# Patient Record
Sex: FEMALE | Race: WHITE | Employment: OTHER | URBAN - METROPOLITAN AREA
[De-identification: names, ages, dates, MRNs, and addresses within clinical notes are randomized per-mention and may not be internally consistent; named-entity substitution may affect disease eponyms.]

---

## 2021-11-17 ENCOUNTER — APPOINTMENT (OUTPATIENT)
Dept: GENERAL RADIOLOGY | Age: 86
DRG: 378 | End: 2021-11-17
Attending: EMERGENCY MEDICINE
Payer: MEDICARE

## 2021-11-17 ENCOUNTER — HOSPITAL ENCOUNTER (INPATIENT)
Age: 86
LOS: 9 days | Discharge: HOME HEALTH | DRG: 378 | End: 2021-11-28
Attending: EMERGENCY MEDICINE | Admitting: INTERNAL MEDICINE
Payer: MEDICARE

## 2021-11-17 DIAGNOSIS — K92.2 LOWER GI BLEED: Primary | ICD-10-CM

## 2021-11-17 LAB
ABO + RH BLD: NORMAL
BLOOD GROUP ANTIBODIES SERPL: NORMAL
COVID-19 RAPID TEST, COVR: NOT DETECTED
HCT VFR BLD AUTO: 27.8 % (ref 35.8–46.3)
HCT VFR BLD AUTO: 28.9 % (ref 35.8–46.3)
HGB BLD-MCNC: 9.3 G/DL (ref 11.7–15.4)
HGB BLD-MCNC: 9.7 G/DL (ref 11.7–15.4)
SOURCE, COVRS: NORMAL
SPECIMEN EXP DATE BLD: NORMAL

## 2021-11-17 PROCEDURE — 86900 BLOOD TYPING SEROLOGIC ABO: CPT

## 2021-11-17 PROCEDURE — 74011000250 HC RX REV CODE- 250: Performed by: INTERNAL MEDICINE

## 2021-11-17 PROCEDURE — 74011250637 HC RX REV CODE- 250/637: Performed by: INTERNAL MEDICINE

## 2021-11-17 PROCEDURE — 96374 THER/PROPH/DIAG INJ IV PUSH: CPT

## 2021-11-17 PROCEDURE — 74011250636 HC RX REV CODE- 250/636: Performed by: INTERNAL MEDICINE

## 2021-11-17 PROCEDURE — G0378 HOSPITAL OBSERVATION PER HR: HCPCS

## 2021-11-17 PROCEDURE — 85018 HEMOGLOBIN: CPT

## 2021-11-17 PROCEDURE — 74018 RADEX ABDOMEN 1 VIEW: CPT

## 2021-11-17 PROCEDURE — 87635 SARS-COV-2 COVID-19 AMP PRB: CPT

## 2021-11-17 PROCEDURE — C9113 INJ PANTOPRAZOLE SODIUM, VIA: HCPCS | Performed by: INTERNAL MEDICINE

## 2021-11-17 PROCEDURE — 99284 EMERGENCY DEPT VISIT MOD MDM: CPT

## 2021-11-17 RX ORDER — ESCITALOPRAM OXALATE 20 MG/1
20 TABLET ORAL DAILY
COMMUNITY

## 2021-11-17 RX ORDER — SODIUM CHLORIDE 9 MG/ML
75 INJECTION, SOLUTION INTRAVENOUS CONTINUOUS
Status: DISCONTINUED | OUTPATIENT
Start: 2021-11-17 | End: 2021-11-19

## 2021-11-17 RX ORDER — AMOXICILLIN 250 MG
CAPSULE ORAL
COMMUNITY

## 2021-11-17 RX ORDER — ACETAMINOPHEN 325 MG/1
650 TABLET ORAL
Status: DISCONTINUED | OUTPATIENT
Start: 2021-11-17 | End: 2021-11-17 | Stop reason: SDUPTHER

## 2021-11-17 RX ORDER — ACETAMINOPHEN 650 MG/1
650 SUPPOSITORY RECTAL
Status: DISCONTINUED | OUTPATIENT
Start: 2021-11-17 | End: 2021-11-26 | Stop reason: HOSPADM

## 2021-11-17 RX ORDER — TRAMADOL HYDROCHLORIDE 50 MG/1
TABLET ORAL
COMMUNITY
Start: 2021-08-10

## 2021-11-17 RX ORDER — SODIUM CHLORIDE 0.9 % (FLUSH) 0.9 %
5-40 SYRINGE (ML) INJECTION EVERY 8 HOURS
Status: DISCONTINUED | OUTPATIENT
Start: 2021-11-17 | End: 2021-11-26 | Stop reason: HOSPADM

## 2021-11-17 RX ORDER — PROMETHAZINE HYDROCHLORIDE 25 MG/1
12.5 TABLET ORAL
Status: DISCONTINUED | OUTPATIENT
Start: 2021-11-17 | End: 2021-11-26 | Stop reason: HOSPADM

## 2021-11-17 RX ORDER — GUAIFENESIN 1200 MG
TABLET, EXTENDED RELEASE 12 HR ORAL AS NEEDED
COMMUNITY

## 2021-11-17 RX ORDER — BISACODYL 5 MG
5 TABLET, DELAYED RELEASE (ENTERIC COATED) ORAL DAILY PRN
Status: DISCONTINUED | OUTPATIENT
Start: 2021-11-17 | End: 2021-11-26 | Stop reason: HOSPADM

## 2021-11-17 RX ORDER — ESCITALOPRAM OXALATE 10 MG/1
20 TABLET ORAL DAILY
Status: DISCONTINUED | OUTPATIENT
Start: 2021-11-18 | End: 2021-11-26 | Stop reason: HOSPADM

## 2021-11-17 RX ORDER — ACETAMINOPHEN 325 MG/1
650 TABLET ORAL
Status: DISCONTINUED | OUTPATIENT
Start: 2021-11-17 | End: 2021-11-26 | Stop reason: HOSPADM

## 2021-11-17 RX ORDER — CHOLECALCIFEROL (VITAMIN D3) 125 MCG
CAPSULE ORAL
COMMUNITY

## 2021-11-17 RX ORDER — SODIUM CHLORIDE 0.9 % (FLUSH) 0.9 %
5-40 SYRINGE (ML) INJECTION AS NEEDED
Status: DISCONTINUED | OUTPATIENT
Start: 2021-11-17 | End: 2021-11-26 | Stop reason: HOSPADM

## 2021-11-17 RX ORDER — TRAMADOL HYDROCHLORIDE 50 MG/1
50 TABLET ORAL
Status: DISCONTINUED | OUTPATIENT
Start: 2021-11-17 | End: 2021-11-26 | Stop reason: HOSPADM

## 2021-11-17 RX ORDER — LEVOTHYROXINE SODIUM 75 UG/1
75 TABLET ORAL
Status: DISCONTINUED | OUTPATIENT
Start: 2021-11-18 | End: 2021-11-26 | Stop reason: HOSPADM

## 2021-11-17 RX ORDER — POLYETHYLENE GLYCOL 3350 17 G/17G
17 POWDER, FOR SOLUTION ORAL DAILY
Status: DISCONTINUED | OUTPATIENT
Start: 2021-11-18 | End: 2021-11-18

## 2021-11-17 RX ORDER — AMLODIPINE BESYLATE 5 MG/1
2.5 TABLET ORAL EVERY 12 HOURS
Status: DISCONTINUED | OUTPATIENT
Start: 2021-11-17 | End: 2021-11-26 | Stop reason: HOSPADM

## 2021-11-17 RX ORDER — HYDROCORTISONE ACETATE 25 MG/1
25 SUPPOSITORY RECTAL EVERY 6 HOURS
COMMUNITY

## 2021-11-17 RX ORDER — POLYETHYLENE GLYCOL 3350 17 G/17G
17 POWDER, FOR SOLUTION ORAL DAILY
COMMUNITY

## 2021-11-17 RX ORDER — SODIUM CHLORIDE TAB 1 GM 1 G
TAB MISCELLANEOUS
COMMUNITY

## 2021-11-17 RX ORDER — OMEPRAZOLE 40 MG/1
CAPSULE, DELAYED RELEASE ORAL
COMMUNITY

## 2021-11-17 RX ORDER — ONDANSETRON 2 MG/ML
4 INJECTION INTRAMUSCULAR; INTRAVENOUS
Status: DISCONTINUED | OUTPATIENT
Start: 2021-11-17 | End: 2021-11-26 | Stop reason: HOSPADM

## 2021-11-17 RX ORDER — PROMETHAZINE HYDROCHLORIDE 12.5 MG/1
12.5 TABLET ORAL
COMMUNITY

## 2021-11-17 RX ORDER — DICLOFENAC SODIUM 10 MG/G
2 GEL TOPICAL 4 TIMES DAILY
Status: DISCONTINUED | OUTPATIENT
Start: 2021-11-17 | End: 2021-11-26 | Stop reason: HOSPADM

## 2021-11-17 RX ORDER — ONDANSETRON 4 MG/1
4 TABLET, ORALLY DISINTEGRATING ORAL
Status: DISCONTINUED | OUTPATIENT
Start: 2021-11-17 | End: 2021-11-26 | Stop reason: HOSPADM

## 2021-11-17 RX ORDER — LEVOTHYROXINE SODIUM 50 UG/1
75 TABLET ORAL
COMMUNITY

## 2021-11-17 RX ORDER — LORAZEPAM 0.5 MG/1
0.25 TABLET ORAL 2 TIMES DAILY
Status: DISCONTINUED | OUTPATIENT
Start: 2021-11-17 | End: 2021-11-26 | Stop reason: HOSPADM

## 2021-11-17 RX ORDER — DICLOFENAC SODIUM 10 MG/G
GEL TOPICAL 4 TIMES DAILY
COMMUNITY

## 2021-11-17 RX ORDER — ACETAMINOPHEN 650 MG/1
650 SUPPOSITORY RECTAL
Status: DISCONTINUED | OUTPATIENT
Start: 2021-11-17 | End: 2021-11-17 | Stop reason: SDUPTHER

## 2021-11-17 RX ORDER — AMLODIPINE BESYLATE 2.5 MG/1
5 TABLET ORAL EVERY 12 HOURS
Status: ON HOLD | COMMUNITY
End: 2021-11-28 | Stop reason: SDUPTHER

## 2021-11-17 RX ORDER — LORAZEPAM 0.5 MG/1
TABLET ORAL
COMMUNITY
Start: 2021-08-10

## 2021-11-17 RX ADMIN — Medication 10 ML: at 22:01

## 2021-11-17 RX ADMIN — DICLOFENAC SODIUM 2 G: 10 GEL TOPICAL at 23:30

## 2021-11-17 RX ADMIN — AMLODIPINE BESYLATE 2.5 MG: 5 TABLET ORAL at 23:24

## 2021-11-17 RX ADMIN — SODIUM CHLORIDE 75 ML/HR: 900 INJECTION, SOLUTION INTRAVENOUS at 21:19

## 2021-11-17 RX ADMIN — SODIUM CHLORIDE 40 MG: 9 INJECTION INTRAMUSCULAR; INTRAVENOUS; SUBCUTANEOUS at 22:01

## 2021-11-17 RX ADMIN — TRAMADOL HYDROCHLORIDE 50 MG: 50 TABLET, COATED ORAL at 23:24

## 2021-11-17 RX ADMIN — LORAZEPAM 0.25 MG: 0.5 TABLET ORAL at 23:24

## 2021-11-17 NOTE — H&P
Hospitalist History and Physical   Admit Date:  2021  2:04 PM   Name:  Cally Guzman   Age:  80 y.o. Sex:  female  :  1926   MRN:  [id number]   Room:  [place] [address]    Presenting Complaint: Constipation    Reason(s) for Admission: Lower GI bleed [K92.2]     History of Present Illness:   Cally Guzman is a 80 y.o. female with medical history of hemorrhoids, coronary artery disease, hip fracture, osteoporosis, chronic hyponatremia, chronic cystitis, hypertension who presents to the emergency department after being evaluated at [facility] emergency [place] for the patient was complaining of constant need for having to have a bowel movement and when she does this she has no evacuation of feces and has bright red blood. Apparently at [facility] emergency department emergency room physician attempted to have the patient passed a bowel movement indeed did not have any fecal episode but did have a large bright red blood per rectum episode. Subsequently the patient was sent to our emergency department for further evaluation and treatment. At the time of my evaluation of the patient patient has become significantly agitated while she is oriented to person place and time as well as being able to tell me that she has been constipated for a week but has only been able to pass bright red blood she cannot comprehend why she has to be admitted to the hospital and she further is tearful and a poor historian and she is very agitated. Much of the information obtained in my history and physical was obtained from prior medical records and outside medical records. Course of action in the emergency department-KUB was obtained that demonstrated no acute process. H&H was obtained that demonstrated hemoglobin 9.6 hematocrit of 28.9.. BMP demonstrated a serum sodium of 134 potassium 3.8 chloride of 96 CO2 of 28 anion gap of 10 BUN of 17 creatinine of 0.7.   Outside medical records were reviewed and patient has a baseline hemoglobin approximately 11. Secondary to 3 g drop in patient's hemoglobin hematocrit the decision was made to admit the patient for further evaluation and treatment. Review of Systems:  10 systems reviewed and negative except as noted in HPI. Assessment & Plan:     Lower GI bleed-likely hemorrhoidal in nature given bright red blood per rectum and patient's constant straining to have a bowel movement. Will make the patient n.p.o. initiate IV fluids start Protonix and monitor patient's hemoglobin hematocrit. Will consult GI in a.m. Chronic hyponatremia-noted and patient appears to be at baseline      Hypertension-we will start patient on home meds. Currently to goal      Anxiety/depression-we will resume patient's home meds        Coronary artery disease-no acute process at this time we will resume patient's home meds. Will hold aspirin and/or Plavix.         Dispo/Discharge Planning:   Home with home health    Diet: DIET NPO  VTE ppx: SCD  Code status: Full Code    Hospital Problems as of 11/17/2021 Never Reviewed          Codes Class Noted - Resolved POA    Lower GI bleed ICD-10-CM: K92.2  ICD-9-CM: 578.9  11/17/2021 - Present Unknown                Past Medical History:    Diagnosis Date    Anxiety    CAD (coronary artery disease)   [facility] (8/2010) mild CAD, EF 60%, nonobstructive    Cancer ([facility] [address]   History of basal cell carcinoma    Depression    H/O bone density study   BMD (7/2009) T score lumbar spine -0.4    H/O echocardiogram 2/2015   EF 65%, dilated LA, trace TR    History of Holter monitoring   Holter monitor (3/2015) NSR, no pauses of AV block, normal intervals, isolated nonsustained episodes of SVT    Hypertension    Hyponatremia    Hypothyroidism    Interstitial cystitis    Lipid screening   FLP (9/2012) total 178, TG 92, HDL 67, LDL 86    Osteoporosis    Stasis dermatitis       Allergies   Allergen Reactions    Aleve [Naproxen Sodium] Other- (not listed) - Allergy dizziness    Ciprofloxacin Nausea and/or vomiting-Intolerance   GI upset    Sulfa (Sulfonamide Antibiotics) Nausea and/or vomiting-Intolerance   GI upset       Past Surgical History:   Procedure Laterality Date    HEMORRHOID SURGERY 1955    HIP SURGERY Left 01/14/2019   Left hip hemiarthroplasty    HYSTERECTOMY   1955    INTERTROCHANTERIC HIP FRACTURE SURGERY Right 2/5/2017   CM nail, [facility] MOHS SURGERY 2010       Family History   Problem Relation Age of Onset    Leukemia Mother    Anxiety disorder Sister    Diabetes Sister    Bipolar disorder Daughter       Social History     Tobacco Use    Smoking status: Not on file    Smokeless tobacco: Not on file   Substance Use Topics    Alcohol use: Not on file      No family history on file. Family history reviewed and negative except as otherwise noted. There is no immunization history on file for this patient. Prior to Admit Medications:  No current outpatient medications    Objective:     Patient Vitals for the past 24 hrs:   Temp Pulse Resp BP SpO2   11/17/21 1411 98.4 °F (36.9 °C) 66 20 114/66 98 %     Oxygen Therapy  O2 Sat (%): 98 % (11/17/21 1411)    There is no height or weight on file to calculate BMI. No intake or output data in the 24 hours ending 11/17/21 1809      Physical Exam:    Blood pressure 114/66, pulse 66, temperature 98.4 °F (36.9 °C), resp. rate 20, SpO2 98 %. General:    Well nourished. No overt distress  Head:  Normocephalic, atraumatic  Eyes:  Sclerae appear normal.  Pupils equally round. ENT:  Nares appear normal, no drainage. Moist oral mucosa  Neck:  No restricted ROM. Trachea midline   CV:   RRR. No m/r/g. No jugular venous distension. Lungs:   CTAB. No wheezing, rhonchi, or rales. Respirations even, unlabored  Abdomen: Bowel sounds present. Soft, nontender, nondistended. Extremities: No cyanosis or clubbing. No edema  Skin:     No rashes and normal coloration. Warm and dry.     Neuro:  CN II-XII grossly intact. Sensation intact. A&Ox3 clearly agitated and confused as to why she has to be admitted to the hospital.  Psych:  Normal mood and affect. I have reviewed ordered lab tests and independently visualized imaging below:    Last 24hr Labs:  Recent Results (from the past 24 hour(s))   TYPE & SCREEN    Collection Time: 11/17/21  2:59 PM   Result Value Ref Range    Crossmatch Expiration 11/20/2021,2359     ABO/Rh(D) A POSITIVE     Antibody screen NEG    HGB & HCT    Collection Time: 11/17/21  2:59 PM   Result Value Ref Range    HGB 9.7 (L) 11.7 - 15.4 g/dL    HCT 28.9 (L) 35.8 - 46.3 %       All Micro Results     None          Other Studies:  XR ABD (KUB)    Result Date: 11/17/2021  KUB CLINICAL INDICATION: Constipation for one week with blood in stools FINDINGS: Two supine views of the abdomen and pelvis submitted. Air and stool noted throughout the colon which is nondilated. There is stool over the rectum. No dilated loops of bowel present to indicate obstruction. Degenerative spondylosis is noted of the lumbar spine. No abnormal calculi present. The patient has bilateral hip hardware. No acute abnormality. Constipation could be considered. Signed:  Elsie Kim DO    Part of this note may have been written by using a voice dictation software. The note has been proof read but may still contain some grammatical/other typographical errors.

## 2021-11-17 NOTE — ED PROVIDER NOTES
Patient was referred to the emergency room for further evaluation for possible GI bleeding. The patient was seen in local freestanding emergency care center with complaints of constipation and rectal bleeding. The patient states she is been constipated for over a month and has a constant urge of needing to defecate. She states that when she goes to the bathroom only blood comes out. Report from the provider that saw the patient at the urgent care center states that she went to the bathroom and was found to have a large amount of bright red blood in the toilet but no stool after going. It was unclear whether the bleeding was rectal or vaginal as there was no significant blood noted on rectal exam however stool was Hemoccult positive. Patient is status post hysterectomy. She denies any abdominal pain or lightheadedness and review of systems otherwise negative. The history is provided by the patient and medical records. Constipation   This is a chronic problem. The current episode started more than 1 week ago. The stool is described as bloody coated. Associated symptoms include constipation. Pertinent negatives include no abdominal pain, no flatus, no dysuria, no abdominal distention, no chills, no fever, no nausea, no back pain, no vomiting and no diarrhea. Risk factors include a recent illness. She has tried nothing for the symptoms. The treatment provided no relief. Her past medical history does not include dementia, neuromuscular disease, irritable bowel syndrome, neurologic disease, small bowel obstruction, abdominal surgery, nursing home resident, endocrine disease or metabolic disease. No past medical history on file. No past surgical history on file. No family history on file.     Social History     Socioeconomic History    Marital status:      Spouse name: Not on file    Number of children: Not on file    Years of education: Not on file    Highest education level: Not on file   Occupational History    Not on file   Tobacco Use    Smoking status: Not on file    Smokeless tobacco: Not on file   Substance and Sexual Activity    Alcohol use: Not on file    Drug use: Not on file    Sexual activity: Not on file   Other Topics Concern    Not on file   Social History Narrative    Not on file     Social Determinants of Health     Financial Resource Strain:     Difficulty of Paying Living Expenses: Not on file   Food Insecurity:     Worried About Running Out of Food in the Last Year: Not on file    Norah of Food in the Last Year: Not on file   Transportation Needs:     Lack of Transportation (Medical): Not on file    Lack of Transportation (Non-Medical): Not on file   Physical Activity:     Days of Exercise per Week: Not on file    Minutes of Exercise per Session: Not on file   Stress:     Feeling of Stress : Not on file   Social Connections:     Frequency of Communication with Friends and Family: Not on file    Frequency of Social Gatherings with Friends and Family: Not on file    Attends [employer]ism Services: Not on file    Active Member of [address] [place] [address] or Organizations: Not on file    Attends Club or Organization Meetings: Not on file    Marital Status: Not on file   Intimate Partner Violence:     Fear of Current or Ex-Partner: Not on file    Emotionally Abused: Not on file    Physically Abused: Not on file    Sexually Abused: Not on file   Housing Stability:     Unable to Pay for Housing in the Last Year: Not on file    Number of [place] in the Last Year: Not on file    Unstable Housing in the Last Year: Not on file         ALLERGIES: Patient has no allergy information on record. Review of Systems   Constitutional: Negative for chills and fever. Gastrointestinal: Positive for constipation. Negative for abdominal distention, abdominal pain, diarrhea, flatus, nausea and vomiting. Genitourinary: Negative for dysuria.    Musculoskeletal: Negative for back pain.   All other systems reviewed and are negative. Vitals:    11/17/21 1411   BP: 114/66   Pulse: 66   Resp: 20   Temp: 98.4 °F (36.9 °C)   SpO2: 98%            Physical Exam  Vitals and nursing note reviewed. Constitutional:       General: She is not in acute distress. Appearance: She is not ill-appearing, toxic-appearing or diaphoretic. Comments: Frail-appearing 27-year-old woman   HENT:      Head: Normocephalic and atraumatic. Nose: Nose normal.      Mouth/Throat:      Mouth: Mucous membranes are moist.      Pharynx: Oropharynx is clear. Eyes:      Extraocular Movements: Extraocular movements intact. Conjunctiva/sclera: Conjunctivae normal.      Pupils: Pupils are equal, round, and reactive to light. Cardiovascular:      Rate and Rhythm: Normal rate and regular rhythm. Pulses: Normal pulses. Pulmonary:      Effort: Pulmonary effort is normal.      Breath sounds: Normal breath sounds. Abdominal:      General: Bowel sounds are normal. There is no distension. Palpations: Abdomen is soft. Tenderness: There is no abdominal tenderness. There is no right CVA tenderness, left CVA tenderness or guarding. Genitourinary:     Comments: Pressure bright red blood is noted to be in depends as well as a pad and covering the perineum. Pediatric speculum was used for vaginal exam no evidence of bleeding from within the vaginal vault. Rectal exam demonstrates no anal fissures there is brown soft stool in the rectal vault. Small amount of blood noted on the glove. No palpable hemorrhoids or masses were noted. Musculoskeletal:         General: Normal range of motion. Cervical back: Normal range of motion and neck supple. Skin:     General: Skin is warm and dry. Capillary Refill: Capillary refill takes less than 2 seconds. Neurological:      General: No focal deficit present. Mental Status: She is alert and oriented to person, place, and time.  Mental status is at baseline. Psychiatric:         Mood and Affect: Mood normal.         Behavior: Behavior normal.         Thought Content: Thought content normal.          MDM  Number of Diagnoses or Management Options  Diagnosis management comments: History is results from emergency MD: WBC = 4.1; Hbg = 9.8;  Hct = 27; Plts = 300; no significant abnormalities in the chemistries; BUN=18, Cr = 0.9       Amount and/or Complexity of Data Reviewed  Clinical lab tests: ordered and reviewed  Review and summarize past medical records: yes  Independent visualization of images, tracings, or specimens: yes    Risk of Complications, Morbidity, and/or Mortality  Presenting problems: moderate  Diagnostic procedures: moderate  Management options: moderate    Patient Progress  Patient progress: stable         Procedures

## 2021-11-17 NOTE — ED TRIAGE NOTES
Pt here from emergency MD by EMS, pt comes to ED with complaint of constipation X 1 week. EMS states the provider at emergency [facility] states that the pt had a positive hemoccult and a large amount of blood in the toilet after using the restroom.

## 2021-11-18 PROBLEM — E03.9 ACQUIRED HYPOTHYROIDISM: Status: ACTIVE | Noted: 2021-11-18

## 2021-11-18 PROBLEM — D62 ACUTE BLOOD LOSS ANEMIA: Status: ACTIVE | Noted: 2021-11-18

## 2021-11-18 PROBLEM — I10 HYPERTENSION: Status: ACTIVE | Noted: 2021-11-18

## 2021-11-18 PROBLEM — F32.9 MDD (MAJOR DEPRESSIVE DISORDER): Status: ACTIVE | Noted: 2021-11-18

## 2021-11-18 PROBLEM — F41.9 ANXIETY: Status: ACTIVE | Noted: 2021-11-18

## 2021-11-18 LAB
ANION GAP SERPL CALC-SCNC: 5 MMOL/L (ref 7–16)
BASOPHILS # BLD: 0 K/UL (ref 0–0.2)
BASOPHILS NFR BLD: 1 % (ref 0–2)
BUN SERPL-MCNC: 18 MG/DL (ref 8–23)
CALCIUM SERPL-MCNC: 8.8 MG/DL (ref 8.3–10.4)
CHLORIDE SERPL-SCNC: 101 MMOL/L (ref 98–107)
CO2 SERPL-SCNC: 26 MMOL/L (ref 21–32)
CREAT SERPL-MCNC: 0.66 MG/DL (ref 0.6–1)
DIFFERENTIAL METHOD BLD: ABNORMAL
EOSINOPHIL # BLD: 0.1 K/UL (ref 0–0.8)
EOSINOPHIL NFR BLD: 1 % (ref 0.5–7.8)
ERYTHROCYTE [DISTWIDTH] IN BLOOD BY AUTOMATED COUNT: 14.9 % (ref 11.9–14.6)
GLUCOSE SERPL-MCNC: 81 MG/DL (ref 65–100)
HCT VFR BLD AUTO: 26.2 % (ref 35.8–46.3)
HCT VFR BLD AUTO: 27 % (ref 35.8–46.3)
HGB BLD-MCNC: 8.8 G/DL (ref 11.7–15.4)
HGB BLD-MCNC: 8.9 G/DL (ref 11.7–15.4)
IMM GRANULOCYTES # BLD AUTO: 0 K/UL (ref 0–0.5)
IMM GRANULOCYTES NFR BLD AUTO: 1 % (ref 0–5)
LYMPHOCYTES # BLD: 1.1 K/UL (ref 0.5–4.6)
LYMPHOCYTES NFR BLD: 26 % (ref 13–44)
MCH RBC QN AUTO: 32.4 PG (ref 26.1–32.9)
MCHC RBC AUTO-ENTMCNC: 33 G/DL (ref 31.4–35)
MCV RBC AUTO: 98.2 FL (ref 79.6–97.8)
MONOCYTES # BLD: 0.5 K/UL (ref 0.1–1.3)
MONOCYTES NFR BLD: 12 % (ref 4–12)
NEUTS SEG # BLD: 2.6 K/UL (ref 1.7–8.2)
NEUTS SEG NFR BLD: 59 % (ref 43–78)
NRBC # BLD: 0 K/UL (ref 0–0.2)
PLATELET # BLD AUTO: 271 K/UL (ref 150–450)
PMV BLD AUTO: 8.9 FL (ref 9.4–12.3)
POTASSIUM SERPL-SCNC: 4 MMOL/L (ref 3.5–5.1)
RBC # BLD AUTO: 2.75 M/UL (ref 4.05–5.2)
SODIUM SERPL-SCNC: 132 MMOL/L (ref 136–145)
WBC # BLD AUTO: 4.3 K/UL (ref 4.3–11.1)

## 2021-11-18 PROCEDURE — 85018 HEMOGLOBIN: CPT

## 2021-11-18 PROCEDURE — C9113 INJ PANTOPRAZOLE SODIUM, VIA: HCPCS | Performed by: INTERNAL MEDICINE

## 2021-11-18 PROCEDURE — G0378 HOSPITAL OBSERVATION PER HR: HCPCS

## 2021-11-18 PROCEDURE — 80048 BASIC METABOLIC PNL TOTAL CA: CPT

## 2021-11-18 PROCEDURE — 74011250636 HC RX REV CODE- 250/636: Performed by: INTERNAL MEDICINE

## 2021-11-18 PROCEDURE — 74011250637 HC RX REV CODE- 250/637: Performed by: INTERNAL MEDICINE

## 2021-11-18 PROCEDURE — 85025 COMPLETE CBC W/AUTO DIFF WBC: CPT

## 2021-11-18 PROCEDURE — 36415 COLL VENOUS BLD VENIPUNCTURE: CPT

## 2021-11-18 PROCEDURE — 74011000250 HC RX REV CODE- 250: Performed by: INTERNAL MEDICINE

## 2021-11-18 PROCEDURE — 96376 TX/PRO/DX INJ SAME DRUG ADON: CPT

## 2021-11-18 RX ORDER — HYDROCORTISONE ACETATE 25 MG/1
25 SUPPOSITORY RECTAL EVERY 12 HOURS
Status: DISCONTINUED | OUTPATIENT
Start: 2021-11-18 | End: 2021-11-26 | Stop reason: HOSPADM

## 2021-11-18 RX ORDER — FACIAL-BODY WIPES
10 EACH TOPICAL DAILY PRN
Status: DISCONTINUED | OUTPATIENT
Start: 2021-11-18 | End: 2021-11-26 | Stop reason: HOSPADM

## 2021-11-18 RX ORDER — POLYETHYLENE GLYCOL 3350 17 G/17G
17 POWDER, FOR SOLUTION ORAL 2 TIMES DAILY
Status: DISCONTINUED | OUTPATIENT
Start: 2021-11-19 | End: 2021-11-26 | Stop reason: HOSPADM

## 2021-11-18 RX ADMIN — Medication 10 ML: at 13:53

## 2021-11-18 RX ADMIN — DICLOFENAC SODIUM 2 G: 10 GEL TOPICAL at 13:52

## 2021-11-18 RX ADMIN — TRAMADOL HYDROCHLORIDE 50 MG: 50 TABLET, COATED ORAL at 08:14

## 2021-11-18 RX ADMIN — Medication 10 ML: at 22:45

## 2021-11-18 RX ADMIN — HYDROCORTISONE ACETATE 25 MG: 25 SUPPOSITORY RECTAL at 22:30

## 2021-11-18 RX ADMIN — LORAZEPAM 0.25 MG: 0.5 TABLET ORAL at 17:30

## 2021-11-18 RX ADMIN — SODIUM CHLORIDE 40 MG: 9 INJECTION INTRAMUSCULAR; INTRAVENOUS; SUBCUTANEOUS at 22:22

## 2021-11-18 RX ADMIN — Medication 1 EACH: at 16:09

## 2021-11-18 RX ADMIN — TRAMADOL HYDROCHLORIDE 50 MG: 50 TABLET, COATED ORAL at 19:51

## 2021-11-18 RX ADMIN — DICLOFENAC SODIUM 2 G: 10 GEL TOPICAL at 09:41

## 2021-11-18 RX ADMIN — Medication 10 ML: at 05:00

## 2021-11-18 RX ADMIN — LEVOTHYROXINE SODIUM 75 MCG: 75 TABLET ORAL at 05:40

## 2021-11-18 RX ADMIN — ESCITALOPRAM OXALATE 20 MG: 10 TABLET ORAL at 09:33

## 2021-11-18 RX ADMIN — BISACODYL 10 MG: 10 SUPPOSITORY RECTAL at 09:40

## 2021-11-18 RX ADMIN — SODIUM CHLORIDE 75 ML/HR: 900 INJECTION, SOLUTION INTRAVENOUS at 22:29

## 2021-11-18 RX ADMIN — DICLOFENAC SODIUM 2 G: 10 GEL TOPICAL at 22:36

## 2021-11-18 RX ADMIN — SODIUM CHLORIDE 75 ML/HR: 900 INJECTION, SOLUTION INTRAVENOUS at 11:12

## 2021-11-18 RX ADMIN — AMLODIPINE BESYLATE 2.5 MG: 5 TABLET ORAL at 22:20

## 2021-11-18 RX ADMIN — SODIUM CHLORIDE 40 MG: 9 INJECTION INTRAMUSCULAR; INTRAVENOUS; SUBCUTANEOUS at 09:35

## 2021-11-18 RX ADMIN — LORAZEPAM 0.25 MG: 0.5 TABLET ORAL at 09:33

## 2021-11-18 RX ADMIN — DICLOFENAC SODIUM 2 G: 10 GEL TOPICAL at 17:36

## 2021-11-18 RX ADMIN — AMLODIPINE BESYLATE 2.5 MG: 5 TABLET ORAL at 09:34

## 2021-11-18 NOTE — PROGRESS NOTES
Care Management Interventions  PCP Verified by CM: No  Mode of Transport at Discharge: BLS  Transition of Care Consult (CM Consult): [address]: No  Reason Outside [place]: Patient already serviced by other home care/hospice agency  Physical Therapy Consult: Yes  Occupational Therapy Consult: Yes  Support Systems: Other Family Member(s)  Confirm Follow Up Transport: Family  The Patient and/or Patient Representative was Provided with a Choice of Provider and Agrees with the Discharge Plan?: Yes  Freedom of Choice List was Provided with Basic Dialogue that Supports the Patient's Individualized Plan of Care/Goals, Treatment Preferences and Shares the Quality Data Associated with the Providers?: Yes  Discharge Location  Discharge Placement: Home with home health     Patient in room alone. Lying in bed. States she lives with her grandson and six great-grandchildren. Has a home, she says, but has had to move in with grandson to have more help with her care. She wants to go home so bad to her own home. Advised that [facility] can resume her care at her grandson's home. She didn't respond to this. Have talked with Alan Phipps from [facility] on floor. States when she is ready for DC and medically stable,  they can do a resumption of care for her.

## 2021-11-18 NOTE — PROGRESS NOTES
Problem: Pressure Injury - Risk of  Goal: *Prevention of pressure injury  Description: Document Ralph Scale and appropriate interventions in the flowsheet.   Outcome: Progressing Towards Goal  Note: Pressure Injury Interventions:  Sensory Interventions: Assess changes in LOC, Minimize linen layers    Moisture Interventions: Absorbent underpads, Maintain skin hydration (lotion/cream), Minimize layers    Activity Interventions: Pressure redistribution bed/mattress(bed type)    Mobility Interventions: Pressure redistribution bed/mattress (bed type)    Nutrition Interventions: Discuss nutritional consult with provider    Friction and Shear Interventions: Foam dressings/transparent film/skin sealants                Problem: Patient Education: Go to Patient Education Activity  Goal: Patient/Family Education  Outcome: Progressing Towards Goal

## 2021-11-18 NOTE — CONSULTS
[facility] Consult Note       Primary GI Physician: new    Referring Provider:  Dr Yony Jesus Date:  11/18/2021    Admit Date:  11/17/2021    Chief Complaint:  Rectal bleeding, constipation      Subjective:     History of Present Illness:  Patient is a 80 y.o. female with PMH CAD, osteoporosis, HTN, chronic hyponatremia, chronic anxiety, hemorrhoids, seen in consultation at the request of Dr. Meg Fregoso for evaluation of constipation and rectal bleeding. She was initially seen at urgent care center yesterday for the same, sent to the ER for further evaluation after noted to have large episode of rectal bleeding. Hgb on arrival here was 9.7, down to 8.9 this morning. Review of records in care everywhere noted hgb 11.7 in August, with hgb 10.5 on 11/12, and down to 9.8 on 11/15. She has been on Pericolace and Miralax as an outpatient (per internist records) for treatment of constipation. PMH:  - CAD, Depresssion, HTN, Hypothyroidism, Interstitial Cystitis, Osteoporosis    PSH:  - Hemorrhoid surgery in 1955, L Hip Surgery in 2019, Hysterectomy in 1955, Hip Fracture in 2017, MOHS in 2010    Allergies:  - Aleve, Cipro and Sulfa    Home Medications:  Prior to Admission medications    Medication Sig Start Date End Date Taking? Authorizing Provider   amLODIPine (NORVASC) 2.5 mg tablet 5 mg every twelve (12) hours. Yes Other, MD Kelle   levothyroxine (SYNTHROID) 50 mcg tablet 75 mcg. Yes Other, MD Kelle   LORazepam (ATIVAN) 0.5 mg tablet lorazepam 0.5 mg tablet   TAKE 1/2 TABLET EVERY MOTNING AND 1/2 TABLET AT BEDTIME 8/10/21  Yes Other, MD Kelle   omeprazole (PRILOSEC) 40 mg capsule omeprazole 40 mg capsule,delayed release   TAKE 1 CAPSULE EACH DAY FOR STOMACH ACID REDUCTION.    Yes Other, MD Kelle   senna-docusate (PERICOLACE) 8.6-50 mg per tablet Senna Plus 8.6 mg-50 mg tablet   TAKE 1-2 TABLETS EVERY DAY AS NEEDED FOR CONSTIPATION   Yes Other, MD Kelle   sodium chloride 1 gram tablet sodium chloride 1 gram tablet   TAKE 1 TABLET TWICE A DAY FOR SODIUM LEVELS. Yes Claudio, MD Kelle   traMADoL (ULTRAM) 50 mg tablet tramadol 50 mg tablet   Take 1 tablet every 6 hours by oral route as needed for 30 days. 8/10/21  Yes Kelle Snyder MD   hydrocortisone (Anusol-HC) 25 mg supp Insert 25 mg into rectum every six (6) hours. Yes Claudio, MD Kelle   cholecalciferol, vitamin D3, (Vitamin D3) 50 mcg (2,000 unit) tab Take  by mouth. Yes Claudio, MD Kelle   escitalopram oxalate (Lexapro) 20 mg tablet Take 20 mg by mouth daily. Yes Claudio, MD Kelle   polyethylene glycol (Miralax) 17 gram/dose powder Take 17 g by mouth daily. Yes Claudio, MD Kelle   zwspugshg-Okuunkqk-Fxozo-W.Pet (Preparation H Maximum Strength) 0.25-1 % rectal cream Apply  to affected area as needed for Hemorrhoids. Yes Claudio, MD Kelle   diclofenac (Voltaren) 1 % gel Apply  to affected area four (4) times daily. Yes Claudio, MD Kelle   promethazine (PHENERGAN) 12.5 mg tablet Take 12.5 mg by mouth nightly as needed for Nausea. Other, MD Kelle   acetaminophen (TylenoL) 325 mg cap Take  by mouth as needed.     Other, MD Kelle       Hospital Medications:  Current Facility-Administered Medications   Medication Dose Route Frequency    bisacodyL (DULCOLAX) suppository 10 mg  10 mg Rectal DAILY PRN    sodium chloride (NS) flush 5-40 mL  5-40 mL IntraVENous Q8H    sodium chloride (NS) flush 5-40 mL  5-40 mL IntraVENous PRN    ondansetron (ZOFRAN ODT) tablet 4 mg  4 mg Oral Q8H PRN    Or    ondansetron (ZOFRAN) injection 4 mg  4 mg IntraVENous Q6H PRN    polyethylene glycol (MIRALAX) packet 17 g  17 g Oral DAILY    bisacodyL (DULCOLAX) tablet 5 mg  5 mg Oral DAILY PRN    pantoprazole (PROTONIX) 40 mg in 0.9% sodium chloride 10 mL injection  40 mg IntraVENous Q12H    0.9% sodium chloride infusion  75 mL/hr IntraVENous CONTINUOUS    acetaminophen (TYLENOL) tablet 650 mg  650 mg Oral Q6H PRN    Or    acetaminophen (TYLENOL) suppository 650 mg 650 mg Rectal Q6H PRN    amLODIPine (NORVASC) tablet 2.5 mg  2.5 mg Oral Q12H    escitalopram oxalate (LEXAPRO) tablet 20 mg  20 mg Oral DAILY    diclofenac (VOLTAREN) 1 % topical gel 2 g  2 g Topical QID    levothyroxine (SYNTHROID) tablet 75 mcg  75 mcg Oral ACB    LORazepam (ATIVAN) tablet 0.25 mg  0.25 mg Oral BID    promethazine (PHENERGAN) tablet 12.5 mg  12.5 mg Oral QHS PRN    traMADoL (ULTRAM) tablet 50 mg  50 mg Oral Q6H PRN       Social History:  Social History     Tobacco Use    Smoking status: Not on file    Smokeless tobacco: Not on file   Substance Use Topics    Alcohol use: Not on file       Pt denies any history of drug use, blood transfusions, or tattoos. Family History:  - FMHx of Leukemia in Mother, No FMHx of colon cancer. Review of Systems:  A detailed 10 system ROS is obtained, with pertinent positives as listed above. All others are negative. Objective:     Physical Exam:  Vitals:  Visit Vitals  /70   Pulse 68   Temp (!) 96.7 °F (35.9 °C)   Resp 10   SpO2 98%     Gen:  Pt is alert, cooperative, no acute distress. Sitting up in bed. Elderly. Skin:  Extremities and face reveal no rashes. HEENT: Sclerae anicteric. Extra-occular muscles are intact. No oral ulcers. No abnormal pigmentation of the lips. The neck is supple. Cardiovascular: Regular rate and rhythm. No murmurs, gallops, or rubs. Respiratory:  Comfortable breathing with no accessory muscle use. Clear breath sounds anteriorly with no wheezes, rales, or rhonchi. GI:  Abdomen nondistended, soft, and nontender. Normal active bowel sounds. No enlargement of the liver or spleen. No masses palpable. Rectal:  Deferred by patient  Musculoskeletal:  No pitting edema of the lower legs. Arthritis changes noted in hands/ fingers. Neurological:  Gross memory appears intact. Patient is alert and oriented. Psychiatric:  Mood appears appropriate with judgement intact.   Lymphatic:  No cervical or supraclavicular adenopathy. Laboratory:    Recent Labs     11/18/21  0501 11/17/21 2052 11/17/21  1459   WBC 4.3  --   --    HGB 8.9* 9.3* 9.7*   HCT 27.0* 27.8* 28.9*     --   --    MCV 98.2*  --   --    *  --   --    K 4.0  --   --      --   --    CO2 26  --   --    BUN 18  --   --    CREA 0.66  --   --    CA 8.8  --   --    GLU 81  --   --           Assessment:     Principal Problem:    Acute blood loss anemia (11/18/2021)    Active Problems:    Lower GI bleed (11/17/2021)      MDD (major depressive disorder) (11/18/2021)      Anxiety (11/18/2021)      Hypertension (11/18/2021)      Acquired hypothyroidism (11/18/2021)        Plan:     81 yo female admitted by the hospitalist service for rectal bleeding with drop in hgb. She has had a 1 gm drop in hgb overnight and admits to chronic constipation at home. Etiology of bleeding likely hemorrhoidal vs. Fissure vs. Less likely malignancy. [place] with clear liquids  2. Follow hgb  3. Need aggressive treatment for constipation with daily stool softeners and bid Miralax but can be titrated up to four doses daily to make sure patient is having a soft and formed BM daily. 4. Will trial Anusol suppositories  5. I did discuss option of colonoscopy vs. flexible sigmoidoscopy with the patient and her grandon over the phone. Defer endoscopy at this time as patient at high risk for complications given advanced age. Grandson did not think patient would be able to complete a bowel prep and did not want to pursue aggressive measures right away. Patient was also agreeable.     Janie Claire MD   [facility]

## 2021-11-18 NOTE — PROGRESS NOTES
END OF SHIFT NOTE:    Intake/Output  No intake/output data recorded. Voiding: YES  Catheter: NO      Stool:  0 occurrences. Emesis:  0 occurrences. VITAL SIGNS  Patient Vitals for the past 12 hrs:   Temp Pulse Resp BP SpO2   11/17/21 2249 98 °F (36.7 °C) 70 25 120/63 94 %   11/17/21 2044 98 °F (36.7 °C) 65 27 110/64 97 %   11/17/21 2000   16 127/68 95 %   11/17/21 1931    125/69        Pain Assessment  Pain 1  Pain Scale 1: Numeric (0 - 10) (11/17/21 2158)  Pain Intensity 1: 0 (11/17/21 2158)  Patient Stated Pain Goal: 0 (11/17/21 2158)    Ambulating  No    Additional Information:   - pt resting, no needs voiced at this time    Shift report given to oncoming nurse at the bedside.     Kendall Castellano RN

## 2021-11-18 NOTE — ED NOTES
TRANSFER - OUT REPORT:    Verbal report given to [facility]. RN on [facility]  being transferred to room 356 for routine progression of care       Report consisted of patients Situation, Background, Assessment and   Recommendations(SBAR). Information from the following report(s) SBAR was reviewed with the receiving nurse. Lines:       Opportunity for questions and clarification was provided.       Patient transported with:   Trist

## 2021-11-18 NOTE — PROGRESS NOTES
Hospitalist Progress Note   Admit Date:  2021  2:04 PM   Name:  Kandice Ortega   Age:  80 y.o. Sex:  female  :  1926   MRN:  [id number]   Room:  [facility]/    Presenting Complaint: Constipation    Reason(s) for Admission: Lower GI bleed [facility] Course & Interval History:   Mrs. Anny Elizondo is a very nice 81 y/o WF with a h/o CAD, HTN, chronic hyponatremia who was admitted to our service on  with acute blood loss anemia and hematochezia. Hb 1 week prior to admission was in the 10s; 3 months ago was high 11. On presentation it was 9.7. She describes nothing but blood during each BM. She was admitted and started on IVFs, PPI, serial H/H with GI consult. Subjective (21): Feeling better, but says she has terrible trouble with constipation. Assessment & Plan:   # Acute blood loss anemia 2/2 lower GI bleed   - BRBPR, Hb 1 week ago almost 11, now high 8s. Likely hemorrhoid vs diverticulosis (however this was not noted on CT a/p from 21). IVFs, ok to have clears now, GI to see. Hb stable on repeat today so check again tomorrow AM unless more bleeding. # HTN   - Norvasc    # Hypothyroidism   - Synthroid    # MDD/anxiety   - Lexapro, low dose Ativan    Dispo/Discharge Planning: Pending. Diet:  ADULT DIET Clear Liquid;  No Red Dye  DVT PPx: SCDs  Code status: Full Code    Hospital Problems as of 2021 Date Reviewed: 2021          Codes Class Noted - Resolved POA    * (Principal) Acute blood loss anemia ICD-10-CM: D62  ICD-9-CM: 285.1  2021 - Present Yes        MDD (major depressive disorder) ICD-10-CM: F32.9  ICD-9-CM: 296.20  2021 - Present Yes        Anxiety ICD-10-CM: F41.9  ICD-9-CM: 300.00  2021 - Present Yes        Hypertension ICD-10-CM: I10  ICD-9-CM: 401.9  2021 - Present Yes        Acquired hypothyroidism ICD-10-CM: E03.9  ICD-9-CM: 244.9  2021 - Present Yes        Lower GI bleed ICD-10-CM: K92.2  ICD-9-CM: 578.9  2021 - Present Yes              Objective:     Patient Vitals for the past 24 hrs:   Temp Pulse Resp BP SpO2   11/18/21 1132 96.8 °F (36 °C) 86 10 127/73 99 %   11/18/21 0738 (!) 96.7 °F (35.9 °C) 68 10 120/70 98 %   11/17/21 2249 98 °F (36.7 °C) 70 25 120/63 94 %   11/17/21 2044 98 °F (36.7 °C) 65 27 110/64 97 %   11/17/21 2000   16 127/68 95 %   11/17/21 1931    125/69    11/17/21 1755     94 %   11/17/21 1740    (!) 140/67    11/17/21 1411 98.4 °F (36.9 °C) 66 20 114/66 98 %     Oxygen Therapy  O2 Sat (%): 99 % (11/18/21 1132)  Pulse via Oximetry: 70 beats per minute (11/17/21 1755)    There is no height or weight on file to calculate BMI. Intake/Output Summary (Last 24 hours) at 11/18/2021 1320  Last data filed at 11/18/2021 1112  Gross per 24 hour   Intake 992 ml   Output    Net 992 ml         Physical Exam:   Blood pressure 127/73, pulse 86, temperature 96.8 °F (36 °C), resp. rate 10, SpO2 99 %. General:    Well nourished. No overt distress. Appears stated age. Head:  Normocephalic, atraumatic  Eyes:  Sclerae appear normal.  Pupils equally round. ENT:  Nares appear normal, no drainage. Moist oral mucosa  Neck:  No restricted ROM. Trachea midline   CV:   RRR. No m/r/g. No jugular venous distension. Lungs:   CTAB. No wheezing, rhonchi, or rales. Respirations even, unlabored  Abdomen: Bowel sounds present. Soft, nontender, nondistended. Extremities: No cyanosis or clubbing. No edema  Skin:     No rashes and normal coloration. Warm and dry. Neuro:  CN II-XII grossly intact. Sensation intact. A&Ox3  Psych:  Normal mood and affect.       I have reviewed ordered lab tests and independently visualized imaging below:    Recent Labs:  Recent Results (from the past 48 hour(s))   TYPE & SCREEN    Collection Time: 11/17/21  2:59 PM   Result Value Ref Range    Crossmatch Expiration 11/20/2021,2359     ABO/Rh(D) A POSITIVE     Antibody screen NEG    HGB & HCT    Collection Time: 11/17/21 2:59 PM   Result Value Ref Range    HGB 9.7 (L) 11.7 - 15.4 g/dL    HCT 28.9 (L) 35.8 - 46.3 %   COVID-19 RAPID TEST    Collection Time: 11/17/21  7:30 PM   Result Value Ref Range    Specimen source NASAL SWAB      COVID-19 rapid test Not detected NOTD     HGB & HCT    Collection Time: 11/17/21  8:52 PM   Result Value Ref Range    HGB 9.3 (L) 11.7 - 15.4 g/dL    HCT 27.8 (L) 35.8 - 72.9 %   METABOLIC PANEL, BASIC    Collection Time: 11/18/21  5:01 AM   Result Value Ref Range    Sodium 132 (L) 136 - 145 mmol/L    Potassium 4.0 3.5 - 5.1 mmol/L    Chloride 101 98 - 107 mmol/L    CO2 26 21 - 32 mmol/L    Anion gap 5 (L) 7 - 16 mmol/L    Glucose 81 65 - 100 mg/dL    BUN 18 8 - 23 MG/DL    Creatinine 0.66 0.6 - 1.0 MG/DL    GFR est AA >60 >60 ml/min/1.73m2    GFR est non-AA >60 >60 ml/min/1.73m2    Calcium 8.8 8.3 - 10.4 MG/DL   CBC WITH AUTOMATED DIFF    Collection Time: 11/18/21  5:01 AM   Result Value Ref Range    WBC 4.3 4.3 - 11.1 K/uL    RBC 2.75 (L) 4.05 - 5.2 M/uL    HGB 8.9 (L) 11.7 - 15.4 g/dL    HCT 27.0 (L) 35.8 - 46.3 %    MCV 98.2 (H) 79.6 - 97.8 FL    MCH 32.4 26.1 - 32.9 PG    MCHC 33.0 31.4 - 35.0 g/dL    RDW 14.9 (H) 11.9 - 14.6 %    PLATELET 386 751 - 329 K/uL    MPV 8.9 (L) 9.4 - 12.3 FL    ABSOLUTE NRBC 0.00 0.0 - 0.2 K/uL    DF AUTOMATED      NEUTROPHILS 59 43 - 78 %    LYMPHOCYTES 26 13 - 44 %    MONOCYTES 12 4.0 - 12.0 %    EOSINOPHILS 1 0.5 - 7.8 %    BASOPHILS 1 0.0 - 2.0 %    IMMATURE GRANULOCYTES 1 0.0 - 5.0 %    ABS. NEUTROPHILS 2.6 1.7 - 8.2 K/UL    ABS. LYMPHOCYTES 1.1 0.5 - 4.6 K/UL    ABS. MONOCYTES 0.5 0.1 - 1.3 K/UL    ABS. EOSINOPHILS 0.1 0.0 - 0.8 K/UL    ABS. BASOPHILS 0.0 0.0 - 0.2 K/UL    ABS. IMM.  GRANS. 0.0 0.0 - 0.5 K/UL   HGB & HCT    Collection Time: 11/18/21 12:47 PM   Result Value Ref Range    HGB 8.8 (L) 11.7 - 15.4 g/dL    HCT 26.2 (L) 35.8 - 46.3 %       All Micro Results     Procedure Component Value Units Date/Time    COVID-19 RAPID TEST [[id number]] Collected: 11/17/21 1930    Order Status: Completed Specimen: Nasopharyngeal Updated: 11/17/21 2011     Specimen source NASAL SWAB        COVID-19 rapid test Not detected        Comment:      The specimen is NEGATIVE for SARS-CoV-2, the novel coronavirus associated with COVID-19. A negative result does not rule out COVID-19. This test has been authorized by the FDA under an Emergency Use Authorization (EUA) for use by authorized laboratories. Fact sheet for Healthcare Providers: [employer]nz  Fact sheet for Patients: [employer]nz       Methodology: Isothermal Nucleic Acid Amplification               Other Studies:  XR ABD (KUB)    Result Date: 11/17/2021  KUB CLINICAL INDICATION: Constipation for one week with blood in stools FINDINGS: Two supine views of the abdomen and pelvis submitted. Air and stool noted throughout the colon which is nondilated. There is stool over the rectum. No dilated loops of bowel present to indicate obstruction. Degenerative spondylosis is noted of the lumbar spine. No abnormal calculi present. The patient has bilateral hip hardware. No acute abnormality. Constipation could be considered.       Current Meds:  Current Facility-Administered Medications   Medication Dose Route Frequency    bisacodyL (DULCOLAX) suppository 10 mg  10 mg Rectal DAILY PRN    sodium chloride (NS) flush 5-40 mL  5-40 mL IntraVENous Q8H    sodium chloride (NS) flush 5-40 mL  5-40 mL IntraVENous PRN    ondansetron (ZOFRAN ODT) tablet 4 mg  4 mg Oral Q8H PRN    Or    ondansetron (ZOFRAN) injection 4 mg  4 mg IntraVENous Q6H PRN    polyethylene glycol (MIRALAX) packet 17 g  17 g Oral DAILY    bisacodyL (DULCOLAX) tablet 5 mg  5 mg Oral DAILY PRN    pantoprazole (PROTONIX) 40 mg in 0.9% sodium chloride 10 mL injection  40 mg IntraVENous Q12H    0.9% sodium chloride infusion  75 mL/hr IntraVENous CONTINUOUS    acetaminophen (TYLENOL) tablet 650 mg  650 mg Oral Q6H PRN    Or    acetaminophen (TYLENOL) suppository 650 mg  650 mg Rectal Q6H PRN    amLODIPine (NORVASC) tablet 2.5 mg  2.5 mg Oral Q12H    escitalopram oxalate (LEXAPRO) tablet 20 mg  20 mg Oral DAILY    diclofenac (VOLTAREN) 1 % topical gel 2 g  2 g Topical QID    levothyroxine (SYNTHROID) tablet 75 mcg  75 mcg Oral ACB    LORazepam (ATIVAN) tablet 0.25 mg  0.25 mg Oral BID    promethazine (PHENERGAN) tablet 12.5 mg  12.5 mg Oral QHS PRN    traMADoL (ULTRAM) tablet 50 mg  50 mg Oral Q6H PRN       Signed:  Radha Burton MD    Part of this note may have been written by using a voice dictation software. The note has been proof read but may still contain some grammatical/other typographical errors.

## 2021-11-18 NOTE — PROGRESS NOTES
11/17/21 2158   Dual Skin Pressure Injury Assessment   Dual Skin Pressure Injury Assessment WDL   Second Care Provider (Based on [address] [place] [address]) Joana Leo RN   Skin Integumentary   Skin Integumentary (WDL) X    Pressure  Injury Documentation No Pressure Injury Noted-Pressure Ulcer Prevention Initiated   Skin Color Red; Hypopigmentation  (blanchable redness on buttocks; hypopigmented spot on RLE)   Skin Condition/Temp Warm; Dry   Skin Integrity Intact   Turgor Epidermis thin w/ loss of subcut tissue   Hair Growth Present   Nails X   Varicosities Present   Exceptions to WDL Thick 2.888

## 2021-11-18 NOTE — PROGRESS NOTES
TRANSFER - IN REPORT:    Verbal report received from [facility], RN on Cora Solorzano  being received from ED for routine progression of care      Report consisted of patients Situation, Background, Assessment and   Recommendations(SBAR). Information from the following report(s) SBAR, ED Summary and Recent Results was reviewed with the receiving nurse. Opportunity for questions and clarification was provided. Assessment will be completed upon patients arrival to unit and care assumed.

## 2021-11-19 LAB
BASOPHILS # BLD: 0 K/UL (ref 0–0.2)
BASOPHILS NFR BLD: 1 % (ref 0–2)
DIFFERENTIAL METHOD BLD: ABNORMAL
EOSINOPHIL # BLD: 0 K/UL (ref 0–0.8)
EOSINOPHIL NFR BLD: 0 % (ref 0.5–7.8)
ERYTHROCYTE [DISTWIDTH] IN BLOOD BY AUTOMATED COUNT: 14.6 % (ref 11.9–14.6)
HCT VFR BLD AUTO: 25.9 % (ref 35.8–46.3)
HGB BLD-MCNC: 8.6 G/DL (ref 11.7–15.4)
IMM GRANULOCYTES # BLD AUTO: 0 K/UL (ref 0–0.5)
IMM GRANULOCYTES NFR BLD AUTO: 1 % (ref 0–5)
LYMPHOCYTES # BLD: 0.8 K/UL (ref 0.5–4.6)
LYMPHOCYTES NFR BLD: 13 % (ref 13–44)
MCH RBC QN AUTO: 32 PG (ref 26.1–32.9)
MCHC RBC AUTO-ENTMCNC: 33.2 G/DL (ref 31.4–35)
MCV RBC AUTO: 96.3 FL (ref 79.6–97.8)
MONOCYTES # BLD: 0.4 K/UL (ref 0.1–1.3)
MONOCYTES NFR BLD: 5 % (ref 4–12)
NEUTS SEG # BLD: 5.4 K/UL (ref 1.7–8.2)
NEUTS SEG NFR BLD: 81 % (ref 43–78)
NRBC # BLD: 0 K/UL (ref 0–0.2)
PLATELET # BLD AUTO: 276 K/UL (ref 150–450)
PMV BLD AUTO: 8.9 FL (ref 9.4–12.3)
RBC # BLD AUTO: 2.69 M/UL (ref 4.05–5.2)
WBC # BLD AUTO: 6.6 K/UL (ref 4.3–11.1)

## 2021-11-19 PROCEDURE — 74011250637 HC RX REV CODE- 250/637: Performed by: INTERNAL MEDICINE

## 2021-11-19 PROCEDURE — 36415 COLL VENOUS BLD VENIPUNCTURE: CPT

## 2021-11-19 PROCEDURE — 65270000029 HC RM PRIVATE

## 2021-11-19 PROCEDURE — 85025 COMPLETE CBC W/AUTO DIFF WBC: CPT

## 2021-11-19 PROCEDURE — 74011250637 HC RX REV CODE- 250/637: Performed by: FAMILY MEDICINE

## 2021-11-19 PROCEDURE — G0378 HOSPITAL OBSERVATION PER HR: HCPCS

## 2021-11-19 PROCEDURE — 74011250636 HC RX REV CODE- 250/636: Performed by: INTERNAL MEDICINE

## 2021-11-19 PROCEDURE — 74011000250 HC RX REV CODE- 250: Performed by: INTERNAL MEDICINE

## 2021-11-19 PROCEDURE — C9113 INJ PANTOPRAZOLE SODIUM, VIA: HCPCS | Performed by: INTERNAL MEDICINE

## 2021-11-19 PROCEDURE — 96376 TX/PRO/DX INJ SAME DRUG ADON: CPT

## 2021-11-19 RX ORDER — CARBOXYMETHYLCELLULOSE SODIUM 10 MG/ML
1 GEL OPHTHALMIC AS NEEDED
Status: DISCONTINUED | OUTPATIENT
Start: 2021-11-19 | End: 2021-11-26 | Stop reason: HOSPADM

## 2021-11-19 RX ORDER — SODIUM CHLORIDE TAB 1 GM 1 G
1 TAB MISCELLANEOUS 2 TIMES DAILY WITH MEALS
Status: DISCONTINUED | OUTPATIENT
Start: 2021-11-19 | End: 2021-11-26 | Stop reason: HOSPADM

## 2021-11-19 RX ADMIN — SODIUM CHLORIDE 75 ML/HR: 900 INJECTION, SOLUTION INTRAVENOUS at 05:16

## 2021-11-19 RX ADMIN — DICLOFENAC SODIUM 2 G: 10 GEL TOPICAL at 17:00

## 2021-11-19 RX ADMIN — AMLODIPINE BESYLATE 2.5 MG: 5 TABLET ORAL at 21:00

## 2021-11-19 RX ADMIN — DICLOFENAC SODIUM 2 G: 10 GEL TOPICAL at 21:09

## 2021-11-19 RX ADMIN — TRAMADOL HYDROCHLORIDE 50 MG: 50 TABLET, COATED ORAL at 23:40

## 2021-11-19 RX ADMIN — HYDROCORTISONE ACETATE 25 MG: 25 SUPPOSITORY RECTAL at 10:20

## 2021-11-19 RX ADMIN — LORAZEPAM 0.25 MG: 0.5 TABLET ORAL at 16:19

## 2021-11-19 RX ADMIN — Medication 1 G: at 16:18

## 2021-11-19 RX ADMIN — CARBOXYMETHYLCELLULOSE SODIUM 1 DROP: 10 GEL OPHTHALMIC at 22:35

## 2021-11-19 RX ADMIN — TRAMADOL HYDROCHLORIDE 50 MG: 50 TABLET, COATED ORAL at 16:22

## 2021-11-19 RX ADMIN — POLYETHYLENE GLYCOL 3350 17 G: 17 POWDER, FOR SOLUTION ORAL at 16:19

## 2021-11-19 RX ADMIN — SODIUM CHLORIDE 40 MG: 9 INJECTION INTRAMUSCULAR; INTRAVENOUS; SUBCUTANEOUS at 21:07

## 2021-11-19 RX ADMIN — SODIUM CHLORIDE 40 MG: 9 INJECTION INTRAMUSCULAR; INTRAVENOUS; SUBCUTANEOUS at 09:18

## 2021-11-19 RX ADMIN — DICLOFENAC SODIUM 2 G: 10 GEL TOPICAL at 14:34

## 2021-11-19 RX ADMIN — HYDROCORTISONE ACETATE 25 MG: 25 SUPPOSITORY RECTAL at 22:35

## 2021-11-19 RX ADMIN — Medication 10 ML: at 21:10

## 2021-11-19 RX ADMIN — ESCITALOPRAM OXALATE 20 MG: 10 TABLET ORAL at 09:18

## 2021-11-19 RX ADMIN — LORAZEPAM 0.25 MG: 0.5 TABLET ORAL at 09:18

## 2021-11-19 RX ADMIN — LEVOTHYROXINE SODIUM 75 MCG: 75 TABLET ORAL at 05:15

## 2021-11-19 RX ADMIN — Medication 10 ML: at 06:00

## 2021-11-19 RX ADMIN — POLYETHYLENE GLYCOL 3350 17 G: 17 POWDER, FOR SOLUTION ORAL at 09:18

## 2021-11-19 RX ADMIN — DICLOFENAC SODIUM 2 G: 10 GEL TOPICAL at 09:26

## 2021-11-19 RX ADMIN — Medication 10 ML: at 14:00

## 2021-11-19 NOTE — PROGRESS NOTES
END OF SHIFT NOTE:    Intake/Output  11/19 0701 - 11/19 1900  In: -   Out: 450 [Urine:450]   Voiding: YES  Catheter: NO  Drain:   External Urinary Catheter 11/19/21 (Active)   Site Assessment Clean, dry, & intact 11/19/21 1417   Repositioned No 11/19/21 1417   Perineal Care No 11/19/21 1417   Wick Changed No 11/19/21 1417               Stool:  1 occurrences. Stool Assessment  Stool Color: Chi Mix; Other (Comment) (blood) (11/18/21 1555)  Stool Appearance: Bloody (11/19/21 0745)  Stool Amount: Large (11/18/21 1555)    Emesis:  0 occurrences. VITAL SIGNS  Patient Vitals for the past 12 hrs:   Temp Pulse Resp BP SpO2   11/19/21 1539 98.2 °F (36.8 °C) 77 18 125/72 98 %   11/19/21 1129 97.7 °F (36.5 °C) 98 17 (!) 112/54 98 %   11/19/21 0745     100 %   11/19/21 0725 97.7 °F (36.5 °C) 67 18 118/63 100 %       Pain Assessment  Pain 1  Pain Scale 1: Numeric (0 - 10) (11/19/21 1710)  Pain Intensity 1: 0 (11/19/21 1710)  Patient Stated Pain Goal: 0 (11/19/21 1710)  Pain Reassessment 1: Yes (11/19/21 1710)  Pain Onset 1: PTA (11/18/21 1950)  Pain Location 1: Leg (11/19/21 1623)  Pain Orientation 1: Lower (11/19/21 1623)  Pain Description 1: Aching; Constant (11/19/21 1623)  Pain Intervention(s) 1: Medication (see MAR) (11/19/21 1623)    Ambulating  No    Additional Information: sodium pill denis, MD notified about large amount of blood in BSC, this RN observed bleed coming from vaginal canal, bed alarm on, requires assistance eating and pills crushed and given in applesauce    Shift report given to oncoming nurse at the bedside.     Rebecca Garcia

## 2021-11-19 NOTE — PROGRESS NOTES
Hospitalist Progress Note   Admit Date:  2021  2:04 PM   Name:  Tonie Ríos   Age:  80 y.o. Sex:  female  :  1926   MRN:  [id number]   Room:  [facility]/    Presenting Complaint: Constipation    Reason(s) for Admission: Lower GI bleed [K92.2]  Acute blood loss anemia [D62]     Hospital Course & Interval History:   Mrs. Rodney Avila is a very nice 81 y/o WF with a h/o CAD, HTN, chronic hyponatremia who was admitted to our service on  with acute blood loss anemia and hematochezia. Hb 1 week prior to admission was in the 10s; 3 months ago was high 11. On presentation it was 9.7. She describes nothing but blood during each BM. She was admitted and started on IVFs, PPI, serial H/H GI consult. Suppository for severe constipation. Subjective (21): Says she feels weak. Hb is stable. Assessment & Plan:   # Acute blood loss anemia 2/2 lower GI bleed   - Hb 11 about 1 week PTA, now stable around 8s. Anusol suppositories, aggressive treatment of constipation. Appreciate GI help, no plans for endoscopy per their discussion with patient and family. Stop fluids today.     # HTN              - Norvasc, resume home sodium tabs per request     # Hypothyroidism              - Synthroid     # MDD/anxiety              - Lexapro, low dose Ativan     Dispo/Discharge Planning:   Diet:  ADULT DIET Regular; Low Fat/Low Chol/High Fiber/LILLI; No Red Dye  DVT PPx: SCDs only.   Code status: Full Code    Hospital Problems as of 2021 Date Reviewed: 2021          Codes Class Noted - Resolved POA    * (Principal) Acute blood loss anemia ICD-10-CM: D62  ICD-9-CM: 285.1  2021 - Present Yes        MDD (major depressive disorder) ICD-10-CM: F32.9  ICD-9-CM: 296.20  2021 - Present Yes        Anxiety ICD-10-CM: F41.9  ICD-9-CM: 300.00  2021 - Present Yes        Hypertension ICD-10-CM: I10  ICD-9-CM: 401.9  2021 - Present Yes        Acquired hypothyroidism ICD-10-CM: E03.9  ICD-9-CM: 244.9 11/18/2021 - Present Yes        Lower GI bleed ICD-10-CM: K92.2  ICD-9-CM: 578.9  11/17/2021 - Present Yes              Objective:     Patient Vitals for the past 24 hrs:   Temp Pulse Resp BP SpO2   11/19/21 1129 97.7 °F (36.5 °C) 98 17 (!) 112/54 98 %   11/19/21 0745     100 %   11/19/21 0725 97.7 °F (36.5 °C) 67 18 118/63 100 %   11/19/21 0318 97.9 °F (36.6 °C) 72 18 137/69 95 %   11/18/21 2354 98.1 °F (36.7 °C) 61 18 (!) 112/52 97 %   11/18/21 1956 97.9 °F (36.6 °C) 65 20 119/63 98 %   11/18/21 1555 98.1 °F (36.7 °C) 65 12 (!) 117/56 99 %     Oxygen Therapy  O2 Sat (%): 98 % (11/19/21 1129)  Pulse via Oximetry: 67 beats per minute (11/19/21 0745)    There is no height or weight on file to calculate BMI. Intake/Output Summary (Last 24 hours) at 11/19/2021 1302  Last data filed at 11/19/2021 0516  Gross per 24 hour   Intake 1868 ml   Output 150 ml   Net 1718 ml         Physical Exam:   Blood pressure (!) 112/54, pulse 98, temperature 97.7 °F (36.5 °C), resp. rate 17, SpO2 98 %. General:    Well nourished. No overt distress. Appears stated age. Weak appearing. Head:  Normocephalic, atraumatic  Eyes:  Sclerae appear normal.  Pupils equally round. ENT:  Nares appear normal, no drainage. Moist oral mucosa  Neck:  No restricted ROM. Trachea midline   CV:   RRR. No m/r/g. No jugular venous distension. Lungs:   CTAB. No wheezing, rhonchi, or rales. Respirations even, unlabored. Abdomen: Bowel sounds present. Soft, nontender, nondistended. Extremities: No cyanosis or clubbing. No edema. Skin:     No rashes and normal coloration. Warm and dry. Neuro:  CN II-XII grossly intact. Sensation intact. A&Ox3  Psych:  Normal mood and affect.       I have reviewed ordered lab tests and independently visualized imaging below:    Recent Labs:  Recent Results (from the past 48 hour(s))   TYPE & SCREEN    Collection Time: 11/17/21  2:59 PM   Result Value Ref Range    Crossmatch Expiration 11/20/2021,2720 ABO/Rh(D) A POSITIVE     Antibody screen NEG    HGB & HCT    Collection Time: 11/17/21  2:59 PM   Result Value Ref Range    HGB 9.7 (L) 11.7 - 15.4 g/dL    HCT 28.9 (L) 35.8 - 46.3 %   COVID-19 RAPID TEST    Collection Time: 11/17/21  7:30 PM   Result Value Ref Range    Specimen source NASAL SWAB      COVID-19 rapid test Not detected NOTD     HGB & HCT    Collection Time: 11/17/21  8:52 PM   Result Value Ref Range    HGB 9.3 (L) 11.7 - 15.4 g/dL    HCT 27.8 (L) 35.8 - 97.1 %   METABOLIC PANEL, BASIC    Collection Time: 11/18/21  5:01 AM   Result Value Ref Range    Sodium 132 (L) 136 - 145 mmol/L    Potassium 4.0 3.5 - 5.1 mmol/L    Chloride 101 98 - 107 mmol/L    CO2 26 21 - 32 mmol/L    Anion gap 5 (L) 7 - 16 mmol/L    Glucose 81 65 - 100 mg/dL    BUN 18 8 - 23 MG/DL    Creatinine 0.66 0.6 - 1.0 MG/DL    GFR est AA >60 >60 ml/min/1.73m2    GFR est non-AA >60 >60 ml/min/1.73m2    Calcium 8.8 8.3 - 10.4 MG/DL   CBC WITH AUTOMATED DIFF    Collection Time: 11/18/21  5:01 AM   Result Value Ref Range    WBC 4.3 4.3 - 11.1 K/uL    RBC 2.75 (L) 4.05 - 5.2 M/uL    HGB 8.9 (L) 11.7 - 15.4 g/dL    HCT 27.0 (L) 35.8 - 46.3 %    MCV 98.2 (H) 79.6 - 97.8 FL    MCH 32.4 26.1 - 32.9 PG    MCHC 33.0 31.4 - 35.0 g/dL    RDW 14.9 (H) 11.9 - 14.6 %    PLATELET 258 118 - 083 K/uL    MPV 8.9 (L) 9.4 - 12.3 FL    ABSOLUTE NRBC 0.00 0.0 - 0.2 K/uL    DF AUTOMATED      NEUTROPHILS 59 43 - 78 %    LYMPHOCYTES 26 13 - 44 %    MONOCYTES 12 4.0 - 12.0 %    EOSINOPHILS 1 0.5 - 7.8 %    BASOPHILS 1 0.0 - 2.0 %    IMMATURE GRANULOCYTES 1 0.0 - 5.0 %    ABS. NEUTROPHILS 2.6 1.7 - 8.2 K/UL    ABS. LYMPHOCYTES 1.1 0.5 - 4.6 K/UL    ABS. MONOCYTES 0.5 0.1 - 1.3 K/UL    ABS. EOSINOPHILS 0.1 0.0 - 0.8 K/UL    ABS. BASOPHILS 0.0 0.0 - 0.2 K/UL    ABS. IMM.  GRANS. 0.0 0.0 - 0.5 K/UL   HGB & HCT    Collection Time: 11/18/21 12:47 PM   Result Value Ref Range    HGB 8.8 (L) 11.7 - 15.4 g/dL    HCT 26.2 (L) 35.8 - 46.3 %   CBC WITH AUTOMATED DIFF Collection Time: 11/19/21  5:49 AM   Result Value Ref Range    WBC 6.6 4.3 - 11.1 K/uL    RBC 2.69 (L) 4.05 - 5.2 M/uL    HGB 8.6 (L) 11.7 - 15.4 g/dL    HCT 25.9 (L) 35.8 - 46.3 %    MCV 96.3 79.6 - 97.8 FL    MCH 32.0 26.1 - 32.9 PG    MCHC 33.2 31.4 - 35.0 g/dL    RDW 14.6 11.9 - 14.6 %    PLATELET 990 309 - 117 K/uL    MPV 8.9 (L) 9.4 - 12.3 FL    ABSOLUTE NRBC 0.00 0.0 - 0.2 K/uL    DF AUTOMATED      NEUTROPHILS 81 (H) 43 - 78 %    LYMPHOCYTES 13 13 - 44 %    MONOCYTES 5 4.0 - 12.0 %    EOSINOPHILS 0 (L) 0.5 - 7.8 %    BASOPHILS 1 0.0 - 2.0 %    IMMATURE GRANULOCYTES 1 0.0 - 5.0 %    ABS. NEUTROPHILS 5.4 1.7 - 8.2 K/UL    ABS. LYMPHOCYTES 0.8 0.5 - 4.6 K/UL    ABS. MONOCYTES 0.4 0.1 - 1.3 K/UL    ABS. EOSINOPHILS 0.0 0.0 - 0.8 K/UL    ABS. BASOPHILS 0.0 0.0 - 0.2 K/UL    ABS. IMM. GRANS. 0.0 0.0 - 0.5 K/UL       All Micro Results     Procedure Component Value Units Date/Time    COVID-19 RAPID TEST [[id number]] Collected: 11/17/21 1930    Order Status: Completed Specimen: Nasopharyngeal Updated: 11/17/21 2011     Specimen source NASAL SWAB        COVID-19 rapid test Not detected        Comment:      The specimen is NEGATIVE for SARS-CoV-2, the novel coronavirus associated with COVID-19. A negative result does not rule out COVID-19. This test has been authorized by the FDA under an Emergency Use Authorization (EUA) for use by authorized laboratories. Fact sheet for Healthcare Providers: [employer]Update[employer]co.nz  Fact sheet for Patients: [employer]Update[employer]co.nz       Methodology: Isothermal Nucleic Acid Amplification               Other Studies:  No results found.     Current Meds:  Current Facility-Administered Medications   Medication Dose Route Frequency    sodium chloride tablet 1 g  1 g Oral BID WITH MEALS    bisacodyL (DULCOLAX) suppository 10 mg  10 mg Rectal DAILY PRN    lip protectant (BLISTEX) ointment 1 Each  1 Each Topical PRN    hydrocortisone (ANUSOL-HC) suppository 25 mg  25 mg Rectal Q12H    polyethylene glycol (MIRALAX) packet 17 g  17 g Oral BID    sodium chloride (NS) flush 5-40 mL  5-40 mL IntraVENous Q8H    sodium chloride (NS) flush 5-40 mL  5-40 mL IntraVENous PRN    ondansetron (ZOFRAN ODT) tablet 4 mg  4 mg Oral Q8H PRN    Or    ondansetron (ZOFRAN) injection 4 mg  4 mg IntraVENous Q6H PRN    bisacodyL (DULCOLAX) tablet 5 mg  5 mg Oral DAILY PRN    pantoprazole (PROTONIX) 40 mg in 0.9% sodium chloride 10 mL injection  40 mg IntraVENous Q12H    acetaminophen (TYLENOL) tablet 650 mg  650 mg Oral Q6H PRN    Or    acetaminophen (TYLENOL) suppository 650 mg  650 mg Rectal Q6H PRN    amLODIPine (NORVASC) tablet 2.5 mg  2.5 mg Oral Q12H    escitalopram oxalate (LEXAPRO) tablet 20 mg  20 mg Oral DAILY    diclofenac (VOLTAREN) 1 % topical gel 2 g  2 g Topical QID    levothyroxine (SYNTHROID) tablet 75 mcg  75 mcg Oral ACB    LORazepam (ATIVAN) tablet 0.25 mg  0.25 mg Oral BID    promethazine (PHENERGAN) tablet 12.5 mg  12.5 mg Oral QHS PRN    traMADoL (ULTRAM) tablet 50 mg  50 mg Oral Q6H PRN       Signed:  Radha Burton MD    Part of this note may have been written by using a voice dictation software. The note has been proof read but may still contain some grammatical/other typographical errors.

## 2021-11-19 NOTE — PROGRESS NOTES
[facility] Progress Note         Admit Date:  11/17/2021    Today's Date:  11/19/2021    CC:  Rectal bleeding, anemia    Subjective:     Patient reports some red blood in commode with stool today. Denies abdominal pain. Wants diet changed as she has difficulty eating/chewing. Medications:   Current Facility-Administered Medications   Medication Dose Route Frequency    sodium chloride tablet 1 g  1 g Oral BID WITH MEALS    bisacodyL (DULCOLAX) suppository 10 mg  10 mg Rectal DAILY PRN    lip protectant (BLISTEX) ointment 1 Each  1 Each Topical PRN    hydrocortisone (ANUSOL-HC) suppository 25 mg  25 mg Rectal Q12H    polyethylene glycol (MIRALAX) packet 17 g  17 g Oral BID    sodium chloride (NS) flush 5-40 mL  5-40 mL IntraVENous Q8H    sodium chloride (NS) flush 5-40 mL  5-40 mL IntraVENous PRN    ondansetron (ZOFRAN ODT) tablet 4 mg  4 mg Oral Q8H PRN    Or    ondansetron (ZOFRAN) injection 4 mg  4 mg IntraVENous Q6H PRN    bisacodyL (DULCOLAX) tablet 5 mg  5 mg Oral DAILY PRN    pantoprazole (PROTONIX) 40 mg in 0.9% sodium chloride 10 mL injection  40 mg IntraVENous Q12H    acetaminophen (TYLENOL) tablet 650 mg  650 mg Oral Q6H PRN    Or    acetaminophen (TYLENOL) suppository 650 mg  650 mg Rectal Q6H PRN    amLODIPine (NORVASC) tablet 2.5 mg  2.5 mg Oral Q12H    escitalopram oxalate (LEXAPRO) tablet 20 mg  20 mg Oral DAILY    diclofenac (VOLTAREN) 1 % topical gel 2 g  2 g Topical QID    levothyroxine (SYNTHROID) tablet 75 mcg  75 mcg Oral ACB    LORazepam (ATIVAN) tablet 0.25 mg  0.25 mg Oral BID    promethazine (PHENERGAN) tablet 12.5 mg  12.5 mg Oral QHS PRN    traMADoL (ULTRAM) tablet 50 mg  50 mg Oral Q6H PRN       Review of Systems:  ROS was obtained, with pertinent positives as listed above. No chest pain or SOB.     Diet:      Objective:   Vitals:  Visit Vitals  BP (!) 112/54 (BP 1 Location: Right upper arm, BP Patient Position: Semi fowlers) Comment: Primary RN Notified   Pulse 98   Temp 97.7 °F (36.5 °C)   Resp 17   SpO2 98%     Intake/Output:  No intake/output data recorded. 11/17 1901 - 11/19 0700  In: 2860 [P.O.:520; I.V.:2340]  Out: 150 [Urine:150]  Exam:  General appearance: alert, cooperative, no distress  Lungs: clear to auscultation bilaterally anteriorly  Heart: regular rate and rhythm  Abdomen: soft, non-tender. Bowel sounds normal. No masses, no organomegaly  Extremities: extremities normal, atraumatic, no cyanosis or edema  Neuro:  alert and oriented    Data Review (Labs):    Recent Labs     11/19/21  0549 11/18/21  1247 11/18/21  0501 11/17/21 2052 11/17/21  1459   WBC 6.6  --  4.3  --   --    HGB 8.6* 8.8* 8.9* 9.3* 9.7*   HCT 25.9* 26.2* 27.0* 27.8* 28.9*     --  271  --   --    MCV 96.3  --  98.2*  --   --    NA  --   --  132*  --   --    K  --   --  4.0  --   --    CL  --   --  101  --   --    CO2  --   --  26  --   --    BUN  --   --  18  --   --    CREA  --   --  0.66  --   --    CA  --   --  8.8  --   --    GLU  --   --  81  --   --        Assessment:     Principal Problem:    Acute blood loss anemia (11/18/2021)    Active Problems:    Lower GI bleed (11/17/2021)      MDD (major depressive disorder) (11/18/2021)      Anxiety (11/18/2021)      Hypertension (11/18/2021)      Acquired hypothyroidism (11/18/2021)    81 yo female admitted by the hospitalist service for rectal bleeding with drop in hgb. She has had a 1 gm drop in hgb overnight and admits to chronic constipation at home. Etiology of bleeding likely hemorrhoidal vs. fissure vs. less likely malignancy. Still with some small amounts of blood today. Hgb down to 8.6 this morning. Plan:     1. Follow hgb  2. Change to soft diet as patient reports difficulty with mastication  3. Continue treatment for constipation with Miralax and stool softeners  4.   Patient and family do not wish to pursue aggressive therapy at present; we will defer endoscopy at this time    Patient is seen and examined in collaboration with Dr. Zara Merritt. Assessment and plan as per Dr. Zara Merritt.   William Saleh NP Attending Attestation (For Attendings USE Only)...

## 2021-11-19 NOTE — PROGRESS NOTES
END OF SHIFT NOTE:    Intake/Output  No intake/output data recorded. Voiding: yes  Catheter: NO  Drain:    Stool:  1 occurrences. Stool Assessment  Stool Color: Alinda Terrence; Other (Comment) (blood) (11/18/21 1555)  Stool Appearance: Bloody; Soft; Formed (11/18/21 1555)  Stool Amount: Large (11/18/21 1555)    Emesis:  0 occurrences. VITAL SIGNS  Patient Vitals for the past 12 hrs:   Temp Pulse Resp BP SpO2   11/18/21 1555 98.1 °F (36.7 °C) 65 12 (!) 117/56 99 %   11/18/21 1132 96.8 °F (36 °C) 86 10 127/73 99 %   11/18/21 0738 (!) 96.7 °F (35.9 °C) 68 10 120/70 98 %       Pain Assessment  Pain 1  Pain Scale 1: Numeric (0 - 10) (11/18/21 0738)  Pain Intensity 1: 8 (11/18/21 6273)  Patient Stated Pain Goal: 0 (11/17/21 6575)    Ambulating  No  Bed Rest  Additional Information:      Shift report given to oncoming nurse Brittany Trujillo, EMBER at the bedside.     Rose Haas

## 2021-11-19 NOTE — PROGRESS NOTES
Problem: Pressure Injury - Risk of  Goal: *Prevention of pressure injury  Description: Document Ralph Scale and appropriate interventions in the flowsheet.   Outcome: Progressing Towards Goal  Note: Pressure Injury Interventions:  Sensory Interventions: Assess need for specialty bed, Keep linens dry and wrinkle-free, Monitor skin under medical devices, Pressure redistribution bed/mattress (bed type)    Moisture Interventions: Assess need for specialty bed, Check for incontinence Q2 hours and as needed    Activity Interventions: Assess need for specialty bed, Pressure redistribution bed/mattress(bed type)    Mobility Interventions: Assess need for specialty bed, Pressure redistribution bed/mattress (bed type)    Nutrition Interventions: Document food/fluid/supplement intake, Offer support with meals,snacks and hydration    Friction and Shear Interventions: Foam dressings/transparent film/skin sealants, Minimize layers

## 2021-11-19 NOTE — PROGRESS NOTES
END OF SHIFT NOTE:    Intake/Output  No intake/output data recorded. Voiding: YES  Catheter: NO      Stool:  0 occurrences. Stool Assessment  Stool Color: Icelandic Rang; Other (Comment) (blood) (11/18/21 1555)  Stool Appearance: Bloody; Soft; Formed (11/18/21 1555)  Stool Amount: Large (11/18/21 1555)    Emesis:  0 occurrences. VITAL SIGNS  Patient Vitals for the past 12 hrs:   Temp Pulse Resp BP SpO2   11/19/21 0318 97.9 °F (36.6 °C) 72 18 137/69 95 %   11/18/21 2354 98.1 °F (36.7 °C) 61 18 (!) 112/52 97 %   11/18/21 1956 97.9 °F (36.6 °C) 65 20 119/63 98 %       Pain Assessment  Pain 1  Pain Scale 1: Visual (11/18/21 2030)  Pain Intensity 1: 0 (11/18/21 2030)  Patient Stated Pain Goal: 0 (11/18/21 1950)  Pain Reassessment 1: Patient resting w/respiratory rate greater than 10 (11/18/21 2030)  Pain Onset 1: PTA (11/18/21 1950)  Pain Location 1: Leg (11/18/21 1950)  Pain Orientation 1: Left; Right (11/18/21 1950)  Pain Description 1: Aching; Constant (11/18/21 1950)  Pain Intervention(s) 1: Medication (see MAR) (11/18/21 1950)    Ambulating  Yes    Additional Information:   - PRN tramadol 1x   - pt resting, no needs voiced at this time    Shift report given to oncoming nurse at the bedside.     Whit Bergeron RN

## 2021-11-20 LAB
HCT VFR BLD AUTO: 24.6 % (ref 35.8–46.3)
HGB BLD-MCNC: 8.2 G/DL (ref 11.7–15.4)

## 2021-11-20 PROCEDURE — C9113 INJ PANTOPRAZOLE SODIUM, VIA: HCPCS | Performed by: INTERNAL MEDICINE

## 2021-11-20 PROCEDURE — 74011250637 HC RX REV CODE- 250/637: Performed by: INTERNAL MEDICINE

## 2021-11-20 PROCEDURE — 97161 PT EVAL LOW COMPLEX 20 MIN: CPT

## 2021-11-20 PROCEDURE — 97530 THERAPEUTIC ACTIVITIES: CPT

## 2021-11-20 PROCEDURE — 97165 OT EVAL LOW COMPLEX 30 MIN: CPT

## 2021-11-20 PROCEDURE — 36415 COLL VENOUS BLD VENIPUNCTURE: CPT

## 2021-11-20 PROCEDURE — 97535 SELF CARE MNGMENT TRAINING: CPT

## 2021-11-20 PROCEDURE — 74011000250 HC RX REV CODE- 250: Performed by: INTERNAL MEDICINE

## 2021-11-20 PROCEDURE — 74011250636 HC RX REV CODE- 250/636: Performed by: INTERNAL MEDICINE

## 2021-11-20 PROCEDURE — 85018 HEMOGLOBIN: CPT

## 2021-11-20 PROCEDURE — 65270000029 HC RM PRIVATE

## 2021-11-20 RX ADMIN — DICLOFENAC SODIUM 2 G: 10 GEL TOPICAL at 22:05

## 2021-11-20 RX ADMIN — DICLOFENAC SODIUM 2 G: 10 GEL TOPICAL at 13:30

## 2021-11-20 RX ADMIN — TRAMADOL HYDROCHLORIDE 50 MG: 50 TABLET, COATED ORAL at 19:30

## 2021-11-20 RX ADMIN — Medication 10 ML: at 14:27

## 2021-11-20 RX ADMIN — CARBOXYMETHYLCELLULOSE SODIUM 1 DROP: 10 GEL OPHTHALMIC at 08:38

## 2021-11-20 RX ADMIN — AMLODIPINE BESYLATE 2.5 MG: 5 TABLET ORAL at 08:11

## 2021-11-20 RX ADMIN — POLYETHYLENE GLYCOL 3350 17 G: 17 POWDER, FOR SOLUTION ORAL at 08:12

## 2021-11-20 RX ADMIN — LEVOTHYROXINE SODIUM 75 MCG: 75 TABLET ORAL at 05:37

## 2021-11-20 RX ADMIN — Medication 10 ML: at 05:37

## 2021-11-20 RX ADMIN — Medication 10 ML: at 22:05

## 2021-11-20 RX ADMIN — SODIUM CHLORIDE 40 MG: 9 INJECTION INTRAMUSCULAR; INTRAVENOUS; SUBCUTANEOUS at 22:04

## 2021-11-20 RX ADMIN — Medication 1 G: at 08:11

## 2021-11-20 RX ADMIN — POLYETHYLENE GLYCOL 3350 17 G: 17 POWDER, FOR SOLUTION ORAL at 17:07

## 2021-11-20 RX ADMIN — LORAZEPAM 0.25 MG: 0.5 TABLET ORAL at 17:07

## 2021-11-20 RX ADMIN — LORAZEPAM 0.25 MG: 0.5 TABLET ORAL at 08:10

## 2021-11-20 RX ADMIN — ESCITALOPRAM OXALATE 20 MG: 10 TABLET ORAL at 08:11

## 2021-11-20 RX ADMIN — DICLOFENAC SODIUM 2 G: 10 GEL TOPICAL at 08:13

## 2021-11-20 RX ADMIN — SODIUM CHLORIDE 40 MG: 9 INJECTION INTRAMUSCULAR; INTRAVENOUS; SUBCUTANEOUS at 08:14

## 2021-11-20 RX ADMIN — DICLOFENAC SODIUM 2 G: 10 GEL TOPICAL at 17:07

## 2021-11-20 RX ADMIN — Medication 1 G: at 17:07

## 2021-11-20 RX ADMIN — HYDROCORTISONE ACETATE 25 MG: 25 SUPPOSITORY RECTAL at 08:35

## 2021-11-20 NOTE — PROGRESS NOTES
END OF SHIFT NOTE:    Eyedrops ordered  tramadol 1x for leg pain  pills crushed and given in applesauce  Pt has not had BM this shift, scant dried blood noted milad  VSS, no c/o of N/V/D, no needs voiced at this time    Intake/Output  11/19 1901 - 11/20 0700  In: 355 [P.O.:355]  Out: 350 [Urine:350]   Voiding: YES  Catheter: NO  Drain:   External Urinary Catheter 11/19/21 (Active)   Site Assessment Clean, dry, & intact 11/20/21 0310   Repositioned Yes 11/20/21 0310   Perineal Care No 11/20/21 0310   Wick Changed No 11/20/21 0310   Suction Canister/Tubing Changed No 11/20/21 0310               Stool:  0 occurrences. Stool Assessment  Stool Color: Portuguese Rang; Other (Comment) (blood) (11/18/21 1555)  Stool Appearance: Bloody (per previous documentation, this RN not visualized) (11/19/21 2025)  Stool Amount: Large (11/18/21 1555)    Emesis:  0 occurrences. VITAL SIGNS  Patient Vitals for the past 12 hrs:   Temp Pulse Resp BP SpO2   11/20/21 0253 98.2 °F (36.8 °C) 70  110/63 98 %   11/20/21 0018 98 °F (36.7 °C) 72 18 103/61 93 %   11/19/21 2051 98 °F (36.7 °C) 72 17 129/70 99 %       Pain Assessment  Pain 1  Pain Scale 1: Visual (pt sleeping soundly) (11/20/21 0310)  Pain Intensity 1: 0 (11/20/21 0310)  Patient Stated Pain Goal: 0 (11/20/21 0310)  Pain Reassessment 1: Patient resting w/respiratory rate greater than 10 (11/20/21 0018)  Pain Onset 1: PTA (11/18/21 1950)  Pain Location 1: Leg (11/19/21 2340)  Pain Orientation 1: Lower (11/19/21 2340)  Pain Description 1: Aching; Constant (11/19/21 2340)  Pain Intervention(s) 1: Medication (see MAR) (11/19/21 2340)    Ambulating  No    Additional Information:     Shift report will be given to Pau Montiel RN at the bedside.     Syl Diaz

## 2021-11-20 NOTE — PROGRESS NOTES
Problem: Pressure Injury - Risk of  Goal: *Prevention of pressure injury  Description: Document Ralph Scale and appropriate interventions in the flowsheet. Outcome: Progressing Towards Goal  Note: Pressure Injury Interventions:  Sensory Interventions: Assess need for specialty bed, Assess changes in LOC, Check visual cues for pain    Moisture Interventions: Absorbent underpads, Apply protective barrier, creams and emollients, Assess need for specialty bed, Check for incontinence Q2 hours and as needed    Activity Interventions: Assess need for specialty bed, Pressure redistribution bed/mattress(bed type)    Mobility Interventions: Assess need for specialty bed, HOB 30 degrees or less    Nutrition Interventions: Document food/fluid/supplement intake, Offer support with meals,snacks and hydration    Friction and Shear Interventions: Foam dressings/transparent film/skin sealants, Minimize layers                Problem: Falls - Risk of  Goal: *Absence of Falls  Description: Document Fernando Fall Risk and appropriate interventions in the flowsheet.   Outcome: Progressing Towards Goal  Note: Fall Risk Interventions:  Mobility Interventions: Communicate number of staff needed for ambulation/transfer    Mentation Interventions: Door open when patient unattended    Medication Interventions: Patient to call before getting OOB    Elimination Interventions: Call light in reach, Patient to call for help with toileting needs    History of Falls Interventions: Door open when patient unattended, Room close to nurse's station

## 2021-11-20 NOTE — PROGRESS NOTES
[facility] Progress Note         Admit Date:  11/17/2021    Today's Date:  11/20/2021    CC:  Rectal bleeding, anemia    Subjective:     No further bleeding since day shift yesterday, per RN. ?some question of whether blood may be from vaginal source, per RN. Otherwise, patient denies complaints, namely abd pain, n/v.    Medications:   Current Facility-Administered Medications   Medication Dose Route Frequency    sodium chloride tablet 1 g  1 g Oral BID WITH MEALS    carboxymethylcellulose sodium (REFRESH LIQUIGEL) 1 % ophthalmic solution 1 Drop  1 Drop Both Eyes PRN    bisacodyL (DULCOLAX) suppository 10 mg  10 mg Rectal DAILY PRN    lip protectant (BLISTEX) ointment 1 Each  1 Each Topical PRN    hydrocortisone (ANUSOL-HC) suppository 25 mg  25 mg Rectal Q12H    polyethylene glycol (MIRALAX) packet 17 g  17 g Oral BID    sodium chloride (NS) flush 5-40 mL  5-40 mL IntraVENous Q8H    sodium chloride (NS) flush 5-40 mL  5-40 mL IntraVENous PRN    ondansetron (ZOFRAN ODT) tablet 4 mg  4 mg Oral Q8H PRN    Or    ondansetron (ZOFRAN) injection 4 mg  4 mg IntraVENous Q6H PRN    bisacodyL (DULCOLAX) tablet 5 mg  5 mg Oral DAILY PRN    pantoprazole (PROTONIX) 40 mg in 0.9% sodium chloride 10 mL injection  40 mg IntraVENous Q12H    acetaminophen (TYLENOL) tablet 650 mg  650 mg Oral Q6H PRN    Or    acetaminophen (TYLENOL) suppository 650 mg  650 mg Rectal Q6H PRN    amLODIPine (NORVASC) tablet 2.5 mg  2.5 mg Oral Q12H    escitalopram oxalate (LEXAPRO) tablet 20 mg  20 mg Oral DAILY    diclofenac (VOLTAREN) 1 % topical gel 2 g  2 g Topical QID    levothyroxine (SYNTHROID) tablet 75 mcg  75 mcg Oral ACB    LORazepam (ATIVAN) tablet 0.25 mg  0.25 mg Oral BID    promethazine (PHENERGAN) tablet 12.5 mg  12.5 mg Oral QHS PRN    traMADoL (ULTRAM) tablet 50 mg  50 mg Oral Q6H PRN       Review of Systems:  ROS was obtained, with pertinent positives as listed above.   No chest pain or SOB.    Diet:      Objective:   Vitals:  Visit Vitals  /73 (BP 1 Location: Right upper arm, BP Patient Position: Supine)   Pulse 63   Temp 98.2 °F (36.8 °C)   Resp 18   SpO2 97%     Intake/Output:  No intake/output data recorded. 11/18 1901 - 11/20 0700  In: 1703 [P.O.:355; I.V.:1348]  Out: 950 [Urine:950]  Exam:  General appearance: alert, cooperative, elderly female in no distress  Lungs: clear to auscultation bilaterally anteriorly  Heart: regular rate and rhythm  Abdomen: soft, non-tender, bowel sounds normal, no masses  Extremities: extremities normal, atraumatic, no cyanosis or edema  Neuro:  alert and oriented    Data Review (Labs):    Recent Labs     11/20/21  0259 11/19/21  0549 11/18/21  1247 11/18/21  0501 11/17/21 2052 11/17/21  1459   WBC  --  6.6  --  4.3  --   --    HGB 8.2* 8.6* 8.8* 8.9* 9.3* 9.7*   HCT 24.6* 25.9* 26.2* 27.0* 27.8* 28.9*   PLT  --  276  --  271  --   --    MCV  --  96.3  --  98.2*  --   --    NA  --   --   --  132*  --   --    K  --   --   --  4.0  --   --    CL  --   --   --  101  --   --    CO2  --   --   --  26  --   --    BUN  --   --   --  18  --   --    CREA  --   --   --  0.66  --   --    CA  --   --   --  8.8  --   --    GLU  --   --   --  81  --   --        Assessment:     Principal Problem:    Acute blood loss anemia (11/18/2021)    Active Problems:    Lower GI bleed (11/17/2021)      MDD (major depressive disorder) (11/18/2021)      Anxiety (11/18/2021)      Hypertension (11/18/2021)      Acquired hypothyroidism (11/18/2021)    79 yo female with PMH of CAD, osteoporosis, HTN, chronic hyponatremia, chronic anxiety, hemorrhoids admitted by the hospitalist service for rectal bleeding with drop in hgb. Etiology of bleeding likely hemorrhoidal vs. fissure vs. less likely malignancy. Last bleeding was on day shift 11/19, RN questions whether source may be vaginal.  Hgb relatively stable overnight.       Plan:     - Monitor Hgb, transfuse PRN  - Continue soft diet given difficulty with mastication  - Continue bowel regimen of Miralax and stool softeners, hold for diarrhea  - Patient and family do not wish to pursue aggressive therapy at present; we will defer endoscopy at this time  - Consider GYN consult to evaluate for possible vaginal bleeding      Lacie Hooks M.D. [facility]

## 2021-11-20 NOTE — CONSULTS
GYN Consult Note    CC: Constipation      Mindi Painting is a 80 y.o. CF  who was admitted  for acute blood loss anemia. We were consulted today for concern for vaginal bleeding. Patient is not a good historian but does report a hysterectomy years ago for prolapse. Has not seen a Gyn recently. She states she only bleeds with BM, does not notice bleeding otherwise. No reported discharge or vaginal irritation. History:  No past medical history on file. No past surgical history on file. Social History     Occupational History    Not on file   Tobacco Use    Smoking status: Not on file    Smokeless tobacco: Not on file   Substance and Sexual Activity    Alcohol use: Not on file    Drug use: Not on file    Sexual activity: Not on file     No family history on file. OB History   No obstetric history on file. Allergies:  No Known Allergies    Review of Systems:  A comprehensive review of systems was negative. GYN History:  History of abnormal Pap smears: no  History of STD: none  OB History   No obstetric history on file.   2 SVDs    Family History:  Unable to obtain      Physical Exam:   Admission Vitals:   Patient Vitals for the past 24 hrs:   BP Temp Pulse Resp SpO2   21 1046 104/62 98.2 °F (36.8 °C) 68 18 97 %   21 0743 129/73 98.2 °F (36.8 °C) 63 18 97 %   21 0253 110/63 98.2 °F (36.8 °C) 70  98 %   21 0018 103/61 98 °F (36.7 °C) 72 18 93 %   21 2051 129/70 98 °F (36.7 °C) 72 17 99 %   21 1539 125/72 98.2 °F (36.8 °C) 77 18 98 %       General: Pt in NAD, sitting comfortably on bed.   HEENT: NCAT  CV: RRR, no murmurs, rubs or gallops  Lungs: CTAB, no wheezes, rales or rhonchi  Abdomen: soft, nontender, Nondistended, no rebound or guarding  Extremities: no cyanosis, clubbing, or edema  Skin: No rashes or lesions  Neuro: Grossly normal  Psych: Normal mood, flat affect    Pelvic Exam:  External Genitalia: no skin changes, no masses, urethra midline without lesions  Vagina: Moist, pale thin mucosa, bladder nontender, normal appearing discharge, minimal brown discharge on glove, no lesions, no active bleeding noted, short perineal body, vaginal cuff intact, cervix and uterus clinically absent    Recent Labs:  Recent Results (from the past 24 hour(s))   HGB & HCT    Collection Time: 11/20/21  2:59 AM   Result Value Ref Range    HGB 8.2 (L) 11.7 - 15.4 g/dL    HCT 24.6 (L) 35.8 - 46.3 %       Recent Imaging Studies:   3:09 PM [id number]  3:09 PM       Study Result    Narrative & Impression   KUB     CLINICAL INDICATION: Constipation for one week with blood in stools     FINDINGS: Two supine views of the abdomen and pelvis submitted. Air and stool  noted throughout the colon which is nondilated. There is stool over the rectum. No dilated loops of bowel present to indicate obstruction. Degenerative  spondylosis is noted of the lumbar spine. No abnormal calculi present. The  patient has bilateral hip hardware.     IMPRESSION  No acute abnormality. Constipation could be considered. Impression:  81 y/o CF with rectal bleeding, concern for VB-  No Gyn source of significant VB identified, given prior hysterectomy and no current vaginal or vulvar lesion, highly unlikely that this is the source of her current bleeding or anemia. We are available as needed but will sign off for now.            Michael Dumas MD

## 2021-11-20 NOTE — PROGRESS NOTES
Problem: Mobility Impaired (Adult and Pediatric)  Goal: *Acute Goals and Plan of Care (Insert Text)  Outcome: Progressing Towards Goal  Note:   LTG:  (1.)Ms. Nora Muse will move from sit to supine  in bed with STAND BY ASSIST within 5 treatment day(s). (2.)Ms. Nora Muse will transfer from bed to chair and chair to bed with MODERATE ASSIST using the least restrictive device within 5 treatment day(s). ________________________________________________________________________________________________       PHYSICAL THERAPY: Initial Assessment and PM 11/20/2021  INPATIENT: PT Visit Days : 1  [place] / Product Type: Medicare /       NAME/AGE/GENDER: Cora Solorzano is a 80 y.o. female   PRIMARY DIAGNOSIS: Lower GI bleed [K92.2]  Acute blood loss anemia [D62] Acute blood loss anemia Acute blood loss anemia        ICD-10: Treatment Diagnosis:    Generalized Muscle Weakness (M62.81)  Abnormal posture (R29.3)   Precaution/Allergies:  Patient has no known allergies. ASSESSMENT:     Ms. oNra Muse presents with above diagnoses. Patient demonstrates weakness throughout affecting her balance and mobility. She uses a wheelchair for mobility and has assist for all transfers. Patient would benefit from therapy to ensure ability to continue to transfer at home. She has assist from her grandson and his spouse and has homecare services. Patient should return home at d/c with resumption of services. Patient is upset about her current living situation and current medical situation. She feels she is a burden on her family and she feels ready to go to heaven. Comforted patient and asked SW to put in a  consult.       This section established at most recent assessment   PROBLEM LIST (Impairments causing functional limitations):  Decreased Strength  Decreased ADL/Functional Activities  Decreased Transfer Abilities  Decreased Balance   INTERVENTIONS PLANNED: (Benefits and precautions of physical therapy have been discussed with the patient.)  Balance Exercise  Bed Mobility  Family Education  Therapeutic Activites  Therapeutic Exercise/Strengthening  Transfer Training     TREATMENT PLAN: Frequency/Duration: daily for duration of hospital stay  Rehabilitation Potential For Stated Goals: Good     REHAB RECOMMENDATIONS (at time of discharge pending progress):    Placement: It is my opinion, based on this patient's performance to date, that Ms. Merlyn Painting may benefit from [address] after discharge due to the functional deficits listed above that are likely to improve with skilled rehabilitation because he/she has multiple medical issues that affect his/her functional mobility in the community. Equipment:   None at this time              HISTORY:   History of Present Injury/Illness (Reason for Referral):  Shari Villalobos is a 80 y.o. female with medical history of hemorrhoids, coronary artery disease, hip fracture, osteoporosis, chronic hyponatremia, chronic cystitis, hypertension who presents to the emergency department after being evaluated at [facility] emergency center for the patient was complaining of constant need for having to have a bowel movement and when she does this she has no evacuation of feces and has bright red blood. Apparently at [facility] emergency department emergency room physician attempted to have the patient passed a bowel movement indeed did not have any fecal episode but did have a large bright red blood per rectum episode. Subsequently the patient was sent to our emergency department for further evaluation and treatment.   At the time of my evaluation of the patient patient has become significantly agitated while she is oriented to person place and time as well as being able to tell me that she has been constipated for a week but has only been able to pass bright red blood she cannot comprehend why she has to be admitted to the hospital and she further is tearful and a poor historian and she is very agitated. Much of the information obtained in my history and physical was obtained from prior medical records and outside medical records. Course of action in the emergency department-KUB was obtained that demonstrated no acute process. H&H was obtained that demonstrated hemoglobin 9.6 hematocrit of 28.9.. BMP demonstrated a serum sodium of 134 potassium 3.8 chloride of 96 CO2 of 28 anion gap of 10 BUN of 17 creatinine of 0.7. Outside medical records were reviewed and patient has a baseline hemoglobin approximately 11. Secondary to 3 g drop in patient's hemoglobin hematocrit the decision was made to admit the patient for further evaluation and treatment. Past Medical History/Comorbidities:   Ms. Tao Newby  has no past medical history on file. Ms. Tao Newby  has no past surgical history on file. Social History/Living Environment:   Home Environment: Private residence  One/Two Story Residence: One story  Living Alone: No  Support Systems: Other Family Member(s)  Patient Expects to be Discharged to[de-identified] House  Current DME Used/Available at Home: Wheelchair  Prior Level of Function/Work/Activity:  Wheelchair for mobility and assist for transfers     Number of Personal Factors/Comorbidities that affect the Plan of Care: 1-2: MODERATE COMPLEXITY   EXAMINATION:   Most Recent Physical Functioning:   Gross Assessment:  AROM: Generally decreased, functional  Strength: Generally decreased, functional  Coordination: Generally decreased, functional               Posture:  Posture (WDL): Exceptions to WDL  Posture Assessment: Forward head, Kyphosis, Rounded shoulders  Balance:  Sitting: Impaired  Sitting - Static: Fair (occasional)  Sitting - Dynamic: Fair (occasional)  Standing: Impaired  Standing - Static: Constant support  Standing - Dynamic : Constant support Bed Mobility:  Supine to Sit: Stand-by assistance;  Additional time (head of bed elevated)  Sit to Supine: Moderate assistance; Maximum assistance  Wheelchair Mobility:     Transfers:  Sit to Stand: Moderate assistance; Assist x2  Stand to Sit: Moderate assistance; Assist x2  Gait:            Body Structures Involved:  Muscles Body Functions Affected: Movement Related Activities and Participation Affected: Mobility   Number of elements that affect the Plan of Care: 3: MODERATE COMPLEXITY   CLINICAL PRESENTATION:   Presentation: Stable and uncomplicated: LOW COMPLEXITY   CLINICAL DECISION MAKING:   [facility] AM-PAC 6 Clicks   Basic Mobility Inpatient Short Form  How much difficulty does the patient currently have. .. Unable A Lot A Little None   1. Turning over in bed (including adjusting bedclothes, sheets and blankets)? [] 1   [] 2   [x] 3   [] 4   2. Sitting down on and standing up from a chair with arms ( e.g., wheelchair, bedside commode, etc.)   [] 1   [x] 2   [] 3   [] 4   3. Moving from lying on back to sitting on the side of the bed? [] 1   [] 2   [x] 3   [] 4   How much help from another person does the patient currently need. .. Total A Lot A Little None   4. Moving to and from a bed to a chair (including a wheelchair)? [] 1   [x] 2   [] 3   [] 4   5. Need to walk in hospital room? [x] 1   [] 2   [] 3   [] 4   6. Climbing 3-5 steps with a railing? [x] 1   [] 2   [] 3   [] 4   © 2007, Trustees of [facility], under license to [employer].. All rights reserved      Score:  Initial: 12 Most Recent: X (Date: -- )    Interpretation of Tool:  Represents activities that are increasingly more difficult (i.e. Bed mobility, Transfers, Gait). Medical Necessity:     Patient is expected to demonstrate progress in   strength, balance, and coordination   to   decrease assistance required with bed mobility and transfers. .  Reason for Services/Other Comments:  Patient continues to require skilled intervention due to   Weakness limiting mobility.   .   Use of outcome tool(s) and clinical judgement create a POC that gives a: Clear prediction of patient's progress: LOW COMPLEXITY            TREATMENT:   (In addition to Assessment/Re-Assessment sessions the following treatments were rendered)   Pre-treatment Symptoms/Complaints:  Patient agreeable. Pain: Initial:   Pain Intensity 1: 0  Post Session:  0     Therapeutic Activity: (    10 minutes): Therapeutic activities including repeated sit <> stand transitions and standing balance with and without walker to improve mobility, strength, balance, and coordination. Required minimal  verbal cues  to promote static and dynamic balance in standing and promote coordination of bilateral, lower extremity(s). assessment    Braces/Orthotics/Lines/Etc:   O2 Device: None (Room air)  Treatment/Session Assessment:    Response to Treatment:  pleasant and cooperative  Interdisciplinary Collaboration:   Physical Therapist  Occupational Therapist  Registered Nurse    After treatment position/precautions:   Supine in bed  Bed/Chair-wheels locked  Call light within reach   Compliance with Program/Exercises: Will assess as treatment progresses  Recommendations/Intent for next treatment session: \"Next visit will focus on advancements to more challenging activities and reduction in assistance provided\".   Total Treatment Duration:  PT Patient Time In/Time Out  Time In: 0669  Time Out: [address] [facility],

## 2021-11-20 NOTE — PROGRESS NOTES
SW spoke with PT who is recommending home with [place] resumed. Patient expressed to PT feelings of sadness in relation to her age and health, lacks spiritual care and support. Referral to .      Juanis Bautista, JAIME    [facility]    * [email or web address]

## 2021-11-20 NOTE — PROGRESS NOTES
Problem: Self Care Deficits Care Plan (Adult)  Goal: *Acute Goals and Plan of Care (Insert Text)  Outcome: Progressing Towards Goal  Note:   1. Patient will complete grooming with contact guard assist to increase self care independence. 2. Patient will improve static sitting and dynamic sitting at edge of bed for 15 minutes to improve independence with transfers and self cares. 3. Patient will complete toilet transfer with assist of 1 person using adaptive equipment as needed. 4. Patient will complete chair transfer with minimal assist using adaptive equipment as needed. Timeframe: 7 visits       OCCUPATIONAL THERAPY: Initial Assessment, Daily Note, and PM 11/20/2021  INPATIENT: OT Visit Days: 1  [place] / Product Type: Medicare /      NAME/AGE/GENDER: Bassam Ames is a 80 y.o. female   PRIMARY DIAGNOSIS:  Lower GI bleed [K92.2]  Acute blood loss anemia [D62] Acute blood loss anemia Acute blood loss anemia        ICD-10: Treatment Diagnosis:    · Generalized Muscle Weakness (M62.81)  · Other lack of cordination (R27.8)  · Difficulty in walking, Not elsewhere classified (R26.2)   Precautions/Allergies:   Patient has no known allergies. ASSESSMENT:     Ms. Milan Morocho presents with the above diagnosis. At baseline, she lives with her grandson and his family. She requires assistance for all ADLs and transfers to [facility] for mobility. Today, she is agreeable to OT and PT evaluation. She sat up on the EOB with assistance and performed sit<>stands. She required assistance with donning shoes, combing hair, and setup to apply lip balm. She has damage to R shoulder and a forward flexed posture. Her hands are very arthritic. Pt reports she feels \"ready to go\" and has been praying to the [place]. Therapists comforted patient. She is functioning below her baseline and will benefit from continued OT during hospital stay.  OT recommends home with continued care from caregivers at discharge. This section established at most recent assessment   PROBLEM LIST (Impairments causing functional limitations):  1. Decreased Strength  2. Decreased ADL/Functional Activities  3. Decreased Transfer Abilities  4. Decreased Activity Tolerance   INTERVENTIONS PLANNED: (Benefits and precautions of occupational therapy have been discussed with the patient.)  1. Activities of daily living training  2. Adaptive equipment training  3. Balance training  4. Therapeutic activity  5. Therapeutic exercise     TREATMENT PLAN: Frequency/Duration: Follow patient 3x/week to address above goals. Rehabilitation Potential For Stated Goals: Good     REHAB RECOMMENDATIONS (at time of discharge pending progress):    Placement: It is my opinion, based on this patient's performance to date, that Ms. Merlyn Painting may benefit from [address] after discharge due to the functional deficits listed above that are likely to improve with skilled rehabilitation because he/she has multiple medical issues that affect his/her functional mobility in the community. Equipment:    None at this time              OCCUPATIONAL PROFILE AND HISTORY:   History of Present Injury/Illness (Reason for Referral):  See H&P  Past Medical History/Comorbidities:   Ms. Merlyn Paniting  has no past medical history on file. Ms. Merlyn Painting  has no past surgical history on file.   Social History/Living Environment:   Home Environment: Private residence  One/Two Story Residence: One story  Living Alone: No  Support Systems: Other Family Member(s)  Patient Expects to be Discharged to[de-identified] House  Current DME Used/Available at Home: Wheelchair  Prior Level of Function/Work/Activity:  Lives with grandson, assistance with all ADLs and mobility     Number of Personal Factors/Comorbidities that affect the Plan of Care: Brief history (0):  LOW COMPLEXITY   ASSESSMENT OF OCCUPATIONAL PERFORMANCE[de-identified]   Activities of Daily Living:   Basic ADLs (From Assessment) Complex ADLs (From Assessment)   Feeding: Minimum assistance  Oral Facial Hygiene/Grooming: Minimum assistance  Bathing: Maximum assistance  Upper Body Dressing: Moderate assistance  Lower Body Dressing: Total assistance  Toileting: Total assistance     Grooming/Bathing/Dressing Activities of Daily Living     Cognitive Retraining  Safety/Judgement: Awareness of environment                       Bed/Mat Mobility  Supine to Sit: Stand-by assistance; Additional time (head of bed elevated)  Sit to Supine: Moderate assistance; Maximum assistance  Sit to Stand: Moderate assistance; Assist x2  Stand to Sit: Moderate assistance; Assist x2     Most Recent Physical Functioning:   Gross Assessment:                  Posture:  Posture (WDL): Exceptions to WDL  Posture Assessment: Forward head, Kyphosis, Rounded shoulders  Balance:  Sitting: Impaired  Sitting - Static: Fair (occasional)  Sitting - Dynamic: Fair (occasional)  Standing: Impaired  Standing - Static: Constant support  Standing - Dynamic : Constant support Bed Mobility:  Supine to Sit: Stand-by assistance; Additional time (head of bed elevated)  Sit to Supine: Moderate assistance; Maximum assistance  Wheelchair Mobility:     Transfers:  Sit to Stand: Moderate assistance; Assist x2  Stand to Sit: Moderate assistance; Assist x2            Patient Vitals for the past 6 hrs:   BP SpO2 Pulse   11/20/21 1046 104/62 97 % 68   11/20/21 1506 (!) 99/54 96 % 65       Mental Status  Neurologic State: Alert  Orientation Level: Oriented X4  Cognition: Follows commands  Perception: Appears intact  Perseveration: No perseveration noted  Safety/Judgement: Awareness of environment                          Physical Skills Involved:  1. Range of Motion  2. Balance  3. Strength  4. Activity Tolerance Cognitive Skills Affected (resulting in the inability to perform in a timely and safe manner):  1. [facility] Psychosocial Skills Affected:  1.  N/a   Number of elements that affect the Plan of Care: 1-3:  LOW COMPLEXITY   CLINICAL DECISION MAKING:   [facility] AM-PAC 6 Clicks   Daily Activity Inpatient Short Form  How much help from another person does the patient currently need. .. Total A Lot A Little None   1. Putting on and taking off regular lower body clothing? [x] 1   [] 2   [] 3   [] 4   2. Bathing (including washing, rinsing, drying)? [x] 1   [] 2   [] 3   [] 4   3. Toileting, which includes using toilet, bedpan or urinal?   [x] 1   [] 2   [] 3   [] 4   4. Putting on and taking off regular upper body clothing? [] 1   [x] 2   [] 3   [] 4   5. Taking care of personal grooming such as brushing teeth? [] 1   [] 2   [x] 3   [] 4   6. Eating meals? [] 1   [] 2   [x] 3   [] 4   © 2007, Trustees of [facility] [employer], under license to [employer]. All rights reserved      Score:  Initial: 11 Most Recent: X (Date: -- )    Interpretation of Tool:  Represents activities that are increasingly more difficult (i.e. Bed mobility, Transfers, Gait). Medical Necessity:     · Skilled intervention continues to be required due to the above deficits. Reason for Services/Other Comments:  · See Above deficits. Use of outcome tool(s) and clinical judgement create a POC that gives a: LOW COMPLEXITY         TREATMENT:   (In addition to Assessment/Re-Assessment sessions the following treatments were rendered)     Pre-treatment Symptoms/Complaints:    Pain: Initial:   Pain Intensity 1: 0  Post Session:  0     Self Care: (15): Procedure(s) (per grid) utilized to improve and/or restore self-care/home management as related to dressing and Functional mobility. Required moderate verbal and tactile cueing to facilitate activities of daily living skills and compensatory activities.   Evaluation complete    Braces/Orthotics/Lines/Etc:   · purewick  · O2 Device: None (Room air)  Treatment/Session Assessment:    · Response to Treatment:  Good, supine in bed  · Interdisciplinary Collaboration:   o Physical Therapist  o Occupational Therapist  o Registered Nurse  · After treatment position/precautions:   o Supine in bed  o Bed/Chair-wheels locked  o Bed in low position  o Call light within reach  o RN notified   · Compliance with Program/Exercises: Will assess as treatment progresses. · Recommendations/Intent for next treatment session: \"Next visit will focus on advancements to more challenging activities and reduction in assistance provided\".   Total Treatment Duration:  OT Patient Time In/Time Out  Time In: 1415  Time Out: [address], [place]

## 2021-11-20 NOTE — PROGRESS NOTES
Hospitalist Progress Note   Admit Date:  2021  2:04 PM   Name:  Dina Hurtado   Age:  80 y.o. Sex:  female  :  1926   MRN:  [id number]   Room:  [facility]/01    Presenting Complaint: Constipation    Reason(s) for Admission: Lower GI bleed [K92.2]  Acute blood loss anemia [D62]     Hospital Course & Interval History:   Mrs. Yahir Aparicio is a very nice 79 y/o WF with a h/o CAD, HTN, chronic hyponatremia who was admitted to our service on  with acute blood loss anemia and hematochezia. Hb 1 week prior to admission was in the 10s; 3 months ago was high 11. On presentation it was 9.7. She describes nothing but blood during each BM. She was admitted and started on IVFs, PPI, serial H/H GI consult. Suppository for severe constipation. No endoscopy per patient and family. Subjective (21):  Nursing has noted a fair amount of vaginal bleeding with her attempts to use the bedside commode. Her Hb is normal. She feels a little weak, has some polyuria and said she was recently dx with a UTI but never got her abx. No N/V/D, chest pain or SOB. Assessment & Plan:   # Acute blood loss anemia 2/2 lower GI/vaginal bleeding              - Hb stable in the 8s. Nursing recently noted a good amount of vaginal bleeding rather than BRBPR. No endoscopy per GI d/w family and patient. Consult GYN for recs on vaginal bleeding. # Polyuria   - Check UA    # HTN              - Home meds     # Hypothyroidism              - Synthroid     # MDD/anxiety              - Lexapro, low dose Ativan    Dispo/Discharge Planning: Pending, PT/OT evals today. Diet:  ADULT DIET Dysphagia - Soft & Bite Sized; Low Fat/Low Chol/High Fiber/LILLI; No Red Dye  DVT PPx: SCDs only.   Code status: Full Code    Hospital Problems as of 2021 Date Reviewed: 2021          Codes Class Noted - Resolved POA    * (Principal) Acute blood loss anemia ICD-10-CM: D62  ICD-9-CM: 285.1  2021 - Present Yes        MDD (major depressive disorder) ICD-10-CM: F32.9  ICD-9-CM: 296.20  11/18/2021 - Present Yes        Anxiety ICD-10-CM: F41.9  ICD-9-CM: 300.00  11/18/2021 - Present Yes        Hypertension ICD-10-CM: I10  ICD-9-CM: 401.9  11/18/2021 - Present Yes        Acquired hypothyroidism ICD-10-CM: E03.9  ICD-9-CM: 244.9  11/18/2021 - Present Yes        Lower GI bleed ICD-10-CM: K92.2  ICD-9-CM: 578.9  11/17/2021 - Present Yes              Objective:     Patient Vitals for the past 24 hrs:   Temp Pulse Resp BP SpO2   11/20/21 0743 98.2 °F (36.8 °C) 63 18 129/73 97 %   11/20/21 0253 98.2 °F (36.8 °C) 70  110/63 98 %   11/20/21 0018 98 °F (36.7 °C) 72 18 103/61 93 %   11/19/21 2051 98 °F (36.7 °C) 72 17 129/70 99 %   11/19/21 1539 98.2 °F (36.8 °C) 77 18 125/72 98 %   11/19/21 1129 97.7 °F (36.5 °C) 98 17 (!) 112/54 98 %     Oxygen Therapy  O2 Sat (%): 97 % (11/20/21 0743)  Pulse via Oximetry: 67 beats per minute (11/19/21 0745)  O2 Device: None (Room air) (11/20/21 0310)    There is no height or weight on file to calculate BMI. Intake/Output Summary (Last 24 hours) at 11/20/2021 1116  Last data filed at 11/20/2021 0310  Gross per 24 hour   Intake 355 ml   Output 800 ml   Net -445 ml         Physical Exam:   Blood pressure 129/73, pulse 63, temperature 98.2 °F (36.8 °C), resp. rate 18, SpO2 97 %. General:    Well nourished. No overt distress. Appears stated age. Somewhat weak appearing. Head:  Normocephalic, atraumatic  Eyes:  Sclerae appear normal.  Pupils equally round. ENT:  Nares appear normal, no drainage. Moist oral mucosa  Neck:  No restricted ROM. Trachea midline   CV:   RRR. No m/r/g. No jugular venous distension. Lungs:   CTAB. No wheezing, rhonchi, or rales. Respirations even, unlabored. Abdomen: Bowel sounds present. Soft, nontender, nondistended. Extremities: No cyanosis or clubbing. No edema  Skin:     No rashes and normal coloration. Warm and dry. Neuro:  CN II-XII grossly intact. Sensation intact. A&Ox3  Psych:  Normal mood and affect. I have reviewed ordered lab tests and independently visualized imaging below:    Recent Labs:  Recent Results (from the past 48 hour(s))   HGB & HCT    Collection Time: 11/18/21 12:47 PM   Result Value Ref Range    HGB 8.8 (L) 11.7 - 15.4 g/dL    HCT 26.2 (L) 35.8 - 46.3 %   CBC WITH AUTOMATED DIFF    Collection Time: 11/19/21  5:49 AM   Result Value Ref Range    WBC 6.6 4.3 - 11.1 K/uL    RBC 2.69 (L) 4.05 - 5.2 M/uL    HGB 8.6 (L) 11.7 - 15.4 g/dL    HCT 25.9 (L) 35.8 - 46.3 %    MCV 96.3 79.6 - 97.8 FL    MCH 32.0 26.1 - 32.9 PG    MCHC 33.2 31.4 - 35.0 g/dL    RDW 14.6 11.9 - 14.6 %    PLATELET 653 091 - 982 K/uL    MPV 8.9 (L) 9.4 - 12.3 FL    ABSOLUTE NRBC 0.00 0.0 - 0.2 K/uL    DF AUTOMATED      NEUTROPHILS 81 (H) 43 - 78 %    LYMPHOCYTES 13 13 - 44 %    MONOCYTES 5 4.0 - 12.0 %    EOSINOPHILS 0 (L) 0.5 - 7.8 %    BASOPHILS 1 0.0 - 2.0 %    IMMATURE GRANULOCYTES 1 0.0 - 5.0 %    ABS. NEUTROPHILS 5.4 1.7 - 8.2 K/UL    ABS. LYMPHOCYTES 0.8 0.5 - 4.6 K/UL    ABS. MONOCYTES 0.4 0.1 - 1.3 K/UL    ABS. EOSINOPHILS 0.0 0.0 - 0.8 K/UL    ABS. BASOPHILS 0.0 0.0 - 0.2 K/UL    ABS. IMM. GRANS. 0.0 0.0 - 0.5 K/UL   HGB & HCT    Collection Time: 11/20/21  2:59 AM   Result Value Ref Range    HGB 8.2 (L) 11.7 - 15.4 g/dL    HCT 24.6 (L) 35.8 - 46.3 %       All Micro Results     Procedure Component Value Units Date/Time    COVID-19 RAPID TEST [[id number]] Collected: 11/17/21 1930    Order Status: Completed Specimen: Nasopharyngeal Updated: 11/17/21 2011     Specimen source NASAL SWAB        COVID-19 rapid test Not detected        Comment:      The specimen is NEGATIVE for SARS-CoV-2, the novel coronavirus associated with COVID-19. A negative result does not rule out COVID-19. This test has been authorized by the FDA under an Emergency Use Authorization (EUA) for use by authorized laboratories.         Fact sheet for Healthcare Providers: ConventionUpdate.co.nz  Fact sheet for Patients: ConventionUpdate.co.nz       Methodology: Isothermal Nucleic Acid Amplification               Other Studies:  No results found. Current Meds:  Current Facility-Administered Medications   Medication Dose Route Frequency    sodium chloride tablet 1 g  1 g Oral BID WITH MEALS    carboxymethylcellulose sodium (REFRESH LIQUIGEL) 1 % ophthalmic solution 1 Drop  1 Drop Both Eyes PRN    bisacodyL (DULCOLAX) suppository 10 mg  10 mg Rectal DAILY PRN    lip protectant (BLISTEX) ointment 1 Each  1 Each Topical PRN    hydrocortisone (ANUSOL-HC) suppository 25 mg  25 mg Rectal Q12H    polyethylene glycol (MIRALAX) packet 17 g  17 g Oral BID    sodium chloride (NS) flush 5-40 mL  5-40 mL IntraVENous Q8H    sodium chloride (NS) flush 5-40 mL  5-40 mL IntraVENous PRN    ondansetron (ZOFRAN ODT) tablet 4 mg  4 mg Oral Q8H PRN    Or    ondansetron (ZOFRAN) injection 4 mg  4 mg IntraVENous Q6H PRN    bisacodyL (DULCOLAX) tablet 5 mg  5 mg Oral DAILY PRN    pantoprazole (PROTONIX) 40 mg in 0.9% sodium chloride 10 mL injection  40 mg IntraVENous Q12H    acetaminophen (TYLENOL) tablet 650 mg  650 mg Oral Q6H PRN    Or    acetaminophen (TYLENOL) suppository 650 mg  650 mg Rectal Q6H PRN    amLODIPine (NORVASC) tablet 2.5 mg  2.5 mg Oral Q12H    escitalopram oxalate (LEXAPRO) tablet 20 mg  20 mg Oral DAILY    diclofenac (VOLTAREN) 1 % topical gel 2 g  2 g Topical QID    levothyroxine (SYNTHROID) tablet 75 mcg  75 mcg Oral ACB    LORazepam (ATIVAN) tablet 0.25 mg  0.25 mg Oral BID    promethazine (PHENERGAN) tablet 12.5 mg  12.5 mg Oral QHS PRN    traMADoL (ULTRAM) tablet 50 mg  50 mg Oral Q6H PRN       Signed:  Parris Maher MD    Part of this note may have been written by using a voice dictation software. The note has been proof read but may still contain some grammatical/other typographical errors.

## 2021-11-20 NOTE — PROGRESS NOTES
GI Brief Note    We will sign off. Please call us with any further questions or concerns. Will arrange for outpatient follow up with the patient in 4-6 weeks.     Hien Schaefer MD   [facility]

## 2021-11-20 NOTE — PROGRESS NOTES
END OF SHIFT NOTE:    Intake/Output  11/20 0701 - 11/20 1900  In: 480 [P.O.:480]  Out: 600 [Urine:600]   Voiding: YES  Catheter: NO  Drain:   External Urinary Catheter 11/19/21 (Active)   Site Assessment Clean, dry, & intact 11/20/21 1419   Repositioned Yes 11/20/21 1419   Perineal Care No 11/20/21 1419   Wick Changed No 11/20/21 1419   Suction Canister/Tubing Changed No 11/20/21 1419   Urine Output (mL) 600 ml 11/20/21 1214               Stool:  0 occurrences. Stool Assessment  Stool Color: [place] Le[place]; Other (Comment) (blood) (11/18/21 1555)  Stool Appearance: Bloody (per previous documentation, this RN not visualized) (11/19/21 2025)  Stool Amount: Large (11/18/21 1555)    Emesis:  0 occurrences. VITAL SIGNS  Patient Vitals for the past 12 hrs:   Temp Pulse Resp BP SpO2   11/20/21 1506 98.4 °F (36.9 °C) 65  (!) 99/54 96 %   11/20/21 1046 98.2 °F (36.8 °C) 68 18 104/62 97 %   11/20/21 0743 98.2 °F (36.8 °C) 63 18 129/73 97 %       Pain Assessment  Pain 1  Pain Scale 1: Numeric (0 - 10) (11/20/21 1500)  Pain Intensity 1: 0 (11/20/21 1500)  Patient Stated Pain Goal: 0 (11/20/21 0737)  Pain Reassessment 1: Patient resting w/respiratory rate greater than 10 (11/20/21 0018)  Pain Onset 1: PTA (11/18/21 1950)  Pain Location 1: Leg (11/19/21 2340)  Pain Orientation 1: Lower (11/19/21 2340)  Pain Description 1: Aching; Constant (11/19/21 2340)  Pain Intervention(s) 1: Medication (see MAR) (11/19/21 2340)    Ambulating  No    Additional Information: Romain Hank noted to have scant dark blood on it while Dr. Manuelito Rosado present doing assessment on patient. No other bleeding noted by this RN. Shift report will be given to oncoming nurse at the bedside.     Rommel Bhat RN

## 2021-11-21 LAB
APPEARANCE UR: ABNORMAL
BACTERIA URNS QL MICRO: ABNORMAL /HPF
BILIRUB UR QL: NEGATIVE
CASTS URNS QL MICRO: ABNORMAL /LPF
COLOR UR: YELLOW
EPI CELLS #/AREA URNS HPF: ABNORMAL /HPF
GLUCOSE UR STRIP.AUTO-MCNC: NEGATIVE MG/DL
HCT VFR BLD AUTO: 24.3 % (ref 35.8–46.3)
HGB BLD-MCNC: 8.2 G/DL (ref 11.7–15.4)
HGB UR QL STRIP: ABNORMAL
KETONES UR QL STRIP.AUTO: NEGATIVE MG/DL
LEUKOCYTE ESTERASE UR QL STRIP.AUTO: ABNORMAL
NITRITE UR QL STRIP.AUTO: POSITIVE
PH UR STRIP: 6 [PH] (ref 5–9)
PROT UR STRIP-MCNC: NEGATIVE MG/DL
RBC #/AREA URNS HPF: ABNORMAL /HPF
SP GR UR REFRACTOMETRY: 1.01 (ref 1–1.02)
UROBILINOGEN UR QL STRIP.AUTO: 0.2 EU/DL (ref 0.2–1)
WBC URNS QL MICRO: ABNORMAL /HPF

## 2021-11-21 PROCEDURE — 97530 THERAPEUTIC ACTIVITIES: CPT

## 2021-11-21 PROCEDURE — 74011250637 HC RX REV CODE- 250/637: Performed by: INTERNAL MEDICINE

## 2021-11-21 PROCEDURE — 87186 SC STD MICRODIL/AGAR DIL: CPT

## 2021-11-21 PROCEDURE — 85018 HEMOGLOBIN: CPT

## 2021-11-21 PROCEDURE — 81001 URINALYSIS AUTO W/SCOPE: CPT

## 2021-11-21 PROCEDURE — 65270000029 HC RM PRIVATE

## 2021-11-21 PROCEDURE — 87086 URINE CULTURE/COLONY COUNT: CPT

## 2021-11-21 PROCEDURE — 74011250636 HC RX REV CODE- 250/636: Performed by: INTERNAL MEDICINE

## 2021-11-21 PROCEDURE — 36415 COLL VENOUS BLD VENIPUNCTURE: CPT

## 2021-11-21 PROCEDURE — 74011000258 HC RX REV CODE- 258: Performed by: INTERNAL MEDICINE

## 2021-11-21 PROCEDURE — 87088 URINE BACTERIA CULTURE: CPT

## 2021-11-21 PROCEDURE — 74011250637 HC RX REV CODE- 250/637: Performed by: FAMILY MEDICINE

## 2021-11-21 RX ORDER — LORAZEPAM 0.5 MG/1
0.25 TABLET ORAL ONCE
Status: COMPLETED | OUTPATIENT
Start: 2021-11-21 | End: 2021-11-21

## 2021-11-21 RX ADMIN — DICLOFENAC SODIUM 2 G: 10 GEL TOPICAL at 17:18

## 2021-11-21 RX ADMIN — AMLODIPINE BESYLATE 2.5 MG: 5 TABLET ORAL at 20:38

## 2021-11-21 RX ADMIN — POLYETHYLENE GLYCOL 3350 17 G: 17 POWDER, FOR SOLUTION ORAL at 17:17

## 2021-11-21 RX ADMIN — DICLOFENAC SODIUM 2 G: 10 GEL TOPICAL at 22:00

## 2021-11-21 RX ADMIN — TRAMADOL HYDROCHLORIDE 50 MG: 50 TABLET, COATED ORAL at 17:24

## 2021-11-21 RX ADMIN — HYDROCORTISONE ACETATE 25 MG: 25 SUPPOSITORY RECTAL at 20:39

## 2021-11-21 RX ADMIN — LORAZEPAM 0.25 MG: 0.5 TABLET ORAL at 09:06

## 2021-11-21 RX ADMIN — Medication 10 ML: at 06:04

## 2021-11-21 RX ADMIN — HYDROCORTISONE ACETATE 25 MG: 25 SUPPOSITORY RECTAL at 09:06

## 2021-11-21 RX ADMIN — LEVOTHYROXINE SODIUM 75 MCG: 75 TABLET ORAL at 06:04

## 2021-11-21 RX ADMIN — Medication 10 ML: at 21:45

## 2021-11-21 RX ADMIN — Medication 1 G: at 09:05

## 2021-11-21 RX ADMIN — ESCITALOPRAM OXALATE 20 MG: 10 TABLET ORAL at 09:06

## 2021-11-21 RX ADMIN — DICLOFENAC SODIUM 2 G: 10 GEL TOPICAL at 12:10

## 2021-11-21 RX ADMIN — Medication 1 G: at 17:17

## 2021-11-21 RX ADMIN — CEFTRIAXONE 1 G: 1 INJECTION, POWDER, FOR SOLUTION INTRAMUSCULAR; INTRAVENOUS at 13:21

## 2021-11-21 RX ADMIN — AMLODIPINE BESYLATE 2.5 MG: 5 TABLET ORAL at 09:05

## 2021-11-21 RX ADMIN — DICLOFENAC SODIUM 2 G: 10 GEL TOPICAL at 09:07

## 2021-11-21 RX ADMIN — LORAZEPAM 0.25 MG: 0.5 TABLET ORAL at 17:17

## 2021-11-21 RX ADMIN — LORAZEPAM 0.25 MG: 0.5 TABLET ORAL at 21:44

## 2021-11-21 RX ADMIN — POLYETHYLENE GLYCOL 3350 17 G: 17 POWDER, FOR SOLUTION ORAL at 09:06

## 2021-11-21 RX ADMIN — Medication 10 ML: at 14:16

## 2021-11-21 NOTE — PROGRESS NOTES
Problem: Mobility Impaired (Adult and Pediatric)  Goal: *Acute Goals and Plan of Care (Insert Text)  Outcome: Progressing Towards Goal  Note:   LTG:  (1.)Ms. Mena Ayala will move from sit to supine  in bed with STAND BY ASSIST within 5 treatment day(s). (2.)Ms. Mena Ayala will transfer from bed to chair and chair to bed with MODERATE ASSIST using the least restrictive device within 5 treatment day(s). ________________________________________________________________________________________________       PHYSICAL THERAPY: Daily Note and AM 11/21/2021  INPATIENT: PT Visit Days : 2  [place] / [place] / Product Type: Medicare /       NAME/AGE/GENDER: Fuentes Joaquin is a 80 y.o. female   PRIMARY DIAGNOSIS: Lower GI bleed [K92.2]  Acute blood loss anemia [D62] Acute blood loss anemia Acute blood loss anemia       ICD-10: Treatment Diagnosis:    · Generalized Muscle Weakness (M62.81)  · Abnormal posture (R29.3)   Precaution/Allergies:  Patient has no known allergies. ASSESSMENT:     Ms. Mena Ayala presents with above diagnoses. Patient demonstrates weakness throughout affecting her balance and mobility. She uses a wheelchair for mobility and has assist for all transfers. Patient would benefit from therapy to ensure ability to continue to transfer at home. She has assist from her grandson and his spouse and has homecare services. Patient should return home at d/c with resumption of services. Patient is upset about her current living situation and current medical situation. She feels she is a burden on her family and she feels ready to go to heaven. Comforted patient and asked SW to put in a  consult. Patient very upset that the doctor may send her home and she is still bleeding on the toilet. She was able to perform a stand pivot transfer between the bed and [facility] but required max A. She did have a good bit of blood in the toilet and on the wipes.   CNA provided hygiene and to discuss with RN. Continue to plan for patient to return home with family and homecare services. This section established at most recent assessment   PROBLEM LIST (Impairments causing functional limitations):  1. Decreased Strength  2. Decreased ADL/Functional Activities  3. Decreased Transfer Abilities  4. Decreased Balance   INTERVENTIONS PLANNED: (Benefits and precautions of physical therapy have been discussed with the patient.)  1. Balance Exercise  2. Bed Mobility  3. Family Education  4. Therapeutic Activites  5. Therapeutic Exercise/Strengthening  6. Transfer Training     TREATMENT PLAN: Frequency/Duration: daily for duration of hospital stay  Rehabilitation Potential For Stated Goals: Good     REHAB RECOMMENDATIONS (at time of discharge pending progress):    Placement: It is my opinion, based on this patient's performance to date, that Ms. Ben Garcia may benefit from [address] after discharge due to the functional deficits listed above that are likely to improve with skilled rehabilitation because he/she has multiple medical issues that affect his/her functional mobility in the community. Equipment:    None at this time              HISTORY:   History of Present Injury/Illness (Reason for Referral):  Gill Mooney is a 80 y.o. female with medical history of hemorrhoids, coronary artery disease, hip fracture, osteoporosis, chronic hyponatremia, chronic cystitis, hypertension who presents to the emergency department after being evaluated at [facility] emergency [facility] for the patient was complaining of constant need for having to have a bowel movement and when she does this she has no evacuation of feces and has bright red blood. Apparently at [facility] emergency department emergency room physician attempted to have the patient passed a bowel movement indeed did not have any fecal episode but did have a large bright red blood per rectum episode.   Subsequently the patient was sent to our emergency department for further evaluation and treatment. At the time of my evaluation of the patient patient has become significantly agitated while she is oriented to person place and time as well as being able to tell me that she has been constipated for a week but has only been able to pass bright red blood she cannot comprehend why she has to be admitted to the hospital and she further is tearful and a poor historian and she is very agitated. Much of the information obtained in my history and physical was obtained from prior medical records and outside medical records. Course of action in the emergency department-KUB was obtained that demonstrated no acute process. H&H was obtained that demonstrated hemoglobin 9.6 hematocrit of 28.9.. BMP demonstrated a serum sodium of 134 potassium 3.8 chloride of 96 CO2 of 28 anion gap of 10 BUN of 17 creatinine of 0.7. Outside medical records were reviewed and patient has a baseline hemoglobin approximately 11. Secondary to 3 g drop in patient's hemoglobin hematocrit the decision was made to admit the patient for further evaluation and treatment. Past Medical History/Comorbidities:   Ms. Ramiro Owen  has no past medical history on file. Ms. Ramiro Owen  has no past surgical history on file.   Social History/Living Environment:   Home Environment: Private residence  One/Two Story Residence: One story  Living Alone: No  Support Systems: Other Family Member(s)  Patient Expects to be Discharged to[de-identified] House  Current DME Used/Available at Home: Wheelchair  Prior Level of Function/Work/Activity:  Wheelchair for mobility and assist for transfers     Number of Personal Factors/Comorbidities that affect the Plan of Care: 1-2: MODERATE COMPLEXITY   EXAMINATION:   Most Recent Physical Functioning:   Gross Assessment:  AROM: Generally decreased, functional  Strength: Generally decreased, functional  Coordination: Generally decreased, functional Posture:  Posture (WDL): Exceptions to WDL  Posture Assessment: Forward head, Kyphosis, Rounded shoulders  Balance:  Sitting: Impaired  Sitting - Static: Fair (occasional)  Sitting - Dynamic: Fair (occasional)  Standing: Impaired  Standing - Static: Constant support  Standing - Dynamic : Constant support Bed Mobility:  Supine to Sit: Stand-by assistance; Additional time (head of bed elevated with bed rails)  Sit to Supine: Moderate assistance  Wheelchair Mobility:     Transfers:  Sit to Stand: Maximum assistance; Assist x1  Stand to Sit: Maximum assistance; Assist x1  Gait:            Body Structures Involved:  1. Muscles Body Functions Affected:  1. Movement Related Activities and Participation Affected:  1. Mobility   Number of elements that affect the Plan of Care: 3: MODERATE COMPLEXITY   CLINICAL PRESENTATION:   Presentation: Stable and uncomplicated: LOW COMPLEXITY   CLINICAL DECISION MAKIN[address] [place] [id number] AM-PAC 6 Clicks   Basic Mobility Inpatient Short Form  How much difficulty does the patient currently have. .. Unable A Lot A Little None   1. Turning over in bed (including adjusting bedclothes, sheets and blankets)? [] 1   [] 2   [x] 3   [] 4   2. Sitting down on and standing up from a chair with arms ( e.g., wheelchair, bedside commode, etc.)   [] 1   [x] 2   [] 3   [] 4   3. Moving from lying on back to sitting on the side of the bed? [] 1   [] 2   [x] 3   [] 4   How much help from another person does the patient currently need. .. Total A Lot A Little None   4. Moving to and from a bed to a chair (including a wheelchair)? [] 1   [x] 2   [] 3   [] 4   5. Need to walk in hospital room? [x] 1   [] 2   [] 3   [] 4   6. Climbing 3-5 steps with a railing? [x] 1   [] 2   [] 3   [] 4   © , Trustees of [address] [place] [id number], under license to [employer].  All rights reserved      Score:  Initial: 12 Most Recent: X (Date: -- )    Interpretation of Tool:  Represents activities that are increasingly more difficult (i.e. Bed mobility, Transfers, Gait). Medical Necessity:     · Patient is expected to demonstrate progress in   · strength, balance, and coordination  ·  to   · decrease assistance required with bed mobility and transfers. · .  Reason for Services/Other Comments:  · Patient continues to require skilled intervention due to   · Weakness limiting mobility. · .   Use of outcome tool(s) and clinical judgement create a POC that gives a: Clear prediction of patient's progress: LOW COMPLEXITY            TREATMENT:   (In addition to Assessment/Re-Assessment sessions the following treatments were rendered)   Pre-treatment Symptoms/Complaints:  Patient agreeable. Pain: Initial:   Pain Intensity 1: 0  Post Session:  0     Therapeutic Activity: (    25 minutes): Therapeutic activities including bed mobility, repeated sit <> stand transitions, standing balance without walker, and stand pivot transfers between bed and BSC to improve mobility, strength, balance, and coordination. Required moderate verbal and manual cues  to promote static and dynamic balance in standing and promote coordination of bilateral, lower extremity(s). Braces/Orthotics/Lines/Etc:   · O2 Device: None (Room air)  Treatment/Session Assessment:    · Response to Treatment:  Participated well but very anxious about d/c home with continued bleeding. No documentation indicating patient being discharged. · Interdisciplinary Collaboration:   o Physical Therapist  o Registered Nurse  o   o Certified Nursing Assistant/Patient Care Technician  · After treatment position/precautions:   o Supine in bed  o Bed/Chair-wheels locked  o Call light within reach   · Compliance with Program/Exercises: Will assess as treatment progresses  · Recommendations/Intent for next treatment session: \"Next visit will focus on advancements to more challenging activities and reduction in assistance provided\".   Total Treatment Duration:  PT Patient Time In/Time Out  Time In: 1015  Time Out: Levar Carlin PT

## 2021-11-21 NOTE — PROGRESS NOTES
Problem: Pressure Injury - Risk of  Goal: *Prevention of pressure injury  Description: Document Ralph Scale and appropriate interventions in the flowsheet.   Outcome: Progressing Towards Goal  Note: Pressure Injury Interventions:  Sensory Interventions: Assess need for specialty bed, Pressure redistribution bed/mattress (bed type)    Moisture Interventions: Absorbent underpads, Maintain skin hydration (lotion/cream), Limit adult briefs    Activity Interventions: Assess need for specialty bed, Increase time out of bed, Pressure redistribution bed/mattress(bed type)    Mobility Interventions: Assess need for specialty bed, HOB 30 degrees or less, Pressure redistribution bed/mattress (bed type)    Nutrition Interventions: Document food/fluid/supplement intake, Offer support with meals,snacks and hydration    Friction and Shear Interventions: HOB 30 degrees or less

## 2021-11-21 NOTE — PROGRESS NOTES
END OF SHIFT NOTE:    Intake/Output  11/21 0701 - 11/21 1900  In: 480 [P.O.:480]  Out: 475 [Urine:475]   Voiding: YES  Catheter: NO  Drain:   External Urinary Catheter 11/19/21 (Active)   Site Assessment Clean, dry, & intact 11/21/21 1435   Repositioned Yes 11/21/21 1435   Perineal Care Yes 11/21/21 1435   Wick Changed No 11/21/21 1435   Suction Canister/Tubing Changed No 11/21/21 1435   Urine Output (mL) 475 ml 11/21/21 1755               Stool:  1 occurrences. Stool Assessment  Stool Color: [place]; Other (Comment) (blood) (11/18/21 1555)  Stool Appearance: Bloody (per previous documentation, this RN not visualized) (11/19/21 2025)  Stool Amount: Large (11/18/21 1555)    Emesis:  0 occurrences. VITAL SIGNS  Patient Vitals for the past 12 hrs:   Temp Pulse Resp BP SpO2   11/21/21 1440 97.8 °F (36.6 °C) 72 18 (!) 107/58 97 %   11/21/21 1111 97.8 °F (36.6 °C) 75 16 (!) 103/55 99 %   11/21/21 0729 98 °F (36.7 °C) 70 20 125/65 96 %       Pain Assessment  Pain 1  Pain Scale 1: Numeric (0 - 10) (11/21/21 1724)  Pain Intensity 1: 6 (11/21/21 1724)  Patient Stated Pain Goal: 0 (11/21/21 1724)  Pain Reassessment 1: Yes (11/20/21 2009)  Pain Onset 1: PTA (11/18/21 1950)  Pain Location 1: Generalized (11/21/21 1724)  Pain Orientation 1: Lower (11/20/21 1930)  Pain Description 1: Aching (11/21/21 1724)  Pain Intervention(s) 1: Medication (see MAR) (11/21/21 1724)    Ambulating  No    Additional Information: Per tech pt had large BM and bright red bleeding noted, reported to MD, no new orders at this time. Vital signs are stable. Shift report will be given to oncoming nurse at the bedside.     Doug Watson RN

## 2021-11-21 NOTE — PROGRESS NOTES
Hospitalist Progress Note   Admit Date:  2021  2:04 PM   Name:  Annie Jack   Age:  80 y.o. Sex:  female  :  1926   MRN:  [id number]   Room:  [facility]/    Presenting Complaint: Constipation    Reason(s) for Admission: Lower GI bleed [K92.2]  Acute blood loss anemia [D62]     Hospital Course & Interval History:   Mrs. Stella Maguire is a very nice 81 y/o WF with a h/o CAD, HTN, chronic hyponatremia who was admitted to our service on  with acute blood loss anemia and hematochezia. Hb 1 week prior to admission was in the 10s; 3 months ago was high 11. On presentation it was 9.7. She describes nothing but blood during each BM. She was admitted and started on IVFs, PPI, serial H/H GI consult. Suppository for severe constipation. No endoscopy per patient and family. GYN consulted with concerns for vaginal bleeding but none was discovered, patient s/p hysterectomy so likely all rectal. Hb remains stable. Working with PT/OT well. Subjective (21): In bed, weak and tired, still worried about rectal bleeding. Hb is stable. Doing well with PT/OT who recommends HH. No chest pain, N/V/D. Per RN had BRBPR this afternoon. ROS neg otherwise. Assessment & Plan:   # Acute blood loss anemia 2/2 lower GI bleeding              - Hb stable in the 8s. GYN saw on  for suspected vaginal bleeding, however no lesions or bleeding noted and she is s/p hysterectomy. No endoscopy per GI d/w family and patient. Hb stable today. Had bloody BM today, repeat Hb again tomorrow.      # Acute cystitis              - Culture sent. Start Rocephin x3 days.     # HTN              - Home meds     # Hypothyroidism              - Synthroid     # MDD/anxiety              - Lexapro, low dose Ativan       Dispo/Discharge Planning: Home with HH, tomorrow? Diet:  ADULT DIET Dysphagia - Soft & Bite Sized;  Low Fat/Low Chol/High Fiber/LILLI; No Red Dye  DVT PPx: SCDs  Code status: Full Code    Hospital Problems as of 2021 Date Reviewed: 11/18/2021          Codes Class Noted - Resolved POA    * (Principal) Acute blood loss anemia ICD-10-CM: D62  ICD-9-CM: 285.1  11/18/2021 - Present Yes        MDD (major depressive disorder) ICD-10-CM: F32.9  ICD-9-CM: 296.20  11/18/2021 - Present Yes        Anxiety ICD-10-CM: F41.9  ICD-9-CM: 300.00  11/18/2021 - Present Yes        Hypertension ICD-10-CM: I10  ICD-9-CM: 401.9  11/18/2021 - Present Yes        Acquired hypothyroidism ICD-10-CM: E03.9  ICD-9-CM: 244.9  11/18/2021 - Present Yes        Lower GI bleed ICD-10-CM: K92.2  ICD-9-CM: 578.9  11/17/2021 - Present Yes              Objective:     Patient Vitals for the past 24 hrs:   Temp Pulse Resp BP SpO2   11/21/21 1111 97.8 °F (36.6 °C) 75 16 (!) 103/55 99 %   11/21/21 0729 98 °F (36.7 °C) 70 20 125/65 96 %   11/21/21 0249 98.1 °F (36.7 °C) 66 18 100/63 97 %   11/20/21 2313 98 °F (36.7 °C) 67 18 121/61 98 %   11/20/21 1943 98 °F (36.7 °C) 67 18 117/61 96 %   11/20/21 1506 98.4 °F (36.9 °C) 65  (!) 99/54 96 %     Oxygen Therapy  O2 Sat (%): 99 % (11/21/21 1111)  Pulse via Oximetry: 67 beats per minute (11/19/21 0745)  O2 Device: None (Room air) (11/21/21 0139)    There is no height or weight on file to calculate BMI. Intake/Output Summary (Last 24 hours) at 11/21/2021 1214  Last data filed at 11/21/2021 0930  Gross per 24 hour   Intake 600 ml   Output 400 ml   Net 200 ml         Physical Exam:   Blood pressure (!) 103/55, pulse 75, temperature 97.8 °F (36.6 °C), resp. rate 16, SpO2 99 %. General:    Well nourished. No overt distress  Head:  Normocephalic, atraumatic  Eyes:  Sclerae appear normal.  Pupils equally round. ENT:  Nares appear normal, no drainage. Moist oral mucosa  Neck:  No restricted ROM. Trachea midline   CV:   RRR. No m/r/g. No jugular venous distension. Lungs:   CTAB. No wheezing, rhonchi, or rales. Respirations even, unlabored. Abdomen: Bowel sounds present. Soft, nontender, nondistended.   Extremities: No cyanosis or clubbing. No edema  Skin:     No rashes and normal coloration. Warm and dry. Neuro:  CN II-XII grossly intact. Sensation intact. A&Ox3  Psych:  Normal mood and affect. I have reviewed ordered lab tests and independently visualized imaging below:    Recent Labs:  Recent Results (from the past 48 hour(s))   HGB & HCT    Collection Time: 11/20/21  2:59 AM   Result Value Ref Range    HGB 8.2 (L) 11.7 - 15.4 g/dL    HCT 24.6 (L) 35.8 - 46.3 %   HGB & HCT    Collection Time: 11/21/21  4:12 AM   Result Value Ref Range    HGB 8.2 (L) 11.7 - 15.4 g/dL    HCT 24.3 (L) 35.8 - 46.3 %   URINALYSIS W/ RFLX MICROSCOPIC    Collection Time: 11/21/21  4:23 AM   Result Value Ref Range    Color YELLOW      Appearance CLOUDY      Specific gravity 1.008 1.001 - 1.023      pH (UA) 6.0 5.0 - 9.0      Protein Negative NEG mg/dL    Glucose Negative mg/dL    Ketone Negative NEG mg/dL    Bilirubin Negative NEG      Blood SMALL (A) NEG      Urobilinogen 0.2 0.2 - 1.0 EU/dL    Nitrites Positive (A) NEG      Leukocyte Esterase LARGE (A) NEG      WBC  0 /hpf    RBC 0-3 0 /hpf    Epithelial cells 0-3 0 /hpf    Bacteria 3+ (H) 0 /hpf    Casts 0-3 0 /lpf       All Micro Results     Procedure Component Value Units Date/Time    CULTURE, URINE [[id number]] Collected: 11/21/21 0423    Order Status: Completed Specimen: Urine from Clean catch Updated: 11/21/21 0801    COVID-19 RAPID TEST [[id number]] Collected: 11/17/21 1930    Order Status: Completed Specimen: Nasopharyngeal Updated: 11/17/21 2011     Specimen source NASAL SWAB        COVID-19 rapid test Not detected        Comment:      The specimen is NEGATIVE for SARS-CoV-2, the novel coronavirus associated with COVID-19. A negative result does not rule out COVID-19. This test has been authorized by the FDA under an Emergency Use Authorization (EUA) for use by authorized laboratories.         Fact sheet for Healthcare Providers: ConventionUpdate.co.nz  Fact sheet for Patients: [email or web address]       Methodology: Isothermal Nucleic Acid Amplification               Other Studies:  No results found. Current Meds:  Current Facility-Administered Medications   Medication Dose Route Frequency    sodium chloride tablet 1 g  1 g Oral BID WITH MEALS    carboxymethylcellulose sodium (REFRESH LIQUIGEL) 1 % ophthalmic solution 1 Drop  1 Drop Both Eyes PRN    bisacodyL (DULCOLAX) suppository 10 mg  10 mg Rectal DAILY PRN    lip protectant (BLISTEX) ointment 1 Each  1 Each Topical PRN    hydrocortisone (ANUSOL-HC) suppository 25 mg  25 mg Rectal Q12H    polyethylene glycol (MIRALAX) packet 17 g  17 g Oral BID    sodium chloride (NS) flush 5-40 mL  5-40 mL IntraVENous Q8H    sodium chloride (NS) flush 5-40 mL  5-40 mL IntraVENous PRN    ondansetron (ZOFRAN ODT) tablet 4 mg  4 mg Oral Q8H PRN    Or    ondansetron (ZOFRAN) injection 4 mg  4 mg IntraVENous Q6H PRN    bisacodyL (DULCOLAX) tablet 5 mg  5 mg Oral DAILY PRN    acetaminophen (TYLENOL) tablet 650 mg  650 mg Oral Q6H PRN    Or    acetaminophen (TYLENOL) suppository 650 mg  650 mg Rectal Q6H PRN    amLODIPine (NORVASC) tablet 2.5 mg  2.5 mg Oral Q12H    escitalopram oxalate (LEXAPRO) tablet 20 mg  20 mg Oral DAILY    diclofenac (VOLTAREN) 1 % topical gel 2 g  2 g Topical QID    levothyroxine (SYNTHROID) tablet 75 mcg  75 mcg Oral ACB    LORazepam (ATIVAN) tablet 0.25 mg  0.25 mg Oral BID    promethazine (PHENERGAN) tablet 12.5 mg  12.5 mg Oral QHS PRN    traMADoL (ULTRAM) tablet 50 mg  50 mg Oral Q6H PRN       Signed:  Elvia Barraza MD    Part of this note may have been written by using a voice dictation software. The note has been proof read but may still contain some grammatical/other typographical errors.

## 2021-11-22 LAB
HCT VFR BLD AUTO: 25.3 % (ref 35.8–46.3)
HGB BLD-MCNC: 8.6 G/DL (ref 11.7–15.4)

## 2021-11-22 PROCEDURE — 74011250637 HC RX REV CODE- 250/637: Performed by: INTERNAL MEDICINE

## 2021-11-22 PROCEDURE — 97530 THERAPEUTIC ACTIVITIES: CPT

## 2021-11-22 PROCEDURE — 74011250636 HC RX REV CODE- 250/636: Performed by: INTERNAL MEDICINE

## 2021-11-22 PROCEDURE — 65270000029 HC RM PRIVATE

## 2021-11-22 PROCEDURE — 97535 SELF CARE MNGMENT TRAINING: CPT

## 2021-11-22 PROCEDURE — 85018 HEMOGLOBIN: CPT

## 2021-11-22 PROCEDURE — 36415 COLL VENOUS BLD VENIPUNCTURE: CPT

## 2021-11-22 PROCEDURE — 74011000258 HC RX REV CODE- 258: Performed by: INTERNAL MEDICINE

## 2021-11-22 RX ADMIN — DICLOFENAC SODIUM 2 G: 10 GEL TOPICAL at 20:45

## 2021-11-22 RX ADMIN — AMLODIPINE BESYLATE 2.5 MG: 5 TABLET ORAL at 20:45

## 2021-11-22 RX ADMIN — DICLOFENAC SODIUM 2 G: 10 GEL TOPICAL at 08:54

## 2021-11-22 RX ADMIN — TRAMADOL HYDROCHLORIDE 50 MG: 50 TABLET, COATED ORAL at 17:34

## 2021-11-22 RX ADMIN — DICLOFENAC SODIUM 2 G: 10 GEL TOPICAL at 13:28

## 2021-11-22 RX ADMIN — Medication 10 ML: at 20:46

## 2021-11-22 RX ADMIN — Medication 1 G: at 08:54

## 2021-11-22 RX ADMIN — POLYETHYLENE GLYCOL 3350 17 G: 17 POWDER, FOR SOLUTION ORAL at 17:34

## 2021-11-22 RX ADMIN — Medication 10 ML: at 13:28

## 2021-11-22 RX ADMIN — DICLOFENAC SODIUM 2 G: 10 GEL TOPICAL at 17:35

## 2021-11-22 RX ADMIN — ONDANSETRON 4 MG: 2 INJECTION INTRAMUSCULAR; INTRAVENOUS at 09:22

## 2021-11-22 RX ADMIN — CARBOXYMETHYLCELLULOSE SODIUM 1 DROP: 10 GEL OPHTHALMIC at 09:23

## 2021-11-22 RX ADMIN — HYDROCORTISONE ACETATE 25 MG: 25 SUPPOSITORY RECTAL at 08:56

## 2021-11-22 RX ADMIN — TRAMADOL HYDROCHLORIDE 50 MG: 50 TABLET, COATED ORAL at 09:13

## 2021-11-22 RX ADMIN — LORAZEPAM 0.25 MG: 0.5 TABLET ORAL at 09:15

## 2021-11-22 RX ADMIN — LEVOTHYROXINE SODIUM 75 MCG: 75 TABLET ORAL at 05:32

## 2021-11-22 RX ADMIN — CEFTRIAXONE 1 G: 1 INJECTION, POWDER, FOR SOLUTION INTRAMUSCULAR; INTRAVENOUS at 13:28

## 2021-11-22 RX ADMIN — ESCITALOPRAM OXALATE 20 MG: 10 TABLET ORAL at 08:54

## 2021-11-22 RX ADMIN — HYDROCORTISONE ACETATE 25 MG: 25 SUPPOSITORY RECTAL at 21:00

## 2021-11-22 RX ADMIN — Medication 1 G: at 17:34

## 2021-11-22 RX ADMIN — AMLODIPINE BESYLATE 2.5 MG: 5 TABLET ORAL at 08:53

## 2021-11-22 RX ADMIN — Medication 10 ML: at 05:32

## 2021-11-22 RX ADMIN — LORAZEPAM 0.25 MG: 0.5 TABLET ORAL at 17:35

## 2021-11-22 NOTE — PROGRESS NOTES
END OF SHIFT NOTE:    Intake/Output  11/21 1901 - 11/22 0700  In: -   Out: 599 [RWAUK:055]   Voiding: NO  Catheter: YES, external   Drain:   External Urinary Catheter 11/19/21 (Active)   Site Assessment Clean, dry, & intact 11/21/21 1910   Repositioned No 11/21/21 1910   Perineal Care No 11/21/21 1910   Wick Changed No 11/21/21 1910   Suction Canister/Tubing Changed No 11/21/21 1910   Urine Output (mL) 300 ml 11/21/21 2231               Stool:  0 occurrences. Stool Assessment  Stool Color: Ronna Cliche; Other (Comment) (blood) (11/18/21 1555)  Stool Appearance: Bloody (per previous documentation, this RN not visualized) (11/19/21 2025)  Stool Amount: Large (11/18/21 1555)    Emesis:  0 occurrences. VITAL SIGNS  Patient Vitals for the past 12 hrs:   Temp Pulse Resp BP SpO2   11/22/21 0315 98.1 °F (36.7 °C) 73 18 116/64 97 %   11/21/21 2231 97.9 °F (36.6 °C) 74 18 109/64 97 %   11/21/21 1910 97.9 °F (36.6 °C) 69 18 123/68 97 %       Pain Assessment  Pain 1  Pain Scale 1: Visual (11/22/21 0315)  Pain Intensity 1: 0 (11/22/21 0315)  Patient Stated Pain Goal: 0 (11/22/21 0315)  Pain Reassessment 1: Yes (11/20/21 2009)  Pain Onset 1: PTA (11/18/21 1950)  Pain Location 1: Generalized (11/21/21 1724)  Pain Orientation 1: Lower (11/20/21 1930)  Pain Description 1: Aching (11/21/21 1724)  Pain Intervention(s) 1: Medication (see MAR) (11/21/21 1724)    Ambulating  No    Additional Information:   Ordered Ativan 0.25mg TID      Shift report given to oncoming nurse at the bedside.     Ochoa Hubbard RN

## 2021-11-22 NOTE — PROGRESS NOTES
Problem: Mobility Impaired (Adult and Pediatric)  Goal: *Acute Goals and Plan of Care (Insert Text)  Outcome: Progressing Towards Goal  Note:   LTG:  (1.)Ms. Penny Li will move from sit to supine  in bed with STAND BY ASSIST within 5 treatment day(s). (2.)Ms. Penny Li will transfer from bed to chair and chair to bed with MODERATE ASSIST using the least restrictive device within 5 treatment day(s). ________________________________________________________________________________________________       PHYSICAL THERAPY: Daily Note and AM 11/22/2021  INPATIENT: PT Visit Days : 3  [place] / [place] / Product Type: Medicare /       NAME/AGE/GENDER: Rudy Chapa is a 80 y.o. female   PRIMARY DIAGNOSIS: Lower GI bleed [K92.2]  Acute blood loss anemia [D62] Acute blood loss anemia Acute blood loss anemia       ICD-10: Treatment Diagnosis:    · Generalized Muscle Weakness (M62.81)  · Abnormal posture (R29.3)   Precaution/Allergies:  Patient has no known allergies. ASSESSMENT:     Ms. Penny Li presents with above diagnoses. Patient demonstrates weakness throughout affecting her balance and mobility. She uses a wheelchair for mobility and has assist for all transfers. Patient would benefit from therapy to ensure ability to continue to transfer at home. She has assist from her grandson and his spouse and has homecare services. Patient should return home at d/c with resumption of services. Patient is upset about her current living situation and current medical situation. She feels she is a burden on her family and she feels ready to go to heaven. Comforted patient and asked SW to put in a  consult. Patient very upset that the doctor may send her home and she is still bleeding on the toilet. She was able to perform a stand pivot transfer between the bed and [facility] but required max A. She did have a good bit of blood in the toilet and on the wipes.   CNA provided hygiene and to discuss with RN. Continue to plan for patient to return home with family and homecare services. 11/22 supine upon contact. Work on rolling from L><R x 4 to get clean up. Then work on B UE/LE exercises with AA. Then left in supine with needs in reach and instructed to call for assistance. This section established at most recent assessment   PROBLEM LIST (Impairments causing functional limitations):  1. Decreased Strength  2. Decreased ADL/Functional Activities  3. Decreased Transfer Abilities  4. Decreased Balance   INTERVENTIONS PLANNED: (Benefits and precautions of physical therapy have been discussed with the patient.)  1. Balance Exercise  2. Bed Mobility  3. Family Education  4. Therapeutic Activites  5. Therapeutic Exercise/Strengthening  6. Transfer Training     TREATMENT PLAN: Frequency/Duration: daily for duration of hospital stay  Rehabilitation Potential For Stated Goals: Good     REHAB RECOMMENDATIONS (at time of discharge pending progress):    Placement: It is my opinion, based on this patient's performance to date, that Ms. Ramiro Owen may benefit from [address] after discharge due to the functional deficits listed above that are likely to improve with skilled rehabilitation because he/she has multiple medical issues that affect his/her functional mobility in the community. Equipment:    None at this time              HISTORY:   History of Present Injury/Illness (Reason for Referral):  Burt Rosado is a 80 y.o. female with medical history of hemorrhoids, coronary artery disease, hip fracture, osteoporosis, chronic hyponatremia, chronic cystitis, hypertension who presents to the emergency department after being evaluated at [facility] emergency center for the patient was complaining of constant need for having to have a bowel movement and when she does this she has no evacuation of feces and has bright red blood.   Apparently at [facility] emergency department emergency room physician attempted to have the patient passed a bowel movement indeed did not have any fecal episode but did have a large bright red blood per rectum episode. Subsequently the patient was sent to our emergency department for further evaluation and treatment. At the time of my evaluation of the patient patient has become significantly agitated while she is oriented to person place and time as well as being able to tell me that she has been constipated for a week but has only been able to pass bright red blood she cannot comprehend why she has to be admitted to the hospital and she further is tearful and a poor historian and she is very agitated. Much of the information obtained in my history and physical was obtained from prior medical records and outside medical records. Course of action in the emergency department-KUB was obtained that demonstrated no acute process. H&H was obtained that demonstrated hemoglobin 9.6 hematocrit of 28.9.. BMP demonstrated a serum sodium of 134 potassium 3.8 chloride of 96 CO2 of 28 anion gap of 10 BUN of 17 creatinine of 0.7. Outside medical records were reviewed and patient has a baseline hemoglobin approximately 11. Secondary to 3 g drop in patient's hemoglobin hematocrit the decision was made to admit the patient for further evaluation and treatment. Past Medical History/Comorbidities:   Ms. Annabella Fletcher  has no past medical history on file. Ms. Annabella Fletcher  has no past surgical history on file.   Social History/Living Environment:   Home Environment: Private residence  One/Two Story Residence: One story  Living Alone: No  Support Systems: Other Family Member(s)  Patient Expects to be Discharged to[de-identified] House  Current DME Used/Available at Home: Wheelchair  Prior Level of Function/Work/Activity:  Wheelchair for mobility and assist for transfers     Number of Personal Factors/Comorbidities that affect the Plan of Care: 1-2: MODERATE COMPLEXITY   EXAMINATION: Most Recent Physical Functioning:   Gross Assessment:                  Posture:     Balance:    Bed Mobility:  Rolling: Minimum assistance (x3 to get clean up)  Duration: 23 Minutes  Wheelchair Mobility:     Transfers:     Gait:            Body Structures Involved:  1. Muscles Body Functions Affected:  1. Movement Related Activities and Participation Affected:  1. Mobility   Number of elements that affect the Plan of Care: 3: MODERATE COMPLEXITY   CLINICAL PRESENTATION:   Presentation: Stable and uncomplicated: LOW COMPLEXITY   CLINICAL DECISION MAKING:   [facility] [facility]   Basic Mobility Inpatient Short Form  How much difficulty does the patient currently have. .. Unable A Lot A Little None   1. Turning over in bed (including adjusting bedclothes, sheets and blankets)? [] 1   [] 2   [x] 3   [] 4   2. Sitting down on and standing up from a chair with arms ( e.g., wheelchair, bedside commode, etc.)   [] 1   [x] 2   [] 3   [] 4   3. Moving from lying on back to sitting on the side of the bed? [] 1   [] 2   [x] 3   [] 4   How much help from another person does the patient currently need. .. Total A Lot A Little None   4. Moving to and from a bed to a chair (including a wheelchair)? [] 1   [x] 2   [] 3   [] 4   5. Need to walk in hospital room? [x] 1   [] 2   [] 3   [] 4   6. Climbing 3-5 steps with a railing? [x] 1   [] 2   [] 3   [] 4   © 2007, Trustees of [facility] [employer], under license to [employer]. All rights reserved      Score:  Initial: 12 Most Recent: X (Date: -- )    Interpretation of Tool:  Represents activities that are increasingly more difficult (i.e. Bed mobility, Transfers, Gait). Medical Necessity:     · Patient is expected to demonstrate progress in   · strength, balance, and coordination  ·  to   · decrease assistance required with bed mobility and transfers.   · .  Reason for Services/Other Comments:  · Patient continues to require skilled intervention due to · Weakness limiting mobility. · .   Use of outcome tool(s) and clinical judgement create a POC that gives a: Clear prediction of patient's progress: LOW COMPLEXITY            TREATMENT:   (In addition to Assessment/Re-Assessment sessions the following treatments were rendered)   Pre-treatment Symptoms/Complaints:  Patient agreeable. Pain: Initial:     0 Post Session:  0     Therapeutic Activity: (23 Minutes   ):  Therapeutic activities including bed mobility,to improve mobility, strength, balance, and coordination. Required moderate verbal and manual cues  to promote static and dynamic balance in standing and promote coordination of bilateral, lower extremity(s). Date:  11/22 Date:   Date:     Activity/Exercise Parameters Parameters Parameters   Ankle pumps 10 AA     Hip abd/add 10 AA     Shoulder flex/ext 10 AA     Elbow flex/ext 10 AA                         Braces/Orthotics/Lines/Etc:   · O2 Device: None (Room air)  Treatment/Session Assessment:    · Response to Treatment: pt making slow progress  · Interdisciplinary Collaboration:   o Physical Therapy Assistant  o Registered Nurse  · After treatment position/precautions:   o Supine in bed  o Bed/Chair-wheels locked  o Call light within reach   · Compliance with Program/Exercises: Will assess as treatment progresses  · Recommendations/Intent for next treatment session: \"Next visit will focus on advancements to more challenging activities and reduction in assistance provided\".   Total Treatment Duration:  PT Patient Time In/Time Out  Time In: 0740  Time Out: 0014    Cathy Cuellar, PTA

## 2021-11-22 NOTE — PROGRESS NOTES
Problem: Self Care Deficits Care Plan (Adult)  Goal: *Acute Goals and Plan of Care (Insert Text)  Outcome: Progressing Towards Goal  Note:   1. Patient will complete grooming with contact guard assist to increase self care independence. 2. Patient will improve static sitting and dynamic sitting at edge of bed for 15 minutes to improve independence with transfers and self cares. 3. Patient will complete toilet transfer with assist of 1 person using adaptive equipment as needed. 4. Patient will complete chair transfer with minimal assist using adaptive equipment as needed. Timeframe: 7 visits       OCCUPATIONAL THERAPY: Initial Assessment, Daily Note, and PM 11/22/2021  INPATIENT: OT Visit Days: 2  [place] / [place] / Product Type: Medicare /      NAME/AGE/GENDER: Kandice Ortega is a 80 y.o. female   PRIMARY DIAGNOSIS:  Lower GI bleed [K92.2]  Acute blood loss anemia [D62] Acute blood loss anemia Acute blood loss anemia       ICD-10: Treatment Diagnosis:    · Generalized Muscle Weakness (M62.81)  · Other lack of cordination (R27.8)  · Difficulty in walking, Not elsewhere classified (R26.2)   Precautions/Allergies:   Patient has no known allergies. ASSESSMENT:     Ms. Anny Elizondo presents with the above diagnosis. At baseline, she lives with her grandson and his family. She requires assistance for all ADLs and transfers to [facility] for mobility. Today, she is agreeable to OT and PT evaluation. She sat up on the EOB with assistance and performed sit<>stands. She required assistance with donning shoes, combing hair, and setup to apply lip balm. She has damage to R shoulder and a forward flexed posture. Her hands are very arthritic. Pt reports she feels \"ready to go\" and has been praying to the [place]. Therapists comforted patient. She is functioning below her baseline and will benefit from continued OT during hospital stay.  OT recommends home with continued care from caregivers at discharge. 11/22/21: Today, pt supine upon arrival. She performed sup>sit with min A x2. She requires occasional support in sitting. She performed leg exercises. Pt performed self-feeding with setup A and grooming in sitting with setup-min A. She agreed to perform sit<>stand attempts with mod A x2 or max A. During stand, pt reports that she had BM. Total assist for hygiene and brief change in supine. She was left with all needs met and in reach. Continue per OT POC. This section established at most recent assessment   PROBLEM LIST (Impairments causing functional limitations):  1. Decreased Strength  2. Decreased ADL/Functional Activities  3. Decreased Transfer Abilities  4. Decreased Activity Tolerance   INTERVENTIONS PLANNED: (Benefits and precautions of occupational therapy have been discussed with the patient.)  1. Activities of daily living training  2. Adaptive equipment training  3. Balance training  4. Therapeutic activity  5. Therapeutic exercise     TREATMENT PLAN: Frequency/Duration: Follow patient 3x/week to address above goals. Rehabilitation Potential For Stated Goals: Good     REHAB RECOMMENDATIONS (at time of discharge pending progress):    Placement: It is my opinion, based on this patient's performance to date, that Ms. Erika Victor may benefit from [address] after discharge due to the functional deficits listed above that are likely to improve with skilled rehabilitation because he/she has multiple medical issues that affect his/her functional mobility in the community. Equipment:    None at this time              OCCUPATIONAL PROFILE AND HISTORY:   History of Present Injury/Illness (Reason for Referral):  See H&P  Past Medical History/Comorbidities:   Ms. Erika Victor  has no past medical history on file. Ms. Erika Victor  has no past surgical history on file.   Social History/Living Environment:   Home Environment: Private residence  One/Two Story Residence: One story  Living Alone: No  Support Systems: Other Family Member(s)  Patient Expects to be Discharged to[de-identified] House  Current DME Used/Available at Home: Wheelchair  Prior Level of Function/Work/Activity:  Lives with grandson, assistance with all ADLs and mobility     Number of Personal Factors/Comorbidities that affect the Plan of Care: Brief history (0):  LOW COMPLEXITY   ASSESSMENT OF OCCUPATIONAL PERFORMANCE[de-identified]   Activities of Daily Living:   Basic ADLs (From Assessment) Complex ADLs (From Assessment)   Feeding: Setup  Oral Facial Hygiene/Grooming: Setup  Bathing: Maximum assistance  Upper Body Dressing: Moderate assistance  Lower Body Dressing: Total assistance  Toileting: Total assistance     Grooming/Bathing/Dressing Activities of Daily Living                             Bed/Mat Mobility  Rolling: Minimum assistance; Moderate assistance  Supine to Sit: Minimum assistance; Assist x2  Sit to Supine: Moderate assistance  Sit to Stand: Maximum assistance  Stand to Sit: Maximum assistance     Most Recent Physical Functioning:   Gross Assessment:                  Posture:  Posture (WDL): Exceptions to WDL  Posture Assessment: Forward head, Kyphosis, Rounded shoulders  Balance:  Sitting: Impaired  Sitting - Static: Fair (occasional)  Sitting - Dynamic: Fair (occasional)  Standing: Impaired  Standing - Static: Constant support  Standing - Dynamic : Constant support Bed Mobility:  Rolling: Minimum assistance; Moderate assistance  Supine to Sit: Minimum assistance; Assist x2  Sit to Supine: Moderate assistance  Duration: 23 Minutes  Wheelchair Mobility:     Transfers:  Sit to Stand: Maximum assistance  Stand to Sit: Maximum assistance            Patient Vitals for the past 6 hrs:   BP BP Patient Position SpO2 Pulse   11/22/21 1111 (!) 94/57 Lying; Supine;  At rest 98 % 74   11/22/21 1115 (!) 95/54   64   11/22/21 1118 (!) 96/58   67       Mental Status  Neurologic State: Alert  Orientation Level: Oriented X4  Cognition: Follows commands  Perception: Appears intact  Perseveration: No perseveration noted  Safety/Judgement: Awareness of environment                          Physical Skills Involved:  1. Range of Motion  2. Balance  3. Strength  4. Activity Tolerance Cognitive Skills Affected (resulting in the inability to perform in a timely and safe manner):  1. [facility] Psychosocial Skills Affected:  1. N/a   Number of elements that affect the Plan of Care: 1-3:  LOW COMPLEXITY   CLINICAL DECISION MAKIN[address] [place] [address] AM-PAC 6 Clicks   Daily Activity Inpatient Short Form  How much help from another person does the patient currently need. .. Total A Lot A Little None   1. Putting on and taking off regular lower body clothing? [x] 1   [] 2   [] 3   [] 4   2. Bathing (including washing, rinsing, drying)? [x] 1   [] 2   [] 3   [] 4   3. Toileting, which includes using toilet, bedpan or urinal?   [x] 1   [] 2   [] 3   [] 4   4. Putting on and taking off regular upper body clothing? [] 1   [x] 2   [] 3   [] 4   5. Taking care of personal grooming such as brushing teeth? [] 1   [] 2   [x] 3   [] 4   6. Eating meals? [] 1   [] 2   [x] 3   [] 4   © , Trustees of [address] [place] [address], under license to [employer]. All rights reserved      Score:  Initial: 11 Most Recent: X (Date: -- )    Interpretation of Tool:  Represents activities that are increasingly more difficult (i.e. Bed mobility, Transfers, Gait). Medical Necessity:     · Skilled intervention continues to be required due to the above deficits. Reason for Services/Other Comments:  · See Above deficits.    Use of outcome tool(s) and clinical judgement create a POC that gives a: LOW COMPLEXITY         TREATMENT:   (In addition to Assessment/Re-Assessment sessions the following treatments were rendered)     Pre-treatment Symptoms/Complaints:    Pain: Initial:   Pain Intensity 1: 0  Post Session:  0     Self Care: (33): Procedure(s) (per grid) utilized to improve and/or restore self-care/home management as related to dressing, self feeding and Functional mobility. Required moderate verbal and tactile cueing to facilitate activities of daily living skills and compensatory activities. Braces/Orthotics/Lines/Etc:   · purewick  · O2 Device: None (Room air)  Treatment/Session Assessment:    · Response to Treatment:  Good, supine in bed  · Interdisciplinary Collaboration:   o Physical Therapist  o Occupational Therapist  o Registered Nurse  · After treatment position/precautions:   o Supine in bed  o Bed/Chair-wheels locked  o Bed in low position  o Call light within reach  o RN notified   · Compliance with Program/Exercises: Will assess as treatment progresses. · Recommendations/Intent for next treatment session: \"Next visit will focus on advancements to more challenging activities and reduction in assistance provided\".   Total Treatment Duration:  OT Patient Time In/Time Out  Time In: 1007  Time Out: [address], [place]

## 2021-11-22 NOTE — PROGRESS NOTES
Problem: Pressure Injury - Risk of  Goal: *Prevention of pressure injury  Description: Document Ralph Scale and appropriate interventions in the flowsheet. Outcome: Progressing Towards Goal  Note: Pressure Injury Interventions:  Sensory Interventions: Assess changes in LOC, Assess need for specialty bed, Minimize linen layers    Moisture Interventions: Absorbent underpads, Apply protective barrier, creams and emollients, Limit adult briefs    Activity Interventions: Assess need for specialty bed, Chair cushion    Mobility Interventions: Assess need for specialty bed, Chair cushion    Nutrition Interventions: Document food/fluid/supplement intake    Friction and Shear Interventions: Apply protective barrier, creams and emollients, Foam dressings/transparent film/skin sealants, Minimize layers                Problem: Patient Education: Go to Patient Education Activity  Goal: Patient/Family Education  Outcome: Progressing Towards Goal     Problem: Falls - Risk of  Goal: *Absence of Falls  Description: Document Yaritza Embs Fall Risk and appropriate interventions in the flowsheet.   Outcome: Progressing Towards Goal  Note: Fall Risk Interventions:  Mobility Interventions: Assess mobility with egress test, Bed/chair exit alarm, Patient to call before getting OOB    Mentation Interventions: Adequate sleep, hydration, pain control, Bed/chair exit alarm, Increase mobility    Medication Interventions: Assess postural VS orthostatic hypotension, Bed/chair exit alarm, Patient to call before getting OOB    Elimination Interventions: Bed/chair exit alarm, Call light in reach, Patient to call for help with toileting needs    History of Falls Interventions: Bed/chair exit alarm, Consult care management for discharge planning, Investigate reason for fall         Problem: Patient Education: Go to Patient Education Activity  Goal: Patient/Family Education  Outcome: Progressing Towards Goal

## 2021-11-22 NOTE — PROGRESS NOTES
Hospitalist Progress Note   Admit Date:  2021  2:04 PM   Name:  Shari Villalobos   Age:  80 y.o. Sex:  female  :  1926   MRN:  [id number]   Room:  [facility]/    Presenting Complaint: Constipation    Reason(s) for Admission: Lower GI bleed [K92.2]  Acute blood loss anemia [D62]     Hospital Course & Interval History:   Mrs. Merlyn Painting is a very nice 81 y/o WF with a h/o CAD, HTN, chronic hyponatremia who was admitted to our service on  with acute blood loss anemia and hematochezia. Hb 1 week prior to admission was in the 10s; 3 months ago was high 11. On presentation it was 9.7. She describes nothing but blood during each BM. She was admitted and started on IVFs, PPI, serial H/H GI consult. Suppository for severe constipation. No endoscopy per patient and family. GYN consulted with concerns for vaginal bleeding but none was discovered, patient s/p hysterectomy so likely all rectal. Hb remains stable. Working with PT/OT well. Subjective (21): Still worried about bleeding. Hb remains stable as do hemodynamics. Had some bleeding this AM per nursing. No chest pain, N/V/D. Assessment & Plan:   # Acute blood loss anemia 2/2 lower GI bleeding              - Still c/o rectal bleeding, though fortunately Hb remains stable in the 8s. GYN saw on  for suspected vaginal bleeding, however no lesions or bleeding noted and she is s/p hysterectomy. No endoscopy per GI d/w family and patient. Hb stable today.      # Acute cystitis              - Culture w GNRs. Start Rocephin x3 days.     # HTN              - Home meds     # Hypothyroidism              - Synthroid     # MDD/anxiety              - Lexapro, low dose Ativan    Dispo/Discharge Planning: Pending. Will d/w family. Diet:  ADULT DIET Dysphagia - Soft & Bite Sized; Low Fat/Low Chol/High Fiber/LILLI; No Red Dye  DVT PPx: SCDs only.   Code status: Full Code    Hospital Problems as of 2021 Date Reviewed: 2021          Codes Class Noted - Resolved POA    * (Principal) Acute blood loss anemia ICD-10-CM: D62  ICD-9-CM: 285.1  11/18/2021 - Present Yes        MDD (major depressive disorder) ICD-10-CM: F32.9  ICD-9-CM: 296.20  11/18/2021 - Present Yes        Anxiety ICD-10-CM: F41.9  ICD-9-CM: 300.00  11/18/2021 - Present Yes        Hypertension ICD-10-CM: I10  ICD-9-CM: 401.9  11/18/2021 - Present Yes        Acquired hypothyroidism ICD-10-CM: E03.9  ICD-9-CM: 244.9  11/18/2021 - Present Yes        Lower GI bleed ICD-10-CM: K92.2  ICD-9-CM: 578.9  11/17/2021 - Present Yes              Objective:     Patient Vitals for the past 24 hrs:   Temp Pulse Resp BP SpO2   11/22/21 0722 97.5 °F (36.4 °C) 69 18 132/62 98 %   11/22/21 0315 98.1 °F (36.7 °C) 73 18 116/64 97 %   11/21/21 2231 97.9 °F (36.6 °C) 74 18 109/64 97 %   11/21/21 1910 97.9 °F (36.6 °C) 69 18 123/68 97 %   11/21/21 1440 97.8 °F (36.6 °C) 72 18 (!) 107/58 97 %   11/21/21 1111 97.8 °F (36.6 °C) 75 16 (!) 103/55 99 %     Oxygen Therapy  O2 Sat (%): 98 % (11/22/21 0722)  Pulse via Oximetry: 67 beats per minute (11/19/21 0745)  O2 Device: None (Room air) (11/22/21 0722)    There is no height or weight on file to calculate BMI. Intake/Output Summary (Last 24 hours) at 11/22/2021 1033  Last data filed at 11/22/2021 0315  Gross per 24 hour   Intake 360 ml   Output 1074 ml   Net -714 ml         Physical Exam:   Blood pressure 132/62, pulse 69, temperature 97.5 °F (36.4 °C), resp. rate 18, SpO2 98 %. General:    Well nourished. No overt distress. Weak, appears stated age. Head:  Normocephalic, atraumatic  Eyes:  Sclerae appear normal.  Pupils equally round. ENT:  Nares appear normal, no drainage. Moist oral mucosa  Neck:  No restricted ROM. Trachea midline   CV:   RRR. No m/r/g. No jugular venous distension. Lungs:   CTAB. No wheezing, rhonchi, or rales. Respirations even, unlabored  Abdomen: Bowel sounds present. Soft, nontender, nondistended. Extremities: No cyanosis or clubbing.   No edema  Skin:     No rashes and normal coloration. Warm and dry. Neuro:  CN II-XII grossly intact. Sensation intact. A&Ox3  Psych:  Normal mood and affect.       I have reviewed ordered lab tests and independently visualized imaging below:    Recent Labs:  Recent Results (from the past 48 hour(s))   HGB & HCT    Collection Time: 11/21/21  4:12 AM   Result Value Ref Range    HGB 8.2 (L) 11.7 - 15.4 g/dL    HCT 24.3 (L) 35.8 - 46.3 %   URINALYSIS W/ RFLX MICROSCOPIC    Collection Time: 11/21/21  4:23 AM   Result Value Ref Range    Color YELLOW      Appearance CLOUDY      Specific gravity 1.008 1.001 - 1.023      pH (UA) 6.0 5.0 - 9.0      Protein Negative NEG mg/dL    Glucose Negative mg/dL    Ketone Negative NEG mg/dL    Bilirubin Negative NEG      Blood SMALL (A) NEG      Urobilinogen 0.2 0.2 - 1.0 EU/dL    Nitrites Positive (A) NEG      Leukocyte Esterase LARGE (A) NEG      WBC  0 /hpf    RBC 0-3 0 /hpf    Epithelial cells 0-3 0 /hpf    Bacteria 3+ (H) 0 /hpf    Casts 0-3 0 /lpf   CULTURE, URINE    Collection Time: 11/21/21  4:23 AM    Specimen: Clean catch; Urine   Result Value Ref Range    Special Requests: NO SPECIAL REQUESTS      Culture result: (A)       >100,000 COLONIES/mL GRAM NEGATIVE RODS SUBCULTURE IN PROGRESS    Culture result:        50,000-100,000 COLONIES/mL MIXED SKIN LONNIE ISOLATED   HGB & HCT    Collection Time: 11/22/21  6:16 AM   Result Value Ref Range    HGB 8.6 (L) 11.7 - 15.4 g/dL    HCT 25.3 (L) 35.8 - 46.3 %       All Micro Results     Procedure Component Value Units Date/Time    CULTURE, URINE [[id number]]  (Abnormal) Collected: 11/21/21 0423    Order Status: Completed Specimen: Urine from Clean catch Updated: 11/22/21 0902     Special Requests: NO SPECIAL REQUESTS        Culture result:       >100,000 COLONIES/mL GRAM NEGATIVE RODS SUBCULTURE IN PROGRESS                  50,000-100,000 COLONIES/mL MIXED SKIN LONNIE ISOLATED          COVID-19 RAPID TEST [[id number]] Collected: 11/17/21 1930    Order Status: Completed Specimen: Nasopharyngeal Updated: 11/17/21 2011     Specimen source NASAL SWAB        COVID-19 rapid test Not detected        Comment:      The specimen is NEGATIVE for SARS-CoV-2, the novel coronavirus associated with COVID-19. A negative result does not rule out COVID-19. This test has been authorized by the FDA under an Emergency Use Authorization (EUA) for use by authorized laboratories. Fact sheet for Healthcare Providers: [employer]  Fact sheet for Patients: [employer]       Methodology: Isothermal Nucleic Acid Amplification               Other Studies:  No results found.     Current Meds:  Current Facility-Administered Medications   Medication Dose Route Frequency    cefTRIAXone (ROCEPHIN) 1 g in 0.9% sodium chloride (MBP/ADV) 50 mL MBP  1 g IntraVENous Q24H    sodium chloride tablet 1 g  1 g Oral BID WITH MEALS    carboxymethylcellulose sodium (REFRESH LIQUIGEL) 1 % ophthalmic solution 1 Drop  1 Drop Both Eyes PRN    bisacodyL (DULCOLAX) suppository 10 mg  10 mg Rectal DAILY PRN    lip protectant (BLISTEX) ointment 1 Each  1 Each Topical PRN    hydrocortisone (ANUSOL-HC) suppository 25 mg  25 mg Rectal Q12H    polyethylene glycol (MIRALAX) packet 17 g  17 g Oral BID    sodium chloride (NS) flush 5-40 mL  5-40 mL IntraVENous Q8H    sodium chloride (NS) flush 5-40 mL  5-40 mL IntraVENous PRN    ondansetron (ZOFRAN ODT) tablet 4 mg  4 mg Oral Q8H PRN    Or    ondansetron (ZOFRAN) injection 4 mg  4 mg IntraVENous Q6H PRN    bisacodyL (DULCOLAX) tablet 5 mg  5 mg Oral DAILY PRN    acetaminophen (TYLENOL) tablet 650 mg  650 mg Oral Q6H PRN    Or    acetaminophen (TYLENOL) suppository 650 mg  650 mg Rectal Q6H PRN    amLODIPine (NORVASC) tablet 2.5 mg  2.5 mg Oral Q12H    escitalopram oxalate (LEXAPRO) tablet 20 mg  20 mg Oral DAILY    diclofenac (VOLTAREN) 1 % topical gel 2 g  2 g Topical QID    levothyroxine (SYNTHROID) tablet 75 mcg  75 mcg Oral ACB    LORazepam (ATIVAN) tablet 0.25 mg  0.25 mg Oral BID    promethazine (PHENERGAN) tablet 12.5 mg  12.5 mg Oral QHS PRN    traMADoL (ULTRAM) tablet 50 mg  50 mg Oral Q6H PRN       Signed:  Artem Schwartz MD    Part of this note may have been written by using a voice dictation software. The note has been proof read but may still contain some grammatical/other typographical errors.

## 2021-11-22 NOTE — PROGRESS NOTES
Problem: Mobility Impaired (Adult and Pediatric)  Goal: *Acute Goals and Plan of Care (Insert Text)  Outcome: Progressing Towards Goal  Note:   LTG:  (1.)Ms. Digna Muller will move from sit to supine  in bed with STAND BY ASSIST within 5 treatment day(s). (2.)Ms. Digna Muller will transfer from bed to chair and chair to bed with MODERATE ASSIST using the least restrictive device within 5 treatment day(s). ________________________________________________________________________________________________       PHYSICAL THERAPY: Daily Note and AM 11/22/2021  INPATIENT: PT Visit Days : 3  [place] / [place] / Product Type: Medicare /       NAME/AGE/GENDER: Anthony Looney is a 80 y.o. female   PRIMARY DIAGNOSIS: Lower GI bleed [K92.2]  Acute blood loss anemia [D62] Acute blood loss anemia Acute blood loss anemia       ICD-10: Treatment Diagnosis:    · Generalized Muscle Weakness (M62.81)  · Abnormal posture (R29.3)   Precaution/Allergies:  Patient has no known allergies. ASSESSMENT:     Ms. Digna Muller presents with above diagnoses. Patient demonstrates weakness throughout affecting her balance and mobility. She uses a wheelchair for mobility and has assist for all transfers. Patient would benefit from therapy to ensure ability to continue to transfer at home. She has assist from her grandson and his spouse and has homecare services. Patient should return home at d/c with resumption of services. Patient is upset about her current living situation and current medical situation. She feels she is a burden on her family and she feels ready to go to heaven. Comforted patient and asked SW to put in a  consult. Patient very upset that the doctor may send her home and she is still bleeding on the toilet. She was able to perform a stand pivot transfer between the bed and [facility] [place] required max A. She did have a good bit of blood in the toilet and on the wipes.   CNA provided hygiene and to discuss with RN. Continue to plan for patient to return home with family and homecare services. 11/22 supine upon contact. Work on rolling from L><R x 4 to get clean up. Then work on B UE/LE exercises with AA. Then left in supine with needs in reach and instructed to call for assistance. 11/22 mid morning  Supine upon arrival.  Work on bed mobility as follows:supine>EOB with Mod-Min A x 2 with (extra time). Sitting on the EOB working on her balance such as [address] through B UE, while exercising B LE with guidance. Then sit>stand with Max A x 2 with verbal cues to stand tall. Then return to supine with Max A x 2 with needs in reach and instructed to call for assistance. This section established at most recent assessment   PROBLEM LIST (Impairments causing functional limitations):  1. Decreased Strength  2. Decreased ADL/Functional Activities  3. Decreased Transfer Abilities  4. Decreased Balance   INTERVENTIONS PLANNED: (Benefits and precautions of physical therapy have been discussed with the patient.)  1. Balance Exercise  2. Bed Mobility  3. Family Education  4. Therapeutic Activites  5. Therapeutic Exercise/Strengthening  6. Transfer Training     TREATMENT PLAN: Frequency/Duration: daily for duration of hospital stay  Rehabilitation Potential For Stated Goals: Good     REHAB RECOMMENDATIONS (at time of discharge pending progress):    Placement: It is my opinion, based on this patient's performance to date, that Ms. Yamilka Shahid may benefit from [address] after discharge due to the functional deficits listed above that are likely to improve with skilled rehabilitation because he/she has multiple medical issues that affect his/her functional mobility in the community.   Equipment:    None at this time              HISTORY:   History of Present Injury/Illness (Reason for Referral):  Adelso Martinez is a 80 y.o. female with medical history of hemorrhoids, coronary artery disease, hip fracture, osteoporosis, chronic hyponatremia, chronic cystitis, hypertension who presents to the emergency department after being evaluated at [facility] emergency [place] for the patient was complaining of constant need for having to have a bowel movement and when she does this she has no evacuation of feces and has bright red blood. Apparently at [facility] emergency department emergency room physician attempted to have the patient passed a bowel movement indeed did not have any fecal episode but did have a large bright red blood per rectum episode. Subsequently the patient was sent to our emergency department for further evaluation and treatment. At the time of my evaluation of the patient patient has become significantly agitated while she is oriented to person place and time as well as being able to tell me that she has been constipated for a week but has only been able to pass bright red blood she cannot comprehend why she has to be admitted to the hospital and she further is tearful and a poor historian and she is very agitated. Much of the information obtained in my history and physical was obtained from prior medical records and outside medical records. Course of action in the emergency department-KUB was obtained that demonstrated no acute process. H&H was obtained that demonstrated hemoglobin 9.6 hematocrit of 28.9.. BMP demonstrated a serum sodium of 134 potassium 3.8 chloride of 96 CO2 of 28 anion gap of 10 BUN of 17 creatinine of 0.7. Outside medical records were reviewed and patient has a baseline hemoglobin approximately 11. Secondary to 3 g drop in patient's hemoglobin hematocrit the decision was made to admit the patient for further evaluation and treatment. Past Medical History/Comorbidities:   Ms. Milan Morocho  has no past medical history on file. Ms. Milan Morocho  has no past surgical history on file.   Social History/Living Environment:   Home Environment: Private residence  One/Two Story Residence: One story  Living Alone: No  Support Systems: Other Family Member(s)  Patient Expects to be Discharged to[de-identified] [employer]  Current DME Used/Available at Home: Wheelchair  Prior Level of Function/Work/Activity:  Wheelchair for mobility and assist for transfers     Number of Personal Factors/Comorbidities that affect the Plan of Care: 1-2: MODERATE COMPLEXITY   EXAMINATION:   Most Recent Physical Functioning:   Gross Assessment:                  Posture:     Balance:  Sitting: Impaired  Sitting - Static: Fair (occasional)  Sitting - Dynamic: Fair (occasional)  Standing: Impaired  Standing - Static: Constant support  Standing - Dynamic : Constant support Bed Mobility:  Rolling: Minimum assistance; Moderate assistance; Additional time  Supine to Sit: Minimum assistance; Additional time; Assist x2  Sit to Supine: Moderate assistance; Assist x2  Wheelchair Mobility:     Transfers:  Sit to Stand: Maximum assistance; Assist x2  Stand to Sit: Maximum assistance; Assist x2  Duration: 33 Minutes  Gait:            Body Structures Involved:  1. Muscles Body Functions Affected:  1. Movement Related Activities and Participation Affected:  1. Mobility   Number of elements that affect the Plan of Care: 3: MODERATE COMPLEXITY   CLINICAL PRESENTATION:   Presentation: Stable and uncomplicated: LOW COMPLEXITY   CLINICAL DECISION MAKIN [place] [address] AM-PAC 6 Clicks   Basic Mobility Inpatient Short Form  How much difficulty does the patient currently have. .. Unable A Lot A Little None   1. Turning over in bed (including adjusting bedclothes, sheets and blankets)? [] 1   [] 2   [x] 3   [] 4   2. Sitting down on and standing up from a chair with arms ( e.g., wheelchair, bedside commode, etc.)   [] 1   [x] 2   [] 3   [] 4   3. Moving from lying on back to sitting on the side of the bed? [] 1   [] 2   [x] 3   [] 4   How much help from another person does the patient currently need. ..  Total A Lot A Little None   4. Moving to and from a bed to a chair (including a wheelchair)? [] 1   [x] 2   [] 3   [] 4   5. Need to walk in hospital room? [x] 1   [] 2   [] 3   [] 4   6. Climbing 3-5 steps with a railing? [x] 1   [] 2   [] 3   [] 4   © 2007, Trustees of [address] [place] [address] [address] [id number], under license to [facility]. All rights reserved      Score:  Initial: 12 Most Recent: X (Date: -- )    Interpretation of Tool:  Represents activities that are increasingly more difficult (i.e. Bed mobility, Transfers, Gait). Medical Necessity:     · Patient is expected to demonstrate progress in   · strength, balance, and coordination  ·  to   · decrease assistance required with bed mobility and transfers. · .  Reason for Services/Other Comments:  · Patient continues to require skilled intervention due to   · Weakness limiting mobility. · .   Use of outcome tool(s) and clinical judgement create a POC that gives a: Clear prediction of patient's progress: LOW COMPLEXITY            TREATMENT:   (In addition to Assessment/Re-Assessment sessions the following treatments were rendered)   Pre-treatment Symptoms/Complaints:  Patient agreeable. Pain: Initial:     0 Post Session:  0     Therapeutic Activity: (  33 Minutes ):  Therapeutic activities including bed mobility,to improve mobility, strength, balance, and coordination. Required moderate verbal and manual cues  to promote static and dynamic balance in standing and promote coordination of bilateral, lower extremity(s). Date:  11/22 Date:  11/22 pm   Date:     Activity/Exercise Parameters Parameters Parameters   Ankle pumps 10 AA     Hip abd/add 10 AA     Shoulder flex/ext 10 AA     Elbow flex/ext 10 AA     Marching in place  10 AA                  Braces/Orthotics/Lines/Etc:   · O2 Device: None (Room air)  Treatment/Session Assessment:    · Response to Treatment: pt making slow progress needs Max A x 2 for standing.   · Interdisciplinary Collaboration:   o Physical Therapy Assistant  o Registered Nurse  · After treatment position/precautions:   o Supine in bed  o Bed/Chair-wheels locked  o Call light within reach   · Compliance with Program/Exercises: Will assess as treatment progresses  · Recommendations/Intent for next treatment session: \"Next visit will focus on advancements to more challenging activities and reduction in assistance provided\".   Total Treatment Duration:  PT Patient Time In/Time Out  Time In: 1007  Time Out: One Renee Cuellar, PTA

## 2021-11-23 LAB
HCT VFR BLD AUTO: 25.4 % (ref 35.8–46.3)
HGB BLD-MCNC: 8.5 G/DL (ref 11.7–15.4)

## 2021-11-23 PROCEDURE — 85018 HEMOGLOBIN: CPT

## 2021-11-23 PROCEDURE — 74011250636 HC RX REV CODE- 250/636: Performed by: INTERNAL MEDICINE

## 2021-11-23 PROCEDURE — 74011000258 HC RX REV CODE- 258: Performed by: INTERNAL MEDICINE

## 2021-11-23 PROCEDURE — 74011250637 HC RX REV CODE- 250/637: Performed by: INTERNAL MEDICINE

## 2021-11-23 PROCEDURE — 97535 SELF CARE MNGMENT TRAINING: CPT

## 2021-11-23 PROCEDURE — 97530 THERAPEUTIC ACTIVITIES: CPT

## 2021-11-23 PROCEDURE — 36415 COLL VENOUS BLD VENIPUNCTURE: CPT

## 2021-11-23 PROCEDURE — 65270000029 HC RM PRIVATE

## 2021-11-23 RX ADMIN — Medication 1 G: at 18:19

## 2021-11-23 RX ADMIN — CEFTRIAXONE 1 G: 1 INJECTION, POWDER, FOR SOLUTION INTRAMUSCULAR; INTRAVENOUS at 13:54

## 2021-11-23 RX ADMIN — DICLOFENAC SODIUM 2 G: 10 GEL TOPICAL at 13:54

## 2021-11-23 RX ADMIN — Medication 10 ML: at 13:54

## 2021-11-23 RX ADMIN — Medication 10 ML: at 05:40

## 2021-11-23 RX ADMIN — Medication 10 ML: at 22:00

## 2021-11-23 RX ADMIN — POLYETHYLENE GLYCOL 3350 17 G: 17 POWDER, FOR SOLUTION ORAL at 08:52

## 2021-11-23 RX ADMIN — LEVOTHYROXINE SODIUM 75 MCG: 75 TABLET ORAL at 05:40

## 2021-11-23 RX ADMIN — TRAMADOL HYDROCHLORIDE 50 MG: 50 TABLET, COATED ORAL at 19:29

## 2021-11-23 RX ADMIN — LORAZEPAM 0.25 MG: 0.5 TABLET ORAL at 18:20

## 2021-11-23 RX ADMIN — Medication 1 G: at 08:53

## 2021-11-23 RX ADMIN — DICLOFENAC SODIUM 2 G: 10 GEL TOPICAL at 09:00

## 2021-11-23 RX ADMIN — AMLODIPINE BESYLATE 2.5 MG: 5 TABLET ORAL at 08:52

## 2021-11-23 RX ADMIN — AMLODIPINE BESYLATE 2.5 MG: 5 TABLET ORAL at 19:29

## 2021-11-23 RX ADMIN — LORAZEPAM 0.25 MG: 0.5 TABLET ORAL at 08:52

## 2021-11-23 RX ADMIN — POLYETHYLENE GLYCOL 3350 17 G: 17 POWDER, FOR SOLUTION ORAL at 18:20

## 2021-11-23 RX ADMIN — DICLOFENAC SODIUM 2 G: 10 GEL TOPICAL at 22:00

## 2021-11-23 RX ADMIN — HYDROCORTISONE ACETATE 25 MG: 25 SUPPOSITORY RECTAL at 21:00

## 2021-11-23 RX ADMIN — ESCITALOPRAM OXALATE 20 MG: 10 TABLET ORAL at 08:52

## 2021-11-23 NOTE — PROGRESS NOTES
Called and talked with Lizzy louise Poster, [phone]. He is bringing Medicaid application up here today along with Jason. Advised I would fax the Medicaid application and make a copy of the insurance card. Obtained his address and called and gave heads up on possible resumption of care to [facility]. Spoke with Nithya @ [phone]. Continuing to follow for any dc needs.

## 2021-11-23 NOTE — PROGRESS NOTES
[facility] RE consult Note         Admit Date:  11/17/2021    Today's Date:  11/23/2021    CC:  Rectal bleeding, anemia    Subjective:     GI re consulted for rectal bleeding. Patient is a 81 yo female with h/o CAD, osteoporosis, anxiety, hemorrhoids who is seen as reconsult due to ongoing lower GI bleeding. Patient have 1-2 bloody BM per day. Currently on Miralax 17g PO BID. Hgb remains fairly stable in 8-9 range. She denies significant abdominal pain. She is being treated with Anusol suppositories BID as well. Negative for recent colonoscopy exam.  At time of original consult on 11/18/21 patient and the family declined endoscopic evaluation. Today, patient indicated that she wants to do \"whatever it takes\" to address bleeding symptoms. Family could not be reached by phone this PM.  Negative for melena, N/V, CP, and SOB. ROS is otherwise negative.      Medications:   Current Facility-Administered Medications   Medication Dose Route Frequency    cefTRIAXone (ROCEPHIN) 1 g in 0.9% sodium chloride (MBP/ADV) 50 mL MBP  1 g IntraVENous Q24H    sodium chloride tablet 1 g  1 g Oral BID WITH MEALS    carboxymethylcellulose sodium (REFRESH LIQUIGEL) 1 % ophthalmic solution 1 Drop  1 Drop Both Eyes PRN    bisacodyL (DULCOLAX) suppository 10 mg  10 mg Rectal DAILY PRN    lip protectant (BLISTEX) ointment 1 Each  1 Each Topical PRN    hydrocortisone (ANUSOL-HC) suppository 25 mg  25 mg Rectal Q12H    polyethylene glycol (MIRALAX) packet 17 g  17 g Oral BID    sodium chloride (NS) flush 5-40 mL  5-40 mL IntraVENous Q8H    sodium chloride (NS) flush 5-40 mL  5-40 mL IntraVENous PRN    ondansetron (ZOFRAN ODT) tablet 4 mg  4 mg Oral Q8H PRN    Or    ondansetron (ZOFRAN) injection 4 mg  4 mg IntraVENous Q6H PRN    bisacodyL (DULCOLAX) tablet 5 mg  5 mg Oral DAILY PRN    acetaminophen (TYLENOL) tablet 650 mg  650 mg Oral Q6H PRN    Or    acetaminophen (TYLENOL) suppository 650 mg  650 mg Rectal Q6H PRN    amLODIPine (NORVASC) tablet 2.5 mg  2.5 mg Oral Q12H    escitalopram oxalate (LEXAPRO) tablet 20 mg  20 mg Oral DAILY    diclofenac (VOLTAREN) 1 % topical gel 2 g  2 g Topical QID    levothyroxine (SYNTHROID) tablet 75 mcg  75 mcg Oral ACB    LORazepam (ATIVAN) tablet 0.25 mg  0.25 mg Oral BID    promethazine (PHENERGAN) tablet 12.5 mg  12.5 mg Oral QHS PRN    traMADoL (ULTRAM) tablet 50 mg  50 mg Oral Q6H PRN       Review of Systems:  ROS was obtained, with pertinent positives as listed above. No chest pain or SOB. Diet:  Regular dysaphia diet    Objective:   Vitals:  Visit Vitals  BP (!) 133/59 (BP 1 Location: Left upper arm, BP Patient Position: At rest)   Pulse 68   Temp 98 °F (36.7 °C)   Resp 16   SpO2 98%     Intake/Output:  11/23 0701 - 11/23 1900  In: 360 [P.O.:360]  Out: -   11/21 1901 - 11/23 0700  In: 238 [P.O.:238]  Out: Jon Seek [[id number]]  Exam:  General appearance: alert, cooperative, elderly female in no distress  Lungs: clear to auscultation bilaterally anteriorly  Heart: regular rate and rhythm  Abdomen: soft, non-tender, bowel sounds normal, no masses  Extremities: extremities normal, atraumatic, no cyanosis or edema  Neuro:  alert and oriented    Data Review (Labs):    Recent Labs     11/23/21  0609 11/22/21  0616 11/21/21  0412   HGB 8.5* 8.6* 8.2*   HCT 25.4* 25.3* 24.3*       Assessment:     Principal Problem:    Acute blood loss anemia (11/18/2021)    Active Problems:    Lower GI bleed (11/17/2021)      MDD (major depressive disorder) (11/18/2021)      Anxiety (11/18/2021)      Hypertension (11/18/2021)      Acquired hypothyroidism (11/18/2021)    79 yo female with PMH of CAD, osteoporosis, HTN, chronic hyponatremia, chronic anxiety, hemorrhoids admitted by the hospitalist service for rectal bleeding with drop in hgb. Etiology of bleeding includes hemorrhoids, anal fissure, and rectal mass. Constipation symptoms are reported to be chronic.   GYN evaluation negative for vaginal or vulvar lesion as cause of bleeding. Patient and her family previously did not want to have invasive procedures including flexible sigmoidoscopy vs. Colonoscopy. Therefore GI signed off 11/20. We are re consulted for continue BRRB. Hgb is fairly stable at 8.5 today    Plan:     - Monitor Hgb, transfuse PRN  - Continue soft diet given difficulty with mastication  - Continue bowel regimen of Miralax BID. Starting Fiber supplement BID  - Continue Ansuol suppository BID. - Will reach out to patient's family in AM to see if agreeable to patient having a Flexible sigmoidoscopy for evaluation of bleeding. Patient appears to be agreeable give persistent nature of bleeding.  Will hold diet after midnight in event they wish to proceed with exam.  - On Rocephin for acute cystitis     Nurys Haas MD

## 2021-11-23 NOTE — PROGRESS NOTES
Nurse notified this am from therapy that pt had a soft bowel movement with a small amount of bright red blood, notified MD, no new orders at this time will continue to montiorHourly rounds performed through shift, pt denies needs at this time. Bed in low position, locked and call light/personal items within reach. Report given to night shift nurse.

## 2021-11-23 NOTE — PROGRESS NOTES
Problem: Self Care Deficits Care Plan (Adult)  Goal: *Acute Goals and Plan of Care (Insert Text)  Outcome: Progressing Towards Goal  Note:   1. Patient will complete grooming with contact guard assist to increase self care independence. 2. Patient will improve static sitting and dynamic sitting at edge of bed for 15 minutes to improve independence with transfers and self cares. 3. Patient will complete toilet transfer with assist of 1 person using adaptive equipment as needed. 4. Patient will complete chair transfer with minimal assist using adaptive equipment as needed. Timeframe: 7 visits       OCCUPATIONAL THERAPY: Daily Note and AM 11/23/2021  INPATIENT: OT Visit Days: 3  [place] / [place] / Product Type: Medicare /      NAME/AGE/GENDER: Kandice Ortega is a 80 y.o. female   PRIMARY DIAGNOSIS:  Lower GI bleed [K92.2]  Acute blood loss anemia [D62] Acute blood loss anemia Acute blood loss anemia       ICD-10: Treatment Diagnosis:    · Generalized Muscle Weakness (M62.81)  · Other lack of cordination (R27.8)  · Difficulty in walking, Not elsewhere classified (R26.2)   Precautions/Allergies:   Patient has no known allergies. ASSESSMENT:     Ms. Anny Elizondo presents with the above diagnosis. At baseline, she lives with her grandson and his family. She requires assistance for all ADLs and transfers to [facility] for mobility. Today, she is agreeable to OT and PT evaluation. She sat up on the EOB with assistance and performed sit<>stands. She required assistance with donning shoes, combing hair, and setup to apply lip balm. She has damage to R shoulder and a forward flexed posture. Her hands are very arthritic. Pt reports she feels \"ready to go\" and has been praying to the [place]. Therapists comforted patient. She is functioning below her baseline and will benefit from continued OT during hospital stay. OT recommends home with continued care from caregivers at discharge. 11/22/21: Today, pt supine upon arrival. She performed sup>sit with min A x2. She requires occasional support in sitting. She performed leg exercises. Pt performed self-feeding with setup A and grooming in sitting with setup-min A. She agreed to perform sit<>stand attempts with mod A x2 or max A. During stand, pt reports that she had BM. Total assist for hygiene and brief change in supine. She was left with all needs met and in reach. Continue per OT POC.    11/23/2021  Pt seen in room with PTA present for assistance. Pt asking to was her face, pt set up for bathing with bath pack. Pt was able to was her face but needed assistance for the rest of her body. Pt up to [facility] with maximum assistance. Pt incontinent on the was to the [facility]. Pt with BM and blood noted. Pt was total assistance for es care and donning brief in bed. RN was notified of blood, blood seemed to be coming from vagina not rectum. Pt left supine in bed with all needs. This section established at most recent assessment   PROBLEM LIST (Impairments causing functional limitations):  1. Decreased Strength  2. Decreased ADL/Functional Activities  3. Decreased Transfer Abilities  4. Decreased Activity Tolerance   INTERVENTIONS PLANNED: (Benefits and precautions of occupational therapy have been discussed with the patient.)  1. Activities of daily living training  2. Adaptive equipment training  3. Balance training  4. Therapeutic activity  5. Therapeutic exercise     TREATMENT PLAN: Frequency/Duration: Follow patient 3x/week to address above goals. Rehabilitation Potential For Stated Goals: Good     REHAB RECOMMENDATIONS (at time of discharge pending progress):    Placement: It is my opinion, based on this patient's performance to date, that Ms. Yahir Aparicio may benefit from [address] [facility] after discharge due to the functional deficits listed above that are likely to improve with skilled rehabilitation because he/she has multiple medical issues that affect his/her functional mobility in the community. Equipment:    None at this time              OCCUPATIONAL PROFILE AND HISTORY:   History of Present Injury/Illness (Reason for Referral):  See H&P  Past Medical History/Comorbidities:   Ms. Yahir Aparicio  has no past medical history on file. Ms. Yahir Aparicio  has no past surgical history on file. Social History/Living Environment:   Home Environment: Private residence  One/Two Story Residence: One story  Living Alone: No  Support Systems: Other Family Member(s)  Patient Expects to be Discharged to[de-identified] House  Current DME Used/Available at Home: Wheelchair  Prior Level of Function/Work/Activity:  Lives with grandson, assistance with all ADLs and mobility     Number of Personal Factors/Comorbidities that affect the Plan of Care: Brief history (0):  LOW COMPLEXITY   ASSESSMENT OF OCCUPATIONAL PERFORMANCE[de-identified]   Activities of Daily Living:   Basic ADLs (From Assessment) Complex ADLs (From Assessment)   Feeding: Setup  Oral Facial Hygiene/Grooming: Setup  Bathing: Maximum assistance  Upper Body Dressing: Moderate assistance  Lower Body Dressing: Total assistance  Toileting: Total assistance     Grooming/Bathing/Dressing Activities of Daily Living                             Bed/Mat Mobility  Rolling: Minimum assistance; Moderate assistance; Assist x2  Supine to Sit: Minimum assistance; Assist x2  Sit to Supine: Moderate assistance; Assist x2  Sit to Stand: Maximum assistance; Additional time; Assist x2  Stand to Sit: Maximum assistance; Additional time; Assist x2     Most Recent Physical Functioning:   Gross Assessment:                  Posture:  Posture (WDL): Exceptions to WDL  Posture Assessment:  Forward head, Kyphosis, Rounded shoulders  Balance:  Sitting: Impaired  Sitting - Static: Fair (occasional)  Sitting - Dynamic: Fair (occasional)  Standing: Impaired  Standing - Static: Constant support  Standing - Dynamic : Constant support Bed Mobility:  Rolling: Minimum assistance; Moderate assistance; Assist x2  Supine to Sit: Minimum assistance; Assist x2  Sit to Supine: Moderate assistance; Assist x2  Wheelchair Mobility:     Transfers:  Sit to Stand: Maximum assistance; Additional time; Assist x2  Stand to Sit: Maximum assistance; Additional time; Assist x2  Toilet Transfer : Maximum assistance; Additional time; Assist x2  Duration: 38 Minutes            Patient Vitals for the past 6 hrs:   BP BP Patient Position SpO2 Pulse   21 0805 (!) 133/59 At rest 98 % 68   21 1216 (!) 106/58 At rest 100 % 65       Mental Status  Neurologic State: Alert, Lethargic  Orientation Level: Oriented X4  Cognition: Follows commands  Perception: Appears intact  Perseveration: No perseveration noted  Safety/Judgement: Awareness of environment                          Physical Skills Involved:  1. Range of Motion  2. Balance  3. Strength  4. Activity Tolerance Cognitive Skills Affected (resulting in the inability to perform in a timely and safe manner):  1. [facility] Psychosocial Skills Affected:  1. N/a   Number of elements that affect the Plan of Care: 1-3:  LOW COMPLEXITY   CLINICAL DECISION MAKIN[address] [place] [address] AM-PAC 6 Clicks   Daily Activity Inpatient Short Form  How much help from another person does the patient currently need. .. Total A Lot A Little None   1. Putting on and taking off regular lower body clothing? [x] 1   [] 2   [] 3   [] 4   2. Bathing (including washing, rinsing, drying)? [x] 1   [] 2   [] 3   [] 4   3. Toileting, which includes using toilet, bedpan or urinal?   [x] 1   [] 2   [] 3   [] 4   4. Putting on and taking off regular upper body clothing? [] 1   [x] 2   [] 3   [] 4   5. Taking care of personal grooming such as brushing teeth? [] 1   [] 2   [x] 3   [] 4   6. Eating meals? [] 1   [] 2   [x] 3   [] 4   © , Trustees of [address] [place], [place] [address], under license to [employer].  All rights reserved      Score:  Initial: 11 Most Recent: X (Date: -- )    Interpretation of Tool:  Represents activities that are increasingly more difficult (i.e. Bed mobility, Transfers, Gait). Medical Necessity:     · Skilled intervention continues to be required due to the above deficits. Reason for Services/Other Comments:  · See Above deficits. Use of outcome tool(s) and clinical judgement create a POC that gives a: LOW COMPLEXITY         TREATMENT:   (In addition to Assessment/Re-Assessment sessions the following treatments were rendered)     Pre-treatment Symptoms/Complaints:    Pain: Initial:      Post Session:  0     Self Care: (40 min): Procedure(s) (per grid) utilized to improve and/or restore self-care/home management as related to dressing, bathing, toileting, grooming and functional mobility. Required maximal verbal, manual, tactile and   cueing to facilitate activities of daily living skills and compensatory activities. Braces/Orthotics/Lines/Etc:   · purewick  · O2 Device: None (Room air)  Treatment/Session Assessment:    · Response to Treatment:  Good, supine in bed  · Interdisciplinary Collaboration:   o Physical Therapy Assistant  o Occupational Therapist  o Registered Nurse  · After treatment position/precautions:   o Supine in bed  o Bed/Chair-wheels locked  o Bed in low position  o Call light within reach  o RN notified   · Compliance with Program/Exercises: Will assess as treatment progresses. · Recommendations/Intent for next treatment session: \"Next visit will focus on advancements to more challenging activities and reduction in assistance provided\".   Total Treatment Duration:  OT Patient Time In/Time Out  Time In: 1055  Time Out: [facility], OT

## 2021-11-23 NOTE — PROGRESS NOTES
Problem: Mobility Impaired (Adult and Pediatric)  Goal: *Acute Goals and Plan of Care (Insert Text)  Outcome: Progressing Towards Goal  Note:   LTG:  (1.)Ms. Jamie Escobar will move from sit to supine  in bed with STAND BY ASSIST within 5 treatment day(s). (2.)Ms. Jamie Escobar will transfer from bed to chair and chair to bed with MODERATE ASSIST using the least restrictive device within 5 treatment day(s). ________________________________________________________________________________________________       PHYSICAL THERAPY: Daily Note and AM 11/23/2021  INPATIENT: PT Visit Days : 4  [place] / Product Type: Medicare /       NAME/AGE/GENDER: Sheeba Cano is a 80 y.o. female   PRIMARY DIAGNOSIS: Lower GI bleed [K92.2]  Acute blood loss anemia [D62] Acute blood loss anemia Acute blood loss anemia       ICD-10: Treatment Diagnosis:    · Generalized Muscle Weakness (M62.81)  · Abnormal posture (R29.3)   Precaution/Allergies:  Patient has no known allergies. ASSESSMENT:     Ms. Jamie Escobar presents with above diagnoses. Patient demonstrates weakness throughout affecting her balance and mobility. She uses a wheelchair for mobility and has assist for all transfers. Patient would benefit from therapy to ensure ability to continue to transfer at home. She has assist from her grandson and his spouse and has homecare services. Patient should return home at d/c with resumption of services. Patient is upset about her current living situation and current medical situation. She feels she is a burden on her family and she feels ready to go to heaven. Comforted patient and asked SW to put in a  consult. Patient very upset that the doctor may send her home and she is still bleeding on the toilet. She was able to perform a stand pivot transfer between the bed and [facility] but required max A. She did have a good bit of blood in the toilet and on the wipes.   CNA provided hygiene and to discuss with RN. Continue to plan for patient to return home with family and homecare services. 11/22 supine upon contact. Work on rolling from L><R x 4 to get clean up. Then work on B UE/LE exercises with AA. Then left in supine with needs in reach and instructed to call for assistance. 11/22 mid morning  Supine upon arrival.  Work on bed mobility as follows:supine>EOB with Mod-Min A x 2 with (extra time). Sitting on the EOB working on her balance such as [address] through B UE, while exercising B LE with guidance. Then sit>stand with Max A x 2 with verbal cues to stand tall. Then return to supine with Max A x 2 with needs in reach and instructed to call for assistance. 11/23 supine upon arrival.  Work on bed mobility as follows:supine>eob with Min A-Mod A x 2 (extra time). While on the eob performs B LE exercises with guidance while working on balance. Stated I need to go to the restroom. Then stood with OT & PT Max A and transfer over to the [facility]. Pt could not stand up long enough for OT to clean pt. OT & PT put her back in the bed with Max A. Then rolled her from L><R x 4 to get clean up. Then reposition her in the bed with needs in reach and instructed her to call for assistance. Pt is a good co-tx, because pt fatigue quickly and needs a lot of assistance. This section established at most recent assessment   PROBLEM LIST (Impairments causing functional limitations):  1. Decreased Strength  2. Decreased ADL/Functional Activities  3. Decreased Transfer Abilities  4. Decreased Balance   INTERVENTIONS PLANNED: (Benefits and precautions of physical therapy have been discussed with the patient.)  1. Balance Exercise  2. Bed Mobility  3. Family Education  4. Therapeutic Activites  5. Therapeutic Exercise/Strengthening  6.  Transfer Training     TREATMENT PLAN: Frequency/Duration: daily for duration of hospital stay  Rehabilitation Potential For Stated Goals: Good     REHAB RECOMMENDATIONS (at time of discharge pending progress):    Placement: It is my opinion, based on this patient's performance to date, that Ms. Sarah Dempsey may benefit from [address] after discharge due to the functional deficits listed above that are likely to improve with skilled rehabilitation because he/she has multiple medical issues that affect his/her functional mobility in the community. Equipment:    None at this time              HISTORY:   History of Present Injury/Illness (Reason for Referral):  Angela Huntley is a 80 y.o. female with medical history of hemorrhoids, coronary artery disease, hip fracture, osteoporosis, chronic hyponatremia, chronic cystitis, hypertension who presents to the emergency department after being evaluated at [facility] emergency center for the patient was complaining of constant need for having to have a bowel movement and when she does this she has no evacuation of feces and has bright red blood. Apparently at [facility] emergency department emergency room physician attempted to have the patient passed a bowel movement indeed did not have any fecal episode but did have a large bright red blood per rectum episode. Subsequently the patient was sent to our emergency department for further evaluation and treatment. At the time of my evaluation of the patient patient has become significantly agitated while she is oriented to person place and time as well as being able to tell me that she has been constipated for a week but has only been able to pass bright red blood she cannot comprehend why she has to be admitted to the hospital and she further is tearful and a poor historian and she is very agitated. Much of the information obtained in my history and physical was obtained from prior medical records and outside medical records. Course of action in the emergency department-KUB was obtained that demonstrated no acute process.   H&H was obtained that demonstrated hemoglobin 9.6 hematocrit of 28.9.. BMP demonstrated a serum sodium of 134 potassium 3.8 chloride of 96 CO2 of 28 anion gap of 10 BUN of 17 creatinine of 0.7. Outside medical records were reviewed and patient has a baseline hemoglobin approximately 11. Secondary to 3 g drop in patient's hemoglobin hematocrit the decision was made to admit the patient for further evaluation and treatment. Past Medical History/Comorbidities:   Ms. Diana Irby  has no past medical history on file. Ms. Diana Irby  has no past surgical history on file. Social History/Living Environment:   Home Environment: Private residence  One/Two Story Residence: One story  Living Alone: No  Support Systems: Other Family Member(s)  Patient Expects to be Discharged to[de-identified] House  Current DME Used/Available at Home: Wheelchair  Prior Level of Function/Work/Activity:  Wheelchair for mobility and assist for transfers     Number of Personal Factors/Comorbidities that affect the Plan of Care: 1-2: MODERATE COMPLEXITY   EXAMINATION:   Most Recent Physical Functioning:   Gross Assessment:                  Posture:     Balance:  Sitting: Impaired  Sitting - Static: Fair (occasional)  Sitting - Dynamic: Fair (occasional)  Standing: Impaired  Standing - Static: Constant support  Standing - Dynamic : Constant support Bed Mobility:  Rolling: Minimum assistance; Moderate assistance; Assist x2  Supine to Sit: Minimum assistance; Assist x2  Sit to Supine: Moderate assistance; Assist x2  Wheelchair Mobility:     Transfers:  Sit to Stand: Maximum assistance; Additional time; Assist x2  Stand to Sit: Maximum assistance; Additional time; Assist x2  Toilet Transfer : Maximum assistance; Additional time; Assist x2  Duration: 38 Minutes  Gait:            Body Structures Involved:  1. Muscles Body Functions Affected:  1. Movement Related Activities and Participation Affected:  1.  Mobility   Number of elements that affect the Plan of Care: 3: MODERATE COMPLEXITY CLINICAL PRESENTATION:   Presentation: Stable and uncomplicated: LOW COMPLEXITY   CLINICAL DECISION MAKIN[address] [place] [address] AM-PAC 6 Clicks   Basic Mobility Inpatient Short Form  How much difficulty does the patient currently have. .. Unable A Lot A Little None   1. Turning over in bed (including adjusting bedclothes, sheets and blankets)? [] 1   [] 2   [x] 3   [] 4   2. Sitting down on and standing up from a chair with arms ( e.g., wheelchair, bedside commode, etc.)   [] 1   [x] 2   [] 3   [] 4   3. Moving from lying on back to sitting on the side of the bed? [] 1   [] 2   [x] 3   [] 4   How much help from another person does the patient currently need. .. Total A Lot A Little None   4. Moving to and from a bed to a chair (including a wheelchair)? [] 1   [x] 2   [] 3   [] 4   5. Need to walk in hospital room? [x] 1   [] 2   [] 3   [] 4   6. Climbing 3-5 steps with a railing? [x] 1   [] 2   [] 3   [] 4   © , Trustees of [address] [place], [place] [address], under license to [employer]. All rights reserved      Score:  Initial: 12 Most Recent: X (Date: -- )    Interpretation of Tool:  Represents activities that are increasingly more difficult (i.e. Bed mobility, Transfers, Gait). Medical Necessity:     · Patient is expected to demonstrate progress in   · strength, balance, and coordination  ·  to   · decrease assistance required with bed mobility and transfers. · .  Reason for Services/Other Comments:  · Patient continues to require skilled intervention due to   · Weakness limiting mobility. · .   Use of outcome tool(s) and clinical judgement create a POC that gives a: Clear prediction of patient's progress: LOW COMPLEXITY            TREATMENT:   (In addition to Assessment/Re-Assessment sessions the following treatments were rendered)   Pre-treatment Symptoms/Complaints:  Patient agreeable.     Pain: Initial:   Pain Intensity 1: 0 0 Post Session:      Therapeutic Activity: (  38 Minutes ):  Therapeutic activities including bed mobility,to improve mobility, strength, balance, and coordination. Required moderate verbal and manual cues  to promote static and dynamic balance in standing and promote coordination of bilateral, lower extremity(s). Date:  11/22 Date:  11/22 pm   Date:  11/23     Activity/Exercise Parameters Parameters Parameters   Ankle pumps 10 AA  10   Hip abd/add 10 AA     Shoulder flex/ext 10 AA     Elbow flex/ext 10 AA     Marching in place  10 AA 10   LAQ   10           Braces/Orthotics/Lines/Etc:   · O2 Device: None (Room air)  Treatment/Session Assessment:    · Response to Treatment: continue to be a Max A for all bed mobility and transfers  · Interdisciplinary Collaboration:   o Physical Therapy Assistant  o Registered Nurse  · After treatment position/precautions:   o Supine in bed  o Bed/Chair-wheels locked  o Call light within reach   · Compliance with Program/Exercises: Will assess as treatment progresses  · Recommendations/Intent for next treatment session: \"Next visit will focus on advancements to more challenging activities and reduction in assistance provided\".   Total Treatment Duration:  PT Patient Time In/Time Out  Time In: 0930  Time Out: [address], PTA

## 2021-11-23 NOTE — PROGRESS NOTES
In to see patient and then called grandchristy and talked with him States he wants to know why she is continuing to bleed. States he was planning on bringing her back home with him, but if she continues to bleed, she will end up back in hospital. Advised per MD staff, her Hgb is stable at present and per notes, family did not want to pursue further workup for this bc of how weak she is now. Advise we could resume Home care with [facility]. I advised I would let Dr. Lacie Rousseau know. Spoke with Dr. Lacie Rousseau and advised of above information. He is going to call aspen Vaughn @ [phone].

## 2021-11-23 NOTE — PROGRESS NOTES
Hospitalist Progress Note   Admit Date:  2021  2:04 PM   Name:  Tyler Lott   Age:  80 y.o. Sex:  female  :  1926   MRN:  [id number]   Room:  [facility]/    Presenting Complaint: Constipation    Reason(s) for Admission: Lower GI bleed [K92.2]  Acute blood loss anemia [D62]     Hospital Course & Interval History:   Mrs. Tao Newby is a very nice 79 y/o WF with a h/o CAD, HTN, chronic hyponatremia who was admitted to our service on  with acute blood loss anemia and hematochezia. Hb 1 week prior to admission was in the 10s; 3 months ago was high 11. On presentation it was 9.7. She describes nothing but blood during each BM. She was admitted and started on IVFs, PPI, serial H/H GI consult. Suppository for severe constipation. No endoscopy per patient and family. Kenny Lentz was consulted with concerns for vaginal bleeding but no sourcewas identified, patient s/p hysterectomy so likely all rectal. Hb remains stable. Working with PT/OT well but still weak. She has had ongoing rectal bleeding since admission. Subjective (21):  Just had BM and per RN there was a large amount of blood. Hb is stable today. She remains concerned about bleeding. No chest pain, abdo pain or N/V/D. Assessment & Plan:   # Acute blood loss anemia 2/2 lower GI bleeding              - Persistent rectal bleeding. Hb still stable. GYN saw on  for suspected vaginal bleeding, however no lesions or bleeding noted and she is s/p hysterectomy. Previously decided no endoscopy. Re-consult GI given persistent rectal bleeding.     # Acute cystitis              - Culture w GNRs. Rocephin x3d, EOT .      # HTN              - Home meds     # Hypothyroidism              - Synthroid     # MDD/anxiety              - Lexapro, low dose Ativan    Dispo/Discharge Planning: Pending. Diet:  ADULT DIET Dysphagia - Soft & Bite Sized;  Low Fat/Low Chol/High Fiber/LILLI; No Red Dye  DVT PPx: SCDs   Code status: Full Code    Hospital Problems as of 11/23/2021 Date Reviewed: 11/18/2021          Codes Class Noted - Resolved POA    * (Principal) Acute blood loss anemia ICD-10-CM: D62  ICD-9-CM: 285.1  11/18/2021 - Present Yes        MDD (major depressive disorder) ICD-10-CM: F32.9  ICD-9-CM: 296.20  11/18/2021 - Present Yes        Anxiety ICD-10-CM: F41.9  ICD-9-CM: 300.00  11/18/2021 - Present Yes        Hypertension ICD-10-CM: I10  ICD-9-CM: 401.9  11/18/2021 - Present Yes        Acquired hypothyroidism ICD-10-CM: E03.9  ICD-9-CM: 244.9  11/18/2021 - Present Yes        Lower GI bleed ICD-10-CM: K92.2  ICD-9-CM: 578.9  11/17/2021 - Present Yes              Objective:     Patient Vitals for the past 24 hrs:   Temp Pulse Resp BP SpO2   11/23/21 0805 98 °F (36.7 °C) 68 16 (!) 133/59 98 %   11/23/21 0245 98.5 °F (36.9 °C) 64 17 126/69 98 %   11/22/21 2244 98 °F (36.7 °C) 66 17 122/71 96 %   11/22/21 1903 98.1 °F (36.7 °C) 83 17 105/65 91 %   11/22/21 1515 98.1 °F (36.7 °C)       11/22/21 1514 (!) 96.5 °F (35.8 °C) 76 17 (!) 106/57 98 %   11/22/21 1118  67  (!) 96/58    11/22/21 1115  64  (!) 95/54    11/22/21 1111 98.3 °F (36.8 °C) 74 18 (!) 94/57 98 %     Oxygen Therapy  O2 Sat (%): 98 % (11/23/21 0805)  Pulse via Oximetry: 67 beats per minute (11/19/21 0745)  O2 Device: None (Room air) (11/23/21 0234)    There is no height or weight on file to calculate BMI. Intake/Output Summary (Last 24 hours) at 11/23/2021 1103  Last data filed at 11/23/2021 0857  Gross per 24 hour   Intake 598 ml   Output 1275 ml   Net -677 ml         Physical Exam:   Blood pressure (!) 133/59, pulse 68, temperature 98 °F (36.7 °C), resp. rate 16, SpO2 98 %. General:    Well nourished, thin. No overt distress. Appears stated age, weak appearing. Head:  Normocephalic, atraumatic  Eyes:  Sclerae appear normal.  Pupils equally round. ENT:  Nares appear normal, no drainage. Moist oral mucosa  Neck:  No restricted ROM. Trachea midline   CV:   RRR. No m/r/g.   No jugular venous distension. Lungs:   CTAB. No wheezing, rhonchi, or rales. Respirations even, unlabored  Abdomen: Bowel sounds present. Soft, nontender, nondistended. Extremities: No cyanosis or clubbing. No edema  Skin:     No rashes and normal coloration. Warm and dry. Neuro:  CN II-XII grossly intact. Sensation intact. A&Ox3  Psych:  Normal mood and affect. I have reviewed ordered lab tests and independently visualized imaging below:    Recent Labs:  Recent Results (from the past 48 hour(s))   HGB & HCT    Collection Time: 11/22/21  6:16 AM   Result Value Ref Range    HGB 8.6 (L) 11.7 - 15.4 g/dL    HCT 25.3 (L) 35.8 - 46.3 %   HGB & HCT    Collection Time: 11/23/21  6:09 AM   Result Value Ref Range    HGB 8.5 (L) 11.7 - 15.4 g/dL    HCT 25.4 (L) 35.8 - 46.3 %       All Micro Results     Procedure Component Value Units Date/Time    CULTURE, URINE [[id number]]  (Abnormal) Collected: 11/21/21 0423    Order Status: Completed Specimen: Urine from Clean catch Updated: 11/23/21 0821     Special Requests: NO SPECIAL REQUESTS        Culture result:       >100,000 COLONIES/mL GRAM NEGATIVE RODS IDENTIFICATION AND SUSCEPTIBILITY TO FOLLOW                  >100,000 COLONIES/mL MIXED SKIN LONNIE ISOLATED          COVID-19 RAPID TEST [[id number]] Collected: 11/17/21 1930    Order Status: Completed Specimen: Nasopharyngeal Updated: 11/17/21 2011     Specimen source NASAL SWAB        COVID-19 rapid test Not detected        Comment:      The specimen is NEGATIVE for SARS-CoV-2, the novel coronavirus associated with COVID-19. A negative result does not rule out COVID-19. This test has been authorized by the FDA under an Emergency Use Authorization (EUA) for use by authorized laboratories.         Fact sheet for Healthcare Providers: ConventionUpdate.co.nz  Fact sheet for Patients: ConventionUpdate.co.nz       Methodology: Isothermal Nucleic Acid Amplification               Other Studies:  No results found. Current Meds:  Current Facility-Administered Medications   Medication Dose Route Frequency    cefTRIAXone (ROCEPHIN) 1 g in 0.9% sodium chloride (MBP/ADV) 50 mL MBP  1 g IntraVENous Q24H    sodium chloride tablet 1 g  1 g Oral BID WITH MEALS    carboxymethylcellulose sodium (REFRESH LIQUIGEL) 1 % ophthalmic solution 1 Drop  1 Drop Both Eyes PRN    bisacodyL (DULCOLAX) suppository 10 mg  10 mg Rectal DAILY PRN    lip protectant (BLISTEX) ointment 1 Each  1 Each Topical PRN    hydrocortisone (ANUSOL-HC) suppository 25 mg  25 mg Rectal Q12H    polyethylene glycol (MIRALAX) packet 17 g  17 g Oral BID    sodium chloride (NS) flush 5-40 mL  5-40 mL IntraVENous Q8H    sodium chloride (NS) flush 5-40 mL  5-40 mL IntraVENous PRN    ondansetron (ZOFRAN ODT) tablet 4 mg  4 mg Oral Q8H PRN    Or    ondansetron (ZOFRAN) injection 4 mg  4 mg IntraVENous Q6H PRN    bisacodyL (DULCOLAX) tablet 5 mg  5 mg Oral DAILY PRN    acetaminophen (TYLENOL) tablet 650 mg  650 mg Oral Q6H PRN    Or    acetaminophen (TYLENOL) suppository 650 mg  650 mg Rectal Q6H PRN    amLODIPine (NORVASC) tablet 2.5 mg  2.5 mg Oral Q12H    escitalopram oxalate (LEXAPRO) tablet 20 mg  20 mg Oral DAILY    diclofenac (VOLTAREN) 1 % topical gel 2 g  2 g Topical QID    levothyroxine (SYNTHROID) tablet 75 mcg  75 mcg Oral ACB    LORazepam (ATIVAN) tablet 0.25 mg  0.25 mg Oral BID    promethazine (PHENERGAN) tablet 12.5 mg  12.5 mg Oral QHS PRN    traMADoL (ULTRAM) tablet 50 mg  50 mg Oral Q6H PRN       Signed:  Estevan Justin MD    Part of this note may have been written by using a voice dictation software. The note has been proof read but may still contain some grammatical/other typographical errors.

## 2021-11-23 NOTE — PROGRESS NOTES
END OF SHIFT NOTE:    Intake/Output  11/22 1901 - 11/23 0700  In: 120 [P.O.:120]  Out: 975 [Urine:975]   Voiding: YES  Catheter: YES  Drain:   External Urinary Catheter 11/19/21 (Active)   Site Assessment Clean, dry, & intact 11/23/21 0234   Repositioned No 11/23/21 0234   Perineal Care No 11/23/21 0234   Wick Changed No 11/23/21 0234   Suction Canister/Tubing Changed No 11/23/21 0234   Urine Output (mL) 625 ml 11/23/21 0548               Stool:  0 occurrences. Stool Assessment  Stool Color: Nancey Hams; Maroon (11/22/21 0843)  Stool Appearance: Bloody (11/22/21 1930)  Stool Amount: Large (11/22/21 0843)  Stool Source/Status: Incontinence; Rectum (11/22/21 0843)    Emesis:  0 occurrences. VITAL SIGNS  Patient Vitals for the past 12 hrs:   Temp Pulse Resp BP SpO2   11/23/21 0245 98.5 °F (36.9 °C) 64 17 126/69 98 %   11/22/21 2244 98 °F (36.7 °C) 66 17 122/71 96 %   11/22/21 1903 98.1 °F (36.7 °C) 83 17 105/65 91 %       Pain Assessment  Pain 1  Pain Scale 1: Visual (11/23/21 0234)  Pain Intensity 1: 0 (11/23/21 0234)  Patient Stated Pain Goal: 0 (11/23/21 0234)  Pain Reassessment 1: Yes (11/22/21 1015)  Pain Onset 1: PTA (11/18/21 1950)  Pain Location 1: Foot (11/22/21 0913)  Pain Orientation 1: Lower (11/20/21 1930)  Pain Description 1: Aching (11/22/21 0913)  Pain Intervention(s) 1: Medication (see MAR) (11/22/21 0913)    Ambulating  No    Additional Information:         Shift report given to oncoming nurse at the bedside.     Hong Campos RN

## 2021-11-24 LAB
ANION GAP SERPL CALC-SCNC: 6 MMOL/L (ref 7–16)
BACTERIA SPEC CULT: ABNORMAL
BACTERIA SPEC CULT: ABNORMAL
BUN SERPL-MCNC: 25 MG/DL (ref 8–23)
CALCIUM SERPL-MCNC: 9.7 MG/DL (ref 8.3–10.4)
CHLORIDE SERPL-SCNC: 98 MMOL/L (ref 98–107)
CO2 SERPL-SCNC: 26 MMOL/L (ref 21–32)
CREAT SERPL-MCNC: 0.88 MG/DL (ref 0.6–1)
ERYTHROCYTE [DISTWIDTH] IN BLOOD BY AUTOMATED COUNT: 14.6 % (ref 11.9–14.6)
GLUCOSE SERPL-MCNC: 102 MG/DL (ref 65–100)
HCT VFR BLD AUTO: 26.6 % (ref 35.8–46.3)
HGB BLD-MCNC: 8.6 G/DL (ref 11.7–15.4)
MCH RBC QN AUTO: 32.3 PG (ref 26.1–32.9)
MCHC RBC AUTO-ENTMCNC: 32.3 G/DL (ref 31.4–35)
MCV RBC AUTO: 100 FL (ref 79.6–97.8)
NRBC # BLD: 0 K/UL (ref 0–0.2)
PLATELET # BLD AUTO: 347 K/UL (ref 150–450)
PMV BLD AUTO: 9 FL (ref 9.4–12.3)
POTASSIUM SERPL-SCNC: 4.6 MMOL/L (ref 3.5–5.1)
RBC # BLD AUTO: 2.66 M/UL (ref 4.05–5.2)
SERVICE CMNT-IMP: ABNORMAL
SODIUM SERPL-SCNC: 130 MMOL/L (ref 136–145)
WBC # BLD AUTO: 6 K/UL (ref 4.3–11.1)

## 2021-11-24 PROCEDURE — 74011250637 HC RX REV CODE- 250/637: Performed by: FAMILY MEDICINE

## 2021-11-24 PROCEDURE — 74011250637 HC RX REV CODE- 250/637: Performed by: INTERNAL MEDICINE

## 2021-11-24 PROCEDURE — 36415 COLL VENOUS BLD VENIPUNCTURE: CPT

## 2021-11-24 PROCEDURE — 74011250636 HC RX REV CODE- 250/636: Performed by: HOSPITALIST

## 2021-11-24 PROCEDURE — 85027 COMPLETE CBC AUTOMATED: CPT

## 2021-11-24 PROCEDURE — 80048 BASIC METABOLIC PNL TOTAL CA: CPT

## 2021-11-24 PROCEDURE — 97530 THERAPEUTIC ACTIVITIES: CPT

## 2021-11-24 PROCEDURE — 74011000258 HC RX REV CODE- 258: Performed by: HOSPITALIST

## 2021-11-24 PROCEDURE — 65270000029 HC RM PRIVATE

## 2021-11-24 RX ADMIN — TRAMADOL HYDROCHLORIDE 50 MG: 50 TABLET, COATED ORAL at 13:19

## 2021-11-24 RX ADMIN — DICLOFENAC SODIUM 2 G: 10 GEL TOPICAL at 22:00

## 2021-11-24 RX ADMIN — TRAMADOL HYDROCHLORIDE 50 MG: 50 TABLET, COATED ORAL at 20:01

## 2021-11-24 RX ADMIN — AMLODIPINE BESYLATE 2.5 MG: 5 TABLET ORAL at 21:39

## 2021-11-24 RX ADMIN — Medication 1 G: at 17:17

## 2021-11-24 RX ADMIN — Medication 10 ML: at 21:39

## 2021-11-24 RX ADMIN — CEFEPIME HYDROCHLORIDE 1 G: 1 INJECTION, POWDER, FOR SOLUTION INTRAMUSCULAR; INTRAVENOUS at 09:25

## 2021-11-24 RX ADMIN — Medication 1 G: at 08:48

## 2021-11-24 RX ADMIN — PSYLLIUM HUSK 1 PACKET: 3.4 POWDER ORAL at 17:18

## 2021-11-24 RX ADMIN — Medication 10 ML: at 06:56

## 2021-11-24 RX ADMIN — DICLOFENAC SODIUM 2 G: 10 GEL TOPICAL at 17:19

## 2021-11-24 RX ADMIN — DICLOFENAC SODIUM 2 G: 10 GEL TOPICAL at 09:00

## 2021-11-24 RX ADMIN — LEVOTHYROXINE SODIUM 75 MCG: 75 TABLET ORAL at 08:47

## 2021-11-24 RX ADMIN — AMLODIPINE BESYLATE 2.5 MG: 5 TABLET ORAL at 08:48

## 2021-11-24 RX ADMIN — SALINE NASAL SPRAY 2 SPRAY: 1.5 SOLUTION NASAL at 17:29

## 2021-11-24 RX ADMIN — HYDROCORTISONE ACETATE 25 MG: 25 SUPPOSITORY RECTAL at 21:39

## 2021-11-24 RX ADMIN — LORAZEPAM 0.25 MG: 0.5 TABLET ORAL at 17:17

## 2021-11-24 RX ADMIN — PSYLLIUM HUSK 1 PACKET: 3.4 POWDER ORAL at 00:53

## 2021-11-24 RX ADMIN — Medication 10 ML: at 13:19

## 2021-11-24 RX ADMIN — DICLOFENAC SODIUM 2 G: 10 GEL TOPICAL at 13:21

## 2021-11-24 RX ADMIN — POLYETHYLENE GLYCOL 3350 17 G: 17 POWDER, FOR SOLUTION ORAL at 17:19

## 2021-11-24 RX ADMIN — LORAZEPAM 0.25 MG: 0.5 TABLET ORAL at 08:48

## 2021-11-24 RX ADMIN — ESCITALOPRAM OXALATE 20 MG: 10 TABLET ORAL at 08:48

## 2021-11-24 NOTE — PROGRESS NOTES
Problem: Mobility Impaired (Adult and Pediatric)  Goal: *Acute Goals and Plan of Care (Insert Text)  Outcome: Progressing Towards Goal  Note:   LTG:  (1.)Ms. Lizeth Canales will move from sit to supine  in bed with STAND BY ASSIST within 5 treatment day(s). (2.)Ms. Lizeth Canales will transfer from bed to chair and chair to bed with MODERATE ASSIST using the least restrictive device within 5 treatment day(s). ________________________________________________________________________________________________       PHYSICAL THERAPY: Daily Note and PM 11/24/2021  INPATIENT: PT Visit Days : 5  [place] / [place] / Product Type: Medicare /       NAME/AGE/GENDER: Silvana Shelton is a 80 y.o. female   PRIMARY DIAGNOSIS: Lower GI bleed [K92.2]  Acute blood loss anemia [D62] Acute blood loss anemia Acute blood loss anemia       ICD-10: Treatment Diagnosis:    · Generalized Muscle Weakness (M62.81)  · Abnormal posture (R29.3)   Precaution/Allergies:  Patient has no known allergies. ASSESSMENT:     Ms. Lizeth Canales presents with above diagnoses. Patient demonstrates weakness throughout affecting her balance and mobility. She uses a wheelchair for mobility and has assist for all transfers. Patient would benefit from therapy to ensure ability to continue to transfer at home. She has assist from her grandson and his spouse and has homecare services. Patient should return home at d/c with resumption of services. Patient is upset about her current living situation and current medical situation. She feels she is a burden on her family and she feels ready to go to heaven. Comforted patient and asked SW to put in a  consult. Patient very upset that the doctor may send her home and she is still bleeding on the toilet. She was able to perform a stand pivot transfer between the bed and [facility] but required max A. She did have a good bit of blood in the toilet and on the wipes.   CNA provided hygiene and to discuss with RN. Continue to plan for patient to return home with family and homecare services. 11/22 supine upon contact. Work on rolling from L><R x 4 to get clean up. Then work on B UE/LE exercises with AA. Then left in supine with needs in reach and instructed to call for assistance. 11/22 mid morning  Supine upon arrival.  Work on bed mobility as follows:supine>EOB with Mod-Min A x 2 with (extra time). Sitting on the EOB working on her balance such as [address] through B UE, while exercising B LE with guidance. Then sit>stand with Max A x 2 with verbal cues to stand tall. Then return to supine with Max A x 2 with needs in reach and instructed to call for assistance. 11/23 supine upon arrival.  Work on bed mobility as follows:supine>eob with Min A-Mod A x 2 (extra time). While on the eob performs B LE exercises with guidance while working on balance. Stated I need to go to the restroom. Then stood with OT & PT Max A and transfer over to the [facility]. Pt could not stand up long enough for OT to clean pt. OT & PT put her back in the bed with Max A. Then rolled her from L><R x 4 to get clean up. Then reposition her in the bed with needs in reach and instructed her to call for assistance. Pt is a good co-tx, because pt fatigue quickly and needs a lot of assistance. 11/24 supine upon arrival.  Performs bed mobility as follows:supine>eob with Min A. While sitting on the eob, performs B LE exercises with guidance. Return back to bed with Min A. Left in supine with needs in reach and instructed to call for assistance. This section established at most recent assessment   PROBLEM LIST (Impairments causing functional limitations):  1. Decreased Strength  2. Decreased ADL/Functional Activities  3. Decreased Transfer Abilities  4. Decreased Balance   INTERVENTIONS PLANNED: (Benefits and precautions of physical therapy have been discussed with the patient.)  1. Balance Exercise  2.  Bed Mobility  3. Family Education  4. Therapeutic Activites  5. Therapeutic Exercise/Strengthening  6. Transfer Training     TREATMENT PLAN: Frequency/Duration: daily for duration of hospital stay  Rehabilitation Potential For Stated Goals: Good     REHAB RECOMMENDATIONS (at time of discharge pending progress):    Placement: It is my opinion, based on this patient's performance to date, that Ms. Melendez Sender may benefit from [address] after discharge due to the functional deficits listed above that are likely to improve with skilled rehabilitation because he/she has multiple medical issues that affect his/her functional mobility in the community. Equipment:    None at this time              HISTORY:   History of Present Injury/Illness (Reason for Referral):  Cora Solorzano is a 80 y.o. female with medical history of hemorrhoids, coronary artery disease, hip fracture, osteoporosis, chronic hyponatremia, chronic cystitis, hypertension who presents to the emergency department after being evaluated at [facility] emergency center for the patient was complaining of constant need for having to have a bowel movement and when she does this she has no evacuation of feces and has bright red blood. Apparently at [facility] emergency department emergency room physician attempted to have the patient passed a bowel movement indeed did not have any fecal episode but did have a large bright red blood per rectum episode. Subsequently the patient was sent to our emergency department for further evaluation and treatment. At the time of my evaluation of the patient patient has become significantly agitated while she is oriented to person place and time as well as being able to tell me that she has been constipated for a week but has only been able to pass bright red blood she cannot comprehend why she has to be admitted to the hospital and she further is tearful and a poor historian and she is very agitated.   Much of the information obtained in my history and physical was obtained from prior medical records and outside medical records. Course of action in the emergency department-KUB was obtained that demonstrated no acute process. H&H was obtained that demonstrated hemoglobin 9.6 hematocrit of 28.9.. BMP demonstrated a serum sodium of 134 potassium 3.8 chloride of 96 CO2 of 28 anion gap of 10 BUN of 17 creatinine of 0.7. Outside medical records were reviewed and patient has a baseline hemoglobin approximately 11. Secondary to 3 g drop in patient's hemoglobin hematocrit the decision was made to admit the patient for further evaluation and treatment. Past Medical History/Comorbidities:   Ms. Radha Li  has no past medical history on file. Ms. Radha Li  has no past surgical history on file. Social History/Living Environment:   Home Environment: Private residence  One/Two Story Residence: One story  Living Alone: No  Support Systems: Other Family Member(s)  Patient Expects to be Discharged to[de-identified] House  Current DME Used/Available at Home: Wheelchair  Prior Level of Function/Work/Activity:  Wheelchair for mobility and assist for transfers     Number of Personal Factors/Comorbidities that affect the Plan of Care: 1-2: MODERATE COMPLEXITY   EXAMINATION:   Most Recent Physical Functioning:   Gross Assessment:                  Posture:     Balance:  Sitting: Impaired  Sitting - Static: Fair (occasional)  Sitting - Dynamic: Fair (occasional)  Standing: Impaired Bed Mobility:  Rolling: Minimum assistance; Moderate assistance  Supine to Sit: Minimum assistance  Sit to Supine: Minimum assistance  Wheelchair Mobility:     Transfers:  Duration: 23 Minutes  Gait:            Body Structures Involved:  1. Muscles Body Functions Affected:  1. Movement Related Activities and Participation Affected:  1.  Mobility   Number of elements that affect the Plan of Care: 3: MODERATE COMPLEXITY   CLINICAL PRESENTATION:   Presentation: Stable and uncomplicated: LOW COMPLEXITY   CLINICAL DECISION MAKIN[address] [place] [id number] AM-PAC 6 Clicks   Basic Mobility Inpatient Short Form  How much difficulty does the patient currently have. .. Unable A Lot A Little None   1. Turning over in bed (including adjusting bedclothes, sheets and blankets)? [] 1   [] 2   [x] 3   [] 4   2. Sitting down on and standing up from a chair with arms ( e.g., wheelchair, bedside commode, etc.)   [] 1   [x] 2   [] 3   [] 4   3. Moving from lying on back to sitting on the side of the bed? [] 1   [] 2   [x] 3   [] 4   How much help from another person does the patient currently need. .. Total A Lot A Little None   4. Moving to and from a bed to a chair (including a wheelchair)? [] 1   [x] 2   [] 3   [] 4   5. Need to walk in hospital room? [x] 1   [] 2   [] 3   [] 4   6. Climbing 3-5 steps with a railing? [x] 1   [] 2   [] 3   [] 4   © , Trustees of [address] [place] [address], under license to [employer]. All rights reserved      Score:  Initial: 12 Most Recent: X (Date: -- )    Interpretation of Tool:  Represents activities that are increasingly more difficult (i.e. Bed mobility, Transfers, Gait). Medical Necessity:     · Patient is expected to demonstrate progress in   · strength, balance, and coordination  ·  to   · decrease assistance required with bed mobility and transfers. · .  Reason for Services/Other Comments:  · Patient continues to require skilled intervention due to   · Weakness limiting mobility. · .   Use of outcome tool(s) and clinical judgement create a POC that gives a: Clear prediction of patient's progress: LOW COMPLEXITY            TREATMENT:   (In addition to Assessment/Re-Assessment sessions the following treatments were rendered)   Pre-treatment Symptoms/Complaints:  Patient agreeable.     Pain: Initial:   Pain Intensity 1: 0 0 Post Session:      Therapeutic Activity: (  23 Minutes ):  Therapeutic activities including bed mobility,to improve mobility, strength, balance, and coordination. Required moderate verbal and manual cues  to promote static and dynamic balance in standing and promote coordination of bilateral, lower extremity(s). Date:  11/22 Date:  11/22 pm   Date:  11/23   Date:  11/24     Activity/Exercise Parameters Parameters Parameters      Ankle pumps 10 AA  10 12     Hip abd/add 10 AA        Shoulder flex/ext 10 AA        Elbow flex/ext 10 AA        Marching in place  10 AA 10 12     LAQ   10 12                Braces/Orthotics/Lines/Etc:   · O2 Device: None (Room air)  Treatment/Session Assessment:    · Response to Treatment: making slow progress  · Interdisciplinary Collaboration:   o Physical Therapy Assistant  o Registered Nurse  · After treatment position/precautions:   o Supine in bed  o Bed/Chair-wheels locked  o Call light within reach   · Compliance with Program/Exercises: Will assess as treatment progresses  · Recommendations/Intent for next treatment session: \"Next visit will focus on advancements to more challenging activities and reduction in assistance provided\".   Total Treatment Duration:  PT Patient Time In/Time Out  Time In: 1400  Time Out: [address] [facility] KP Cuellar

## 2021-11-24 NOTE — PROGRESS NOTES
Nutrition Assessment for:   LOS    Assessment: H/O: hemorrhoids, coronary artery disease, hip fracture, osteoporosis, chronic hyponatremia, chronic cystitis, hypertension. Sent from urgent care where she c/o constipation and BRBPR. Pt reprots when she was in the NH for 2 weeks she was receiving and drinking 3 bottles of Ensure per day. She is unclear as to whether she has a change n appetite or intake. She does reprot weight loss but is unsure how much or when this occurred but says she thins she most recently weighed 106# and she weighed 140# but that was many years ago. Appetite since admission has been good and she reports she has been eating ost  To all of her meals. She is receptive to Ensure while on the full liquid diet while awaiting flex gin on 11/26. ADULT DIET Full Liquid; Nothing red    Per documentation, patient consuming average %% of 10 recorded meals in 7 days. Anthropometrics:  Height: 5' 6\" (167.6 cm), Weight: 54.4 kg (120 lb), Weight Source: Bed scale, Body mass index is 19.37 kg/m². BMI class of Underweight. Macronutrient needs: (using CBW (Current body weight) bed scale 54.4 kg kg)  EER: 7137-3000 kcal/day (25-30 kcal/kg)  EPR: 68-82 g/day (20% of kcals)      Nutrition Diagnosis:  Predicted inadequate oral intake related to altered GI function  as evidenced by need for full liquid diet which is nutrtionally inadequate without ONS. Nutrition Intervention:  Meals and snacks: Continue current diet  Medical food supplement therapy: Commercial beverage- Ensure Enlive tid  Discharge Nutrition Plan: Too soon to determine.     Miguel Villalobos, [address], [address], [address] [place] [address]

## 2021-11-24 NOTE — PROGRESS NOTES
END OF SHIFT NOTE:    Intake/Output  No intake/output data recorded. Voiding: YES  Catheter: YES  Drain:   External Urinary Catheter 11/19/21 (Active)   Site Assessment Clean, dry, & intact 11/23/21 1930   Repositioned No 11/23/21 1930   Perineal Care No 11/23/21 1930   Wick Changed No 11/23/21 1930   Suction Canister/Tubing Changed No 11/23/21 1930   Urine Output (mL) 150 ml 11/24/21 0613               Stool:  1 occurrences. Stool Assessment  Stool Color: Nancey Hams (11/24/21 4078)  Stool Appearance: Bloody (large amounts of blood) (11/24/21 2173)  Stool Amount: Medium (11/24/21 4811)  Stool Source/Status: Rectum (11/24/21 1205)    Emesis:  0   occurrences. VITAL SIGNS  Patient Vitals for the past 12 hrs:   Temp Pulse Resp BP SpO2   11/23/21 2328 97.8 °F (36.6 °C) 76 17 110/62 98 %       Pain Assessment  Pain 1  Pain Scale 1: Visual (11/24/21 0141)  Pain Intensity 1: 0 (11/24/21 0141)  Patient Stated Pain Goal: 0 (11/24/21 0141)  Pain Reassessment 1: Yes (11/23/21 2029)  Pain Onset 1: chronic (11/23/21 1929)  Pain Location 1: Leg (11/23/21 1929)  Pain Orientation 1: Mid; Lower (11/23/21 1929)  Pain Description 1: Aching (11/23/21 1929)  Pain Intervention(s) 1: Medication (see MAR) (11/23/21 1929)    Ambulating  No    Additional Information:     Shift report given to oncoming nurse at the bedside.     Hong Campos RN

## 2021-11-24 NOTE — PROGRESS NOTES
Pt is having a flexible sigmoidoscopy on Friday. Will follow for any DC needs. Will put in resumption of care orders to [facility](Haley Alvares) [phone] when ready for DC.

## 2021-11-24 NOTE — PROGRESS NOTES
Referral received from staff to visit with patient. Good conversation with patient regarding her veda and medical situation. Patient voiced her concerns regarding leaving hospital and asked for prayer. Prayer and support given. Very sweet lady! Adeel Desir M.Div, [facility] / / Bereavement Coordinator  [email or web address] [phone]/ [phone] / [place] [address] / [place], [address] [facility]  [email or web address]. [email or web address]

## 2021-11-24 NOTE — PROGRESS NOTES
[facility] Progress Note         Admit Date:  11/17/2021  Today's Date:  11/24/2021    CC:  Rectal bleeding, anemia    Subjective:     Patient feels ok. She had another BRBPR this morning. No N/V or abdominal pain. Hgb stable.      Medications:   Current Facility-Administered Medications   Medication Dose Route Frequency    [START ON 11/25/2021] cefepime (MAXIPIME) 1 g in 0.9% sodium chloride (MBP/ADV) 50 mL MBP  1 g IntraVENous Q24H    sodium chloride (OCEAN) 0.65 % nasal squeeze bottle 2 Spray  2 Spray Both Nostrils Q2H PRN    psyllium husk-aspartame (METAMUCIL FIBER) packet 1 Packet  1 Packet Oral BID    sodium chloride tablet 1 g  1 g Oral BID WITH MEALS    carboxymethylcellulose sodium (REFRESH LIQUIGEL) 1 % ophthalmic solution 1 Drop  1 Drop Both Eyes PRN    bisacodyL (DULCOLAX) suppository 10 mg  10 mg Rectal DAILY PRN    lip protectant (BLISTEX) ointment 1 Each  1 Each Topical PRN    hydrocortisone (ANUSOL-HC) suppository 25 mg  25 mg Rectal Q12H    polyethylene glycol (MIRALAX) packet 17 g  17 g Oral BID    sodium chloride (NS) flush 5-40 mL  5-40 mL IntraVENous Q8H    sodium chloride (NS) flush 5-40 mL  5-40 mL IntraVENous PRN    ondansetron (ZOFRAN ODT) tablet 4 mg  4 mg Oral Q8H PRN    Or    ondansetron (ZOFRAN) injection 4 mg  4 mg IntraVENous Q6H PRN    bisacodyL (DULCOLAX) tablet 5 mg  5 mg Oral DAILY PRN    acetaminophen (TYLENOL) tablet 650 mg  650 mg Oral Q6H PRN    Or    acetaminophen (TYLENOL) suppository 650 mg  650 mg Rectal Q6H PRN    amLODIPine (NORVASC) tablet 2.5 mg  2.5 mg Oral Q12H    escitalopram oxalate (LEXAPRO) tablet 20 mg  20 mg Oral DAILY    diclofenac (VOLTAREN) 1 % topical gel 2 g  2 g Topical QID    levothyroxine (SYNTHROID) tablet 75 mcg  75 mcg Oral ACB    LORazepam (ATIVAN) tablet 0.25 mg  0.25 mg Oral BID    promethazine (PHENERGAN) tablet 12.5 mg  12.5 mg Oral QHS PRN    traMADoL (ULTRAM) tablet 50 mg  50 mg Oral Q6H PRN Review of Systems:  ROS was obtained, with pertinent positives as listed above. No chest pain or SOB. Diet:      Objective:   Vitals:  Visit Vitals  /72 (BP 1 Location: Right upper arm, BP Patient Position: Supine)   Pulse 64   Temp 97.8 °F (36.6 °C)   Resp 12   Ht 5' 6\" (1.676 m)   Wt 54.4 kg (120 lb)   SpO2 96%   BMI 19.37 kg/m²     Intake/Output:  No intake/output data recorded. 11/22 1901 - 11/24 0700  In: 950 [P.O.:950]  Out: 2125 [Urine:2125]  Exam:  General appearance: alert, cooperative, no distress  Lungs: clear to auscultation bilaterally anteriorly  Heart: regular rate and rhythm  Abdomen: soft, non-tender. Bowel sounds normal. No masses, no organomegaly  Extremities: extremities normal, atraumatic, no cyanosis or edema  Neuro:  alert and oriented    Data Review (Labs):    Recent Labs     11/24/21  0614 11/23/21  0609 11/22/21  0616   WBC 6.0  --   --    HGB 8.6* 8.5* 8.6*   HCT 26.6* 25.4* 25.3*     --   --    .0*  --   --    *  --   --    K 4.6  --   --    CL 98  --   --    CO2 26  --   --    BUN 25*  --   --    CREA 0.88  --   --    CA 9.7  --   --    *  --   --        Assessment:     Principal Problem:  Acute blood loss anemia (11/18/2021)    Active Problems:  Lower GI bleed (11/17/2021)  MDD (major depressive disorder) (11/18/2021)  Anxiety (11/18/2021)  Hypertension (11/18/2021)  Acquired hypothyroidism (11/18/2021)    81 yo female with PMH of CAD, osteoporosis, HTN, chronic hyponatremia, chronic anxiety, hemorrhoids admitted by the hospitalist service for rectal bleeding with drop in hgb.  Etiology of bleeding includes hemorrhoids, anal fissure, and rectal mass. Constipation symptoms are reported to be chronic. GYN evaluation negative for vaginal or vulvar lesion as cause of bleeding. Patient and her family previously did not want to have invasive procedures including flexible sigmoidoscopy vs. Colonoscopy. Therefore GI signed off 11/20.   We are re consulted for continue BRRB. Hgb remains stable at 8.6 today.       Plan:     Monitor Hgb, transfuse PRN  Continue soft diet given difficulty with mastication  Continue bowel regimen of Miralax BID & fiber supplement BID  Continue Ansuol suppository BID. We have been unable to reach patient's son, Christopher Obrien x2 (LVM yesterday/this morning) to discuss flexible sigmoidoscopy for evaluation of bleeding. Patient appears to be agreeable give persistent nature of bleeding. We'll try to reach patient again. If agreeable, possible flex sig on Friday 11/26. ADDENDUM:  Spoke to Lopez Barrios @ [phone] who takes care of her at home. He had a discussion with patient last night and they felt that this is the next best course of action. Family is on board for patient to have flex sig.      On Rocephin for acute cystitis       Gwen Shahid PA-C  [facility]

## 2021-11-24 NOTE — PROGRESS NOTES
Hospitalist Progress Note   Admit Date:  2021  2:04 PM   Name:  Kenny Morocho   Age:  80 y.o. Sex:  female  :  1926   MRN:  [id number]   Room:  [facility]/    Presenting Complaint: Constipation    Reason(s) for Admission: Lower GI bleed [K92.2]  Acute blood loss anemia [D62]     Hospital Course & Interval History: This is a very nice 79 y/o WF with a h/o CAD, HTN, chronic hyponatremia who was admitted to our service on  with acute blood loss anemia and hematochezia. Hb 1 week prior to admission was in the 10s; 3 months ago was high 11. On presentation it was 9.7. She describes nothing but blood during each BM. She was admitted and started on IVFs, PPI, serial H/H GI consult. Suppository for severe constipation. No endoscopy per patient and family. Jimbo Villagran was consulted with concerns for vaginal bleeding but no sourcewas identified, patient s/p hysterectomy so likely all rectal. Hb remains stable. Working with PT/OT well but still weak. She has had ongoing rectal bleeding since admission. Subjective (21):  Patient had one episode of bleeding per rectum yesterday. No nausea vomiting or abdominal pain. Patient stated this morning she has not had a bowel movement yet. No fever no chills. Assessment & Plan:     Principal Problem: This is a 70-year-old female with    Acute blood loss anemia secondary to acute lower GI bleed  Patient with bleeding per rectum. Hemoglobin is 8.6 this morning from 8.5 yesterday. Initially patient was seen by GYN on  for suspected vaginal bleeding. No lesions noted and patient is status post hysterectomy. GI on board. Appreciate recommendations. Plans for flexible sigmoidoscopy. Timing deferred per GI. Urinary tract infection  Urine cultures positive for Morganella. Intermediate sensitive to ceftriaxone. Patient completed 3 days of ceftriaxone. Plan to do 3 days of cefepime based on sensitivity results.     Hypertension  Continue home medication    Hypothyroidism  Levothyroxine    MDD/anxiety  Lexapro, low-dose Ativan      Dispo/Discharge Planning:    Discharge plans pending. Hopefully discharge patient back home in 48 hours if medically cleared. Diet:  ADULT DIET Full Liquid; Nothing red  DVT PPx: SCDs  Code status: Full Code    Hospital Problems as of 11/24/2021 Date Reviewed: 11/18/2021          Codes Class Noted - Resolved POA    * (Principal) Acute blood loss anemia ICD-10-CM: D62  ICD-9-CM: 285.1  11/18/2021 - Present Yes        MDD (major depressive disorder) ICD-10-CM: F32.9  ICD-9-CM: 296.20  11/18/2021 - Present Yes        Anxiety ICD-10-CM: F41.9  ICD-9-CM: 300.00  11/18/2021 - Present Yes        Hypertension ICD-10-CM: I10  ICD-9-CM: 401.9  11/18/2021 - Present Yes        Acquired hypothyroidism ICD-10-CM: E03.9  ICD-9-CM: 244.9  11/18/2021 - Present Yes        Lower GI bleed ICD-10-CM: K92.2  ICD-9-CM: 578.9  11/17/2021 - Present Yes              Objective:     Patient Vitals for the past 24 hrs:   Temp Pulse Resp BP SpO2   11/24/21 1141 98.3 °F (36.8 °C) 62 18 120/69 99 %   11/24/21 0723 97.8 °F (36.6 °C) 64 12 122/72 96 %   11/23/21 2328 97.8 °F (36.6 °C) 76 17 110/62 98 %   11/23/21 1946 97.9 °F (36.6 °C) 82 20 (!) 105/56 97 %   11/23/21 1540 97.7 °F (36.5 °C) 67 17 (!) 103/52 96 %     Oxygen Therapy  O2 Sat (%): 99 % (11/24/21 1141)  Pulse via Oximetry: 67 beats per minute (11/19/21 0745)  O2 Device: None (Room air) (11/24/21 0931)    Estimated body mass index is 19.37 kg/m² as calculated from the following:    Height as of this encounter: 5' 6\" (1.676 m). Weight as of this encounter: 54.4 kg (120 lb). Intake/Output Summary (Last 24 hours) at 11/24/2021 1434  Last data filed at 11/24/2021 7952  Gross per 24 hour   Intake 420 ml   Output 750 ml   Net -330 ml         Physical Exam:     Blood pressure 120/69, pulse 62, temperature 98.3 °F (36.8 °C), resp. rate 18, height 5' 6\" (1.676 m), weight 54.4 kg (120 lb), SpO2 99 %. General:    Elderly female, no overt distress  Head:  Normocephalic, atraumatic  Eyes:  Sclerae appear normal.  Pupils equally round. ENT:  Nares appear normal, no drainage. Moist oral mucosa  Neck:  No restricted ROM. Trachea midline   CV:   RRR. No m/r/g. No jugular venous distension. Lungs:   CTAB. No wheezing, rhonchi, or rales. Respirations even, unlabored  Abdomen: Bowel sounds present. Soft, nontender, nondistended. Extremities: No cyanosis or clubbing. No edema  Skin:     No rashes and normal coloration. Warm and dry. Neuro:  CN II-XII grossly intact. Sensation intact. A&Ox3  Psych:  Normal mood and affect.       I have reviewed ordered lab tests and independently visualized imaging below:    Recent Labs:  Recent Results (from the past 48 hour(s))   HGB & HCT    Collection Time: 11/23/21  6:09 AM   Result Value Ref Range    HGB 8.5 (L) 11.7 - 15.4 g/dL    HCT 25.4 (L) 35.8 - 46.3 %   CBC W/O DIFF    Collection Time: 11/24/21  6:14 AM   Result Value Ref Range    WBC 6.0 4.3 - 11.1 K/uL    RBC 2.66 (L) 4.05 - 5.2 M/uL    HGB 8.6 (L) 11.7 - 15.4 g/dL    HCT 26.6 (L) 35.8 - 46.3 %    .0 (H) 79.6 - 97.8 FL    MCH 32.3 26.1 - 32.9 PG    MCHC 32.3 31.4 - 35.0 g/dL    RDW 14.6 11.9 - 14.6 %    PLATELET 465 219 - 414 K/uL    MPV 9.0 (L) 9.4 - 12.3 FL    ABSOLUTE NRBC 0.00 0.0 - 0.2 K/uL   METABOLIC PANEL, BASIC    Collection Time: 11/24/21  6:14 AM   Result Value Ref Range    Sodium 130 (L) 136 - 145 mmol/L    Potassium 4.6 3.5 - 5.1 mmol/L    Chloride 98 98 - 107 mmol/L    CO2 26 21 - 32 mmol/L    Anion gap 6 (L) 7 - 16 mmol/L    Glucose 102 (H) 65 - 100 mg/dL    BUN 25 (H) 8 - 23 MG/DL    Creatinine 0.88 0.6 - 1.0 MG/DL    GFR est AA >60 >60 ml/min/1.73m2    GFR est non-AA >60 >60 ml/min/1.73m2    Calcium 9.7 8.3 - 10.4 MG/DL       All Micro Results     Procedure Component Value Units Date/Time    CULTURE, URINE [[id number]]  (Abnormal)  (Susceptibility) Collected: 11/21/21 0427 Order Status: Completed Specimen: Urine from Clean catch Updated: 11/24/21 0649     Special Requests: NO SPECIAL REQUESTS        Culture result:       >100,000 COLONIES/mL MORGANELLA MORGANII                  >100,000 COLONIES/mL MIXED SKIN LONNIE ISOLATED          COVID-19 RAPID TEST [[id number]] Collected: 11/17/21 1930    Order Status: Completed Specimen: Nasopharyngeal Updated: 11/17/21 2011     Specimen source NASAL SWAB        COVID-19 rapid test Not detected        Comment:      The specimen is NEGATIVE for SARS-CoV-2, the novel coronavirus associated with COVID-19. A negative result does not rule out COVID-19. This test has been authorized by the FDA under an Emergency Use Authorization (EUA) for use by authorized laboratories. Fact sheet for Healthcare Providers: iTendency.uy  Fact sheet for Patients: iTendency.uy       Methodology: Isothermal Nucleic Acid Amplification               Other Studies:  No results found.     Current Meds:  Current Facility-Administered Medications   Medication Dose Route Frequency    [START ON 11/25/2021] cefepime (MAXIPIME) 1 g in 0.9% sodium chloride (MBP/ADV) 50 mL MBP  1 g IntraVENous Q24H    sodium chloride (OCEAN) 0.65 % nasal squeeze bottle 2 Spray  2 Spray Both Nostrils Q2H PRN    psyllium husk-aspartame (METAMUCIL FIBER) packet 1 Packet  1 Packet Oral BID    sodium chloride tablet 1 g  1 g Oral BID WITH MEALS    carboxymethylcellulose sodium (REFRESH LIQUIGEL) 1 % ophthalmic solution 1 Drop  1 Drop Both Eyes PRN    bisacodyL (DULCOLAX) suppository 10 mg  10 mg Rectal DAILY PRN    lip protectant (BLISTEX) ointment 1 Each  1 Each Topical PRN    hydrocortisone (ANUSOL-HC) suppository 25 mg  25 mg Rectal Q12H    polyethylene glycol (MIRALAX) packet 17 g  17 g Oral BID    sodium chloride (NS) flush 5-40 mL  5-40 mL IntraVENous Q8H    sodium chloride (NS) flush 5-40 mL  5-40 mL IntraVENous PRN  ondansetron (ZOFRAN ODT) tablet 4 mg  4 mg Oral Q8H PRN    Or    ondansetron (ZOFRAN) injection 4 mg  4 mg IntraVENous Q6H PRN    bisacodyL (DULCOLAX) tablet 5 mg  5 mg Oral DAILY PRN    acetaminophen (TYLENOL) tablet 650 mg  650 mg Oral Q6H PRN    Or    acetaminophen (TYLENOL) suppository 650 mg  650 mg Rectal Q6H PRN    amLODIPine (NORVASC) tablet 2.5 mg  2.5 mg Oral Q12H    escitalopram oxalate (LEXAPRO) tablet 20 mg  20 mg Oral DAILY    diclofenac (VOLTAREN) 1 % topical gel 2 g  2 g Topical QID    levothyroxine (SYNTHROID) tablet 75 mcg  75 mcg Oral ACB    LORazepam (ATIVAN) tablet 0.25 mg  0.25 mg Oral BID    promethazine (PHENERGAN) tablet 12.5 mg  12.5 mg Oral QHS PRN    traMADoL (ULTRAM) tablet 50 mg  50 mg Oral Q6H PRN       Signed:  Jacob Rich MD    Part of this note may have been written by using a voice dictation software. The note has been proof read but may still contain some grammatical/other typographical errors.

## 2021-11-25 PROBLEM — E87.1 HYPONATREMIA: Status: ACTIVE | Noted: 2021-11-25

## 2021-11-25 LAB
ANION GAP SERPL CALC-SCNC: 4 MMOL/L (ref 7–16)
BUN SERPL-MCNC: 19 MG/DL (ref 8–23)
CALCIUM SERPL-MCNC: 9.2 MG/DL (ref 8.3–10.4)
CHLORIDE SERPL-SCNC: 97 MMOL/L (ref 98–107)
CO2 SERPL-SCNC: 28 MMOL/L (ref 21–32)
CREAT SERPL-MCNC: 0.62 MG/DL (ref 0.6–1)
ERYTHROCYTE [DISTWIDTH] IN BLOOD BY AUTOMATED COUNT: 14.6 % (ref 11.9–14.6)
GLUCOSE SERPL-MCNC: 84 MG/DL (ref 65–100)
HCT VFR BLD AUTO: 23 % (ref 35.8–46.3)
HCT VFR BLD AUTO: 23.1 % (ref 35.8–46.3)
HGB BLD-MCNC: 7.6 G/DL (ref 11.7–15.4)
HGB BLD-MCNC: 7.6 G/DL (ref 11.7–15.4)
MCH RBC QN AUTO: 33.2 PG (ref 26.1–32.9)
MCHC RBC AUTO-ENTMCNC: 32.9 G/DL (ref 31.4–35)
MCV RBC AUTO: 100.9 FL (ref 79.6–97.8)
NRBC # BLD: 0 K/UL (ref 0–0.2)
PLATELET # BLD AUTO: 353 K/UL (ref 150–450)
PMV BLD AUTO: 9.5 FL (ref 9.4–12.3)
POTASSIUM SERPL-SCNC: 4.3 MMOL/L (ref 3.5–5.1)
RBC # BLD AUTO: 2.29 M/UL (ref 4.05–5.2)
SODIUM SERPL-SCNC: 129 MMOL/L (ref 136–145)
WBC # BLD AUTO: 5.7 K/UL (ref 4.3–11.1)

## 2021-11-25 PROCEDURE — 65270000029 HC RM PRIVATE

## 2021-11-25 PROCEDURE — 74011250637 HC RX REV CODE- 250/637: Performed by: INTERNAL MEDICINE

## 2021-11-25 PROCEDURE — 80048 BASIC METABOLIC PNL TOTAL CA: CPT

## 2021-11-25 PROCEDURE — 85018 HEMOGLOBIN: CPT

## 2021-11-25 PROCEDURE — 74011000258 HC RX REV CODE- 258: Performed by: HOSPITALIST

## 2021-11-25 PROCEDURE — 74011250636 HC RX REV CODE- 250/636: Performed by: HOSPITALIST

## 2021-11-25 PROCEDURE — 85027 COMPLETE CBC AUTOMATED: CPT

## 2021-11-25 PROCEDURE — 36415 COLL VENOUS BLD VENIPUNCTURE: CPT

## 2021-11-25 PROCEDURE — 97530 THERAPEUTIC ACTIVITIES: CPT

## 2021-11-25 RX ORDER — SODIUM CHLORIDE 9 MG/ML
75 INJECTION, SOLUTION INTRAVENOUS CONTINUOUS
Status: DISCONTINUED | OUTPATIENT
Start: 2021-11-25 | End: 2021-11-26 | Stop reason: HOSPADM

## 2021-11-25 RX ADMIN — TRAMADOL HYDROCHLORIDE 50 MG: 50 TABLET, COATED ORAL at 20:40

## 2021-11-25 RX ADMIN — CEFEPIME HYDROCHLORIDE 1 G: 1 INJECTION, POWDER, FOR SOLUTION INTRAMUSCULAR; INTRAVENOUS at 08:13

## 2021-11-25 RX ADMIN — POLYETHYLENE GLYCOL 3350 17 G: 17 POWDER, FOR SOLUTION ORAL at 08:13

## 2021-11-25 RX ADMIN — PSYLLIUM HUSK 1 PACKET: 3.4 POWDER ORAL at 17:00

## 2021-11-25 RX ADMIN — PSYLLIUM HUSK 1 PACKET: 3.4 POWDER ORAL at 08:13

## 2021-11-25 RX ADMIN — Medication 10 ML: at 21:57

## 2021-11-25 RX ADMIN — HYDROCORTISONE ACETATE 25 MG: 25 SUPPOSITORY RECTAL at 08:22

## 2021-11-25 RX ADMIN — ESCITALOPRAM OXALATE 20 MG: 10 TABLET ORAL at 08:07

## 2021-11-25 RX ADMIN — LEVOTHYROXINE SODIUM 75 MCG: 75 TABLET ORAL at 06:15

## 2021-11-25 RX ADMIN — DICLOFENAC SODIUM 2 G: 10 GEL TOPICAL at 08:14

## 2021-11-25 RX ADMIN — Medication 10 ML: at 06:15

## 2021-11-25 RX ADMIN — DICLOFENAC SODIUM 2 G: 10 GEL TOPICAL at 22:00

## 2021-11-25 RX ADMIN — LORAZEPAM 0.25 MG: 0.5 TABLET ORAL at 08:07

## 2021-11-25 RX ADMIN — TRAMADOL HYDROCHLORIDE 50 MG: 50 TABLET, COATED ORAL at 08:07

## 2021-11-25 RX ADMIN — SODIUM CHLORIDE 75 ML/HR: 900 INJECTION, SOLUTION INTRAVENOUS at 11:35

## 2021-11-25 RX ADMIN — Medication 10 ML: at 13:58

## 2021-11-25 RX ADMIN — TRAMADOL HYDROCHLORIDE 50 MG: 50 TABLET, COATED ORAL at 14:35

## 2021-11-25 RX ADMIN — Medication 1 G: at 08:07

## 2021-11-25 RX ADMIN — POLYETHYLENE GLYCOL 3350 17 G: 17 POWDER, FOR SOLUTION ORAL at 17:00

## 2021-11-25 RX ADMIN — HYDROCORTISONE ACETATE 25 MG: 25 SUPPOSITORY RECTAL at 20:40

## 2021-11-25 RX ADMIN — DICLOFENAC SODIUM 2 G: 10 GEL TOPICAL at 13:00

## 2021-11-25 RX ADMIN — Medication 1 G: at 17:00

## 2021-11-25 RX ADMIN — DICLOFENAC SODIUM 2 G: 10 GEL TOPICAL at 18:00

## 2021-11-25 RX ADMIN — LORAZEPAM 0.25 MG: 0.5 TABLET ORAL at 17:00

## 2021-11-25 NOTE — PROGRESS NOTES
EOS    - prn po tramadol given x1  - no signs of rectal bleeding throughout night  - pt resting quietly, vss, no needs voiced  - report given to oncoming nurse

## 2021-11-25 NOTE — PROGRESS NOTES
Problem: Mobility Impaired (Adult and Pediatric)  Goal: *Acute Goals and Plan of Care (Insert Text)  Outcome: Progressing Towards Goal  Note:   LTG:  (1.)Ms. Pacheco Saleh will move from sit to supine  in bed with STAND BY ASSIST within 5 treatment day(s). (2.)Ms. Pacheco Saleh will transfer from bed to chair and chair to bed with MODERATE ASSIST using the least restrictive device within 5 treatment day(s). ________________________________________________________________________________________________       PHYSICAL THERAPY: Daily Note and AM 11/25/2021  INPATIENT: PT Visit Days : 6  [place] / [place] / Product Type: Medicare /       NAME/AGE/GENDER: Abril Hernandez is a 80 y.o. female   PRIMARY DIAGNOSIS: Lower GI bleed [K92.2]  Acute blood loss anemia [D62] Acute blood loss anemia Acute blood loss anemia       ICD-10: Treatment Diagnosis:    · Generalized Muscle Weakness (M62.81)  · Abnormal posture (R29.3)   Precaution/Allergies:  Patient has no known allergies. ASSESSMENT:     Ms. Pacheco Saleh presents with above diagnoses. Patient demonstrates weakness throughout affecting her balance and mobility. She uses a wheelchair for mobility and has assist for all transfers. Patient would benefit from therapy to ensure ability to continue to transfer at home. She has assist from her grandson and his spouse and has homecare services. Patient should return home at d/c with resumption of services. Patient is upset about her current living situation and current medical situation. She feels she is a burden on her family and she feels ready to go to heaven. Comforted patient and asked SW to put in a  consult. Patient very upset that the doctor may send her home and she is still bleeding on the toilet. She was able to perform a stand pivot transfer between the bed and [facility] but required max A. She did have a good bit of blood in the toilet and on the wipes.   CNA provided hygiene and to discuss with RN. Continue to plan for patient to return home with family and homecare services. 11/22 supine upon contact. Work on rolling from L><R x 4 to get clean up. Then work on B UE/LE exercises with AA. Then left in supine with needs in reach and instructed to call for assistance. 11/22 mid morning  Supine upon arrival.  Work on bed mobility as follows:supine>EOB with Mod-Min A x 2 with (extra time). Sitting on the EOB working on her balance such as [address] through B UE, while exercising B LE with guidance. Then sit>stand with Max A x 2 with verbal cues to stand tall. Then return to supine with Max A x 2 with needs in reach and instructed to call for assistance. 11/23 supine upon arrival.  Work on bed mobility as follows:supine>eob with Min A-Mod A x 2 (extra time). While on the eob performs B LE exercises with guidance while working on balance. Stated I need to go to the restroom. Then stood with OT & PT Max A and transfer over to the [facility]. Pt could not stand up long enough for OT to clean pt. OT & PT put her back in the bed with Max A. Then rolled her from L><R x 4 to get clean up. Then reposition her in the bed with needs in reach and instructed her to call for assistance. Pt is a good co-tx, because pt fatigue quickly and needs a lot of assistance. 11/24 supine upon arrival.  Performs bed mobility as follows:supine>eob with Min A. While sitting on the eob, performs B LE exercises with guidance. Return back to bed with Min A. Left in supine with needs in reach and instructed to call for assistance. 11/25 supine upon arrival.  Performs B LE exercises in the bed with guidance. Then work on bed mobility as follows:supine>eob with min A. Sat on eob and performs more B UE/LE exercises, while  working on maintain her balance. Return back to supine with Min A, needs in reach and instructed to call for assistance.   This section established at most recent assessment PROBLEM LIST (Impairments causing functional limitations):  1. Decreased Strength  2. Decreased ADL/Functional Activities  3. Decreased Transfer Abilities  4. Decreased Balance   INTERVENTIONS PLANNED: (Benefits and precautions of physical therapy have been discussed with the patient.)  1. Balance Exercise  2. Bed Mobility  3. Family Education  4. Therapeutic Activites  5. Therapeutic Exercise/Strengthening  6. Transfer Training     TREATMENT PLAN: Frequency/Duration: daily for duration of hospital stay  Rehabilitation Potential For Stated Goals: Good     REHAB RECOMMENDATIONS (at time of discharge pending progress):    Placement: It is my opinion, based on this patient's performance to date, that Ms. Annabella Fletcher may benefit from [address] after discharge due to the functional deficits listed above that are likely to improve with skilled rehabilitation because he/she has multiple medical issues that affect his/her functional mobility in the community. Equipment:    None at this time              HISTORY:   History of Present Injury/Illness (Reason for Referral):  Carlos Perkins is a 80 y.o. female with medical history of hemorrhoids, coronary artery disease, hip fracture, osteoporosis, chronic hyponatremia, chronic cystitis, hypertension who presents to the emergency department after being evaluated at [facility] emergency center for the patient was complaining of constant need for having to have a bowel movement and when she does this she has no evacuation of feces and has bright red blood. Apparently at [facility] emergency department emergency room physician attempted to have the patient passed a bowel movement indeed did not have any fecal episode but did have a large bright red blood per rectum episode. Subsequently the patient was sent to our emergency department for further evaluation and treatment.   At the time of my evaluation of the patient patient has become significantly agitated while she is oriented to person place and time as well as being able to tell me that she has been constipated for a week but has only been able to pass bright red blood she cannot comprehend why she has to be admitted to the hospital and she further is tearful and a poor historian and she is very agitated. Much of the information obtained in my history and physical was obtained from prior medical records and outside medical records. Course of action in the emergency department-KUB was obtained that demonstrated no acute process. H&H was obtained that demonstrated hemoglobin 9.6 hematocrit of 28.9.. BMP demonstrated a serum sodium of 134 potassium 3.8 chloride of 96 CO2 of 28 anion gap of 10 BUN of 17 creatinine of 0.7. Outside medical records were reviewed and patient has a baseline hemoglobin approximately 11. Secondary to 3 g drop in patient's hemoglobin hematocrit the decision was made to admit the patient for further evaluation and treatment. Past Medical History/Comorbidities:   Ms. Zak Zhu  has no past medical history on file. Ms. Zak Zhu  has no past surgical history on file.   Social History/Living Environment:   Home Environment: Private residence  One/Two Story Residence: One story  Living Alone: No  Support Systems: Other Family Member(s)  Patient Expects to be Discharged to[de-identified] House  Current DME Used/Available at Home: Wheelchair  Prior Level of Function/Work/Activity:  Wheelchair for mobility and assist for transfers     Number of Personal Factors/Comorbidities that affect the Plan of Care: 1-2: MODERATE COMPLEXITY   EXAMINATION:   Most Recent Physical Functioning:   Gross Assessment:                  Posture:     Balance:  Sitting: Impaired  Sitting - Static: Fair (occasional)  Sitting - Dynamic: Fair (occasional)  Standing: Impaired Bed Mobility:  Rolling: Minimum assistance  Supine to Sit: Minimum assistance  Sit to Supine: Minimum assistance  Wheelchair Mobility:     Transfers:  Duration: 30 Minutes  Gait:            Body Structures Involved:  1. Muscles Body Functions Affected:  1. Movement Related Activities and Participation Affected:  1. Mobility   Number of elements that affect the Plan of Care: 3: MODERATE COMPLEXITY   CLINICAL PRESENTATION:   Presentation: Stable and uncomplicated: LOW COMPLEXITY   CLINICAL DECISION MAKIN [facility] [address] [id number] AM-PAC 6 Clicks   Basic Mobility Inpatient Short Form  How much difficulty does the patient currently have. .. Unable A Lot A Little None   1. Turning over in bed (including adjusting bedclothes, sheets and blankets)? [] 1   [] 2   [x] 3   [] 4   2. Sitting down on and standing up from a chair with arms ( e.g., wheelchair, bedside commode, etc.)   [] 1   [x] 2   [] 3   [] 4   3. Moving from lying on back to sitting on the side of the bed? [] 1   [] 2   [x] 3   [] 4   How much help from another person does the patient currently need. .. Total A Lot A Little None   4. Moving to and from a bed to a chair (including a wheelchair)? [] 1   [x] 2   [] 3   [] 4   5. Need to walk in hospital room? [x] 1   [] 2   [] 3   [] 4   6. Climbing 3-5 steps with a railing? [x] 1   [] 2   [] 3   [] 4   © , Trustees of [address] [place] [address] [address] [id number], under license to [employer]. All rights reserved      Score:  Initial: 12 Most Recent: X (Date: -- )    Interpretation of Tool:  Represents activities that are increasingly more difficult (i.e. Bed mobility, Transfers, Gait). Medical Necessity:     · Patient is expected to demonstrate progress in   · strength, balance, and coordination  ·  to   · decrease assistance required with bed mobility and transfers. · .  Reason for Services/Other Comments:  · Patient continues to require skilled intervention due to   · Weakness limiting mobility.   · .   Use of outcome tool(s) and clinical judgement create a POC that gives a: Clear prediction of patient's progress: LOW COMPLEXITY            TREATMENT:   (In addition to Assessment/Re-Assessment sessions the following treatments were rendered)   Pre-treatment Symptoms/Complaints:  Patient agreeable. Pain: Initial:   Pain Intensity 1: 0 0 Post Session:      Therapeutic Activity: (  30 Minutes ):  Therapeutic activities including bed mobility,to improve mobility, strength, balance, and coordination. Required moderate verbal and manual cues  to promote static and dynamic balance in standing and promote coordination of bilateral, lower extremity(s). Date:  11/22 Date:  11/22 pm   Date:  11/23   Date:  11/24 Date:  11/25    Activity/Exercise Parameters Parameters Parameters      Ankle pumps 10 AA  10 12 12 b    Hip abd/add 10 AA    12 b    Shoulder flex/ext 10 AA    12 b sitting    Elbow flex/ext 10 AA    12 b sitting    Marching in place  10 AA 10 12 12 b sitting    LAQ   10 12 12 b sitting               Braces/Orthotics/Lines/Etc:   · O2 Device: None (Room air)  Treatment/Session Assessment:    · Response to Treatment: participated well, very pleasant to work with   · Interdisciplinary Collaboration:   o Physical Therapy Assistant  o Registered Nurse  · After treatment position/precautions:   o Supine in bed  o Bed/Chair-wheels locked  o Call light within reach   · Compliance with Program/Exercises: Will assess as treatment progresses  · Recommendations/Intent for next treatment session: \"Next visit will focus on advancements to more challenging activities and reduction in assistance provided\".   Total Treatment Duration:  PT Patient Time In/Time Out  Time In: 0715  Time Out: 4061    Cathy Cuellar, PTA

## 2021-11-25 NOTE — PROGRESS NOTES
Hospitalist Progress Note   Admit Date:  2021  2:04 PM   Name:  Taryn Leiva   Age:  80 y.o. Sex:  female  :  1926   MRN:  [id number]   Room:  [facility]/    Presenting Complaint: Constipation    Reason(s) for Admission: Lower GI bleed [K92.2]  Acute blood loss anemia [D62]     Hospital Course & Interval History: This is a very nice 79 y/o WF with a h/o CAD, HTN, chronic hyponatremia who was admitted to our service on  with acute blood loss anemia and hematochezia. Hb 1 week prior to admission was in the 10s; 3 months ago was high 11. On presentation it was 9.7. She describes nothing but blood during each BM. She was admitted and started on IVFs, PPI, serial H/H GI consult. Suppository for severe constipation. No endoscopy per patient and family. [facility] was consulted with concerns for vaginal bleeding but no sourcewas identified, patient s/p hysterectomy so likely all rectal. Hb remains stable. Working with PT/OT well but still weak. She has had ongoing rectal bleeding since admission. Subjective (21):  Patient reports no bowel movement this morning. Hemoglobin dropped to 7.6 this morning from 8.6 yesterday. No fever no chills. No abdominal pain. No nausea no vomiting. Assessment & Plan:     Principal Problem: This is a 60-year-old female with    Acute blood loss anemia secondary to acute lower GI bleed  Patient with bleeding per rectum. Initially patient was seen by GYN on  for suspected vaginal bleeding. No lesions noted and patient is status post hysterectomy. GI on board. Appreciate recommendations.  hemoglobin dropped to 7.6 this morning from 8.6 yesterday. Plan to transfuse if hemoglobin less than 7. Recheck H&H this afternoon. GI plans for sigmoidoscopy tomorrow. N.p.o. after midnight. Urinary tract infection  Urine cultures positive for Morganella. Continue cefepime day 2/3. Hyponatremia  Sodium of 129 this morning. Looks chronic. Baseline seems to be 132-134. Gentle hydration with IV fluids. Repeat BMP in a.m. Hypertension  Continue home medications amlodipine. Hypothyroidism  Levothyroxine    MDD/anxiety  Lexapro, low-dose Ativan. Dispo/Discharge Planning:    Discharge plans pending. Hopefully discharge patient back home with home health in 48 hours if medically cleared. Diet:  ADULT DIET Full Liquid;  Nothing red  ADULT ORAL NUTRITION SUPPLEMENT Breakfast, Lunch, Dinner; Standard High Calorie/High Protein  DIET NPO  DVT PPx: SCDs  Code status: Full Code    Hospital Problems as of 11/25/2021 Date Reviewed: 11/18/2021          Codes Class Noted - Resolved POA    Hyponatremia ICD-10-CM: E87.1  ICD-9-CM: 276.1  11/25/2021 - Present Unknown        * (Principal) Acute blood loss anemia ICD-10-CM: D62  ICD-9-CM: 285.1  11/18/2021 - Present Yes        MDD (major depressive disorder) ICD-10-CM: F32.9  ICD-9-CM: 296.20  11/18/2021 - Present Yes        Anxiety ICD-10-CM: F41.9  ICD-9-CM: 300.00  11/18/2021 - Present Yes        Hypertension ICD-10-CM: I10  ICD-9-CM: 401.9  11/18/2021 - Present Yes        Acquired hypothyroidism ICD-10-CM: E03.9  ICD-9-CM: 244.9  11/18/2021 - Present Yes        Lower GI bleed ICD-10-CM: K92.2  ICD-9-CM: 578.9  11/17/2021 - Present Yes              Objective:     Patient Vitals for the past 24 hrs:   Temp Pulse Resp BP SpO2   11/25/21 0730 97.8 °F (36.6 °C) 69 18 (!) 108/55 98 %   11/25/21 0324 98.3 °F (36.8 °C) 98 18 122/68 94 %   11/24/21 2258 98.2 °F (36.8 °C) 100 17 113/60 96 %   11/24/21 1953 97.1 °F (36.2 °C) 78 20 113/72 96 %   11/24/21 1534 97.9 °F (36.6 °C)  16 112/64 98 %   11/24/21 1141 98.3 °F (36.8 °C) 62 18 120/69 99 %     Oxygen Therapy  O2 Sat (%): 98 % (11/25/21 0730)  Pulse via Oximetry: 67 beats per minute (11/19/21 0745)  O2 Device: None (Room air) (11/25/21 0730)    Estimated body mass index is 19.37 kg/m² as calculated from the following:    Height as of this encounter: 5' 6\" (1.676 m). Weight as of this encounter: 54.4 kg (120 lb). Intake/Output Summary (Last 24 hours) at 11/25/2021 1023  Last data filed at 11/25/2021 0622  Gross per 24 hour   Intake 650 ml   Output 1250 ml   Net -600 ml         Physical Exam:     Blood pressure (!) 108/55, pulse 69, temperature 97.8 °F (36.6 °C), resp. rate 18, height 5' 6\" (1.676 m), weight 54.4 kg (120 lb), SpO2 98 %. General:    Elderly female, no overt distress  Head:  Normocephalic, atraumatic  Eyes:  Pallor, no jaundice, Pupils equally round. ENT:  Nares appear normal, no drainage. Moist oral mucosa  Neck:  No restricted ROM. Trachea midline   CV:   RRR. No m/r/g. No jugular venous distension. Lungs:   CTAB. No wheezing, rhonchi, or rales. Respirations even, unlabored  Abdomen: Bowel sounds present. Soft, nontender, nondistended. Extremities: No cyanosis or clubbing. No edema  Skin:     No rashes and normal coloration. Warm and dry. Neuro:  CN II-XII grossly intact. Sensation intact. A&Ox3  Psych:  Normal mood and affect.       I have reviewed ordered lab tests and independently visualized imaging below:    Recent Labs:  Recent Results (from the past 48 hour(s))   CBC W/O DIFF    Collection Time: 11/24/21  6:14 AM   Result Value Ref Range    WBC 6.0 4.3 - 11.1 K/uL    RBC 2.66 (L) 4.05 - 5.2 M/uL    HGB 8.6 (L) 11.7 - 15.4 g/dL    HCT 26.6 (L) 35.8 - 46.3 %    .0 (H) 79.6 - 97.8 FL    MCH 32.3 26.1 - 32.9 PG    MCHC 32.3 31.4 - 35.0 g/dL    RDW 14.6 11.9 - 14.6 %    PLATELET 118 674 - 164 K/uL    MPV 9.0 (L) 9.4 - 12.3 FL    ABSOLUTE NRBC 0.00 0.0 - 0.2 K/uL   METABOLIC PANEL, BASIC    Collection Time: 11/24/21  6:14 AM   Result Value Ref Range    Sodium 130 (L) 136 - 145 mmol/L    Potassium 4.6 3.5 - 5.1 mmol/L    Chloride 98 98 - 107 mmol/L    CO2 26 21 - 32 mmol/L    Anion gap 6 (L) 7 - 16 mmol/L    Glucose 102 (H) 65 - 100 mg/dL    BUN 25 (H) 8 - 23 MG/DL    Creatinine 0.88 0.6 - 1.0 MG/DL    GFR est AA >60 >60 ml/min/1.73m2    GFR est non-AA >60 >60 ml/min/1.73m2    Calcium 9.7 8.3 - 10.4 MG/DL   CBC W/O DIFF    Collection Time: 11/25/21  7:51 AM   Result Value Ref Range    WBC 5.7 4.3 - 11.1 K/uL    RBC 2.29 (L) 4.05 - 5.2 M/uL    HGB 7.6 (L) 11.7 - 15.4 g/dL    HCT 23.1 (L) 35.8 - 46.3 %    .9 (H) 79.6 - 97.8 FL    MCH 33.2 (H) 26.1 - 32.9 PG    MCHC 32.9 31.4 - 35.0 g/dL    RDW 14.6 11.9 - 14.6 %    PLATELET 784 617 - 304 K/uL    MPV 9.5 9.4 - 12.3 FL    ABSOLUTE NRBC 0.00 0.0 - 0.2 K/uL   METABOLIC PANEL, BASIC    Collection Time: 11/25/21  7:51 AM   Result Value Ref Range    Sodium 129 (L) 136 - 145 mmol/L    Potassium 4.3 3.5 - 5.1 mmol/L    Chloride 97 (L) 98 - 107 mmol/L    CO2 28 21 - 32 mmol/L    Anion gap 4 (L) 7 - 16 mmol/L    Glucose 84 65 - 100 mg/dL    BUN 19 8 - 23 MG/DL    Creatinine 0.62 0.6 - 1.0 MG/DL    GFR est AA >60 >60 ml/min/1.73m2    GFR est non-AA >60 >60 ml/min/1.73m2    Calcium 9.2 8.3 - 10.4 MG/DL       All Micro Results     Procedure Component Value Units Date/Time    CULTURE, URINE [[id number]]  (Abnormal)  (Susceptibility) Collected: 11/21/21 0423    Order Status: Completed Specimen: Urine from Clean catch Updated: 11/24/21 0649     Special Requests: NO SPECIAL REQUESTS        Culture result:       >100,000 COLONIES/mL MORGANELLA MORGANII                  >100,000 COLONIES/mL MIXED SKIN LONNIE ISOLATED          COVID-19 RAPID TEST [[id number]] Collected: 11/17/21 1930    Order Status: Completed Specimen: Nasopharyngeal Updated: 11/17/21 2011     Specimen source NASAL SWAB        COVID-19 rapid test Not detected        Comment:      The specimen is NEGATIVE for SARS-CoV-2, the novel coronavirus associated with COVID-19. A negative result does not rule out COVID-19. This test has been authorized by the FDA under an Emergency Use Authorization (EUA) for use by authorized laboratories.         Fact sheet for Healthcare Providers: ConventionUpdate.co.nz  Fact sheet for Patients: ConventionUpdate.co.nz       Methodology: Isothermal Nucleic Acid Amplification               Other Studies:  No results found.     Current Meds:  Current Facility-Administered Medications   Medication Dose Route Frequency    0.9% sodium chloride infusion  75 mL/hr IntraVENous CONTINUOUS    cefepime (MAXIPIME) 1 g in 0.9% sodium chloride (MBP/ADV) 50 mL MBP  1 g IntraVENous Q24H    sodium chloride (OCEAN) 0.65 % nasal squeeze bottle 2 Spray  2 Spray Both Nostrils Q2H PRN    psyllium husk-aspartame (METAMUCIL FIBER) packet 1 Packet  1 Packet Oral BID    sodium chloride tablet 1 g  1 g Oral BID WITH MEALS    carboxymethylcellulose sodium (REFRESH LIQUIGEL) 1 % ophthalmic solution 1 Drop  1 Drop Both Eyes PRN    bisacodyL (DULCOLAX) suppository 10 mg  10 mg Rectal DAILY PRN    lip protectant (BLISTEX) ointment 1 Each  1 Each Topical PRN    hydrocortisone (ANUSOL-HC) suppository 25 mg  25 mg Rectal Q12H    polyethylene glycol (MIRALAX) packet 17 g  17 g Oral BID    sodium chloride (NS) flush 5-40 mL  5-40 mL IntraVENous Q8H    sodium chloride (NS) flush 5-40 mL  5-40 mL IntraVENous PRN    ondansetron (ZOFRAN ODT) tablet 4 mg  4 mg Oral Q8H PRN    Or    ondansetron (ZOFRAN) injection 4 mg  4 mg IntraVENous Q6H PRN    bisacodyL (DULCOLAX) tablet 5 mg  5 mg Oral DAILY PRN    acetaminophen (TYLENOL) tablet 650 mg  650 mg Oral Q6H PRN    Or    acetaminophen (TYLENOL) suppository 650 mg  650 mg Rectal Q6H PRN    amLODIPine (NORVASC) tablet 2.5 mg  2.5 mg Oral Q12H    escitalopram oxalate (LEXAPRO) tablet 20 mg  20 mg Oral DAILY    diclofenac (VOLTAREN) 1 % topical gel 2 g  2 g Topical QID    levothyroxine (SYNTHROID) tablet 75 mcg  75 mcg Oral ACB    LORazepam (ATIVAN) tablet 0.25 mg  0.25 mg Oral BID    promethazine (PHENERGAN) tablet 12.5 mg  12.5 mg Oral QHS PRN    traMADoL (ULTRAM) tablet 50 mg  50 mg Oral Q6H PRN Signed:  Lawyer Chas MD    Part of this note may have been written by using a voice dictation software. The note has been proof read but may still contain some grammatical/other typographical errors.

## 2021-11-25 NOTE — PROGRESS NOTES
GASTROENTEROLOGY    Patient and her family agree to flex sig in AM to evaluate rectal bleeding. Anemia with stable Hgb. NPO after midnight. Will arrange for enemas in AM to prepare for procedure.     Will follow    Jg Crenshaw MD

## 2021-11-25 NOTE — H&P (VIEW-ONLY)
GASTROENTEROLOGY    Patient and her family agree to flex sig in AM to evaluate rectal bleeding. Anemia with stable Hgb. NPO after midnight. Will arrange for enemas in AM to prepare for procedure.     Will follow    Lavinia Ferris MD

## 2021-11-25 NOTE — PROGRESS NOTES
1800-END OF SHIFT NOTE:    -pt rested well throughout the shift  -periodically turning   -bloody bm   -NPO at midnight for flex sig in am   -vss, no needs voiced at this time     Intake/Output  11/25 0701 - 11/25 1900  In: 480 [P.O.:480]  Out: -  voiding in brief, unable to measure   Voiding: YES  Catheter: NO  Drain:   External Urinary Catheter 11/25/21 (Active)   Site Assessment Clean, dry, & intact 11/25/21 0820   Repositioned Yes 11/25/21 0820   Perineal Care Yes 11/25/21 0820   Wick Changed Yes 11/25/21 0820   Suction Canister/Tubing Changed Yes 11/25/21 0820   Urine Output (mL) 150 ml 11/25/21 0622           Stool:  1 occurrences. Stool Assessment  Stool Color: Brown (11/25/21 1230)  Stool Appearance: Soft (11/25/21 1230)  Stool Amount: Medium (11/25/21 1230)  Stool Source/Status: Rectum (11/25/21 1230)    Emesis:  0 occurrences. VITAL SIGNS  Patient Vitals for the past 12 hrs:   Temp Pulse Resp BP SpO2   11/25/21 1512 98 °F (36.7 °C) 78 18 104/62 96 %   11/25/21 1100 97.9 °F (36.6 °C) 76 18 (!) 97/52 97 %   11/25/21 0730 97.8 °F (36.6 °C) 69 18 (!) 108/55 98 %       Pain Assessment  Pain 1  Pain Scale 1: Visual (11/25/21 0900)  Pain Intensity 1: 0 (11/25/21 0900)  Patient Stated Pain Goal: 0 (11/25/21 0900)  Pain Reassessment 1: Yes (11/25/21 0900)  Pain Onset 1: pta (11/25/21 0820)  Pain Location 1: Back (11/25/21 0820)  Pain Orientation 1: Mid (11/25/21 0820)  Pain Description 1: Aching (11/25/21 0820)  Pain Intervention(s) 1: Medication (see MAR) (11/25/21 0820)    Ambulating  No    Additional Information:     Shift report given to oncoming nurse at the bedside.     Lanie Moreland RN

## 2021-11-26 ENCOUNTER — HOSPITAL ENCOUNTER (OUTPATIENT)
Age: 86
Setting detail: OUTPATIENT SURGERY
Discharge: OTHER INSTITUTION | DRG: 378 | End: 2021-11-26
Attending: INTERNAL MEDICINE | Admitting: INTERNAL MEDICINE
Payer: MEDICARE

## 2021-11-26 ENCOUNTER — ANESTHESIA (OUTPATIENT)
Dept: ENDOSCOPY | Age: 86
DRG: 378 | End: 2021-11-26
Payer: MEDICARE

## 2021-11-26 ENCOUNTER — ANESTHESIA EVENT (OUTPATIENT)
Dept: ENDOSCOPY | Age: 86
DRG: 378 | End: 2021-11-26
Payer: MEDICARE

## 2021-11-26 VITALS
SYSTOLIC BLOOD PRESSURE: 108 MMHG | OXYGEN SATURATION: 95 % | RESPIRATION RATE: 16 BRPM | HEART RATE: 74 BPM | HEIGHT: 66 IN | DIASTOLIC BLOOD PRESSURE: 52 MMHG | TEMPERATURE: 97 F | WEIGHT: 120 LBS | BODY MASS INDEX: 19.29 KG/M2

## 2021-11-26 LAB
ANION GAP SERPL CALC-SCNC: 4 MMOL/L (ref 7–16)
BUN SERPL-MCNC: 26 MG/DL (ref 8–23)
CALCIUM SERPL-MCNC: 9.6 MG/DL (ref 8.3–10.4)
CHLORIDE SERPL-SCNC: 101 MMOL/L (ref 98–107)
CO2 SERPL-SCNC: 26 MMOL/L (ref 21–32)
CREAT SERPL-MCNC: 0.58 MG/DL (ref 0.6–1)
ERYTHROCYTE [DISTWIDTH] IN BLOOD BY AUTOMATED COUNT: 14.8 % (ref 11.9–14.6)
GLUCOSE SERPL-MCNC: 100 MG/DL (ref 65–100)
HCT VFR BLD AUTO: 21.5 % (ref 35.8–46.3)
HCT VFR BLD AUTO: 23.4 % (ref 35.8–46.3)
HGB BLD-MCNC: 7.1 G/DL (ref 11.7–15.4)
HGB BLD-MCNC: 7.7 G/DL (ref 11.7–15.4)
MCH RBC QN AUTO: 33.2 PG (ref 26.1–32.9)
MCHC RBC AUTO-ENTMCNC: 33 G/DL (ref 31.4–35)
MCV RBC AUTO: 100.5 FL (ref 79.6–97.8)
NRBC # BLD: 0 K/UL (ref 0–0.2)
PLATELET # BLD AUTO: 324 K/UL (ref 150–450)
PMV BLD AUTO: 9.1 FL (ref 9.4–12.3)
POTASSIUM SERPL-SCNC: 4.7 MMOL/L (ref 3.5–5.1)
RBC # BLD AUTO: 2.14 M/UL (ref 4.05–5.2)
SODIUM SERPL-SCNC: 131 MMOL/L (ref 136–145)
WBC # BLD AUTO: 5 K/UL (ref 4.3–11.1)

## 2021-11-26 PROCEDURE — 74011250636 HC RX REV CODE- 250/636: Performed by: INTERNAL MEDICINE

## 2021-11-26 PROCEDURE — 74011250637 HC RX REV CODE- 250/637: Performed by: HOSPITALIST

## 2021-11-26 PROCEDURE — 97530 THERAPEUTIC ACTIVITIES: CPT

## 2021-11-26 PROCEDURE — 0DJD8ZZ INSPECTION OF LOWER INTESTINAL TRACT, VIA NATURAL OR ARTIFICIAL OPENING ENDOSCOPIC: ICD-10-PCS | Performed by: HOSPITALIST

## 2021-11-26 PROCEDURE — 74011000258 HC RX REV CODE- 258: Performed by: HOSPITALIST

## 2021-11-26 PROCEDURE — 74011000250 HC RX REV CODE- 250: Performed by: NURSE ANESTHETIST, CERTIFIED REGISTERED

## 2021-11-26 PROCEDURE — 74011250636 HC RX REV CODE- 250/636: Performed by: NURSE ANESTHETIST, CERTIFIED REGISTERED

## 2021-11-26 PROCEDURE — 85027 COMPLETE CBC AUTOMATED: CPT

## 2021-11-26 PROCEDURE — 65270000029 HC RM PRIVATE

## 2021-11-26 PROCEDURE — 74011250637 HC RX REV CODE- 250/637: Performed by: INTERNAL MEDICINE

## 2021-11-26 PROCEDURE — 80048 BASIC METABOLIC PNL TOTAL CA: CPT

## 2021-11-26 PROCEDURE — 74011250636 HC RX REV CODE- 250/636: Performed by: HOSPITALIST

## 2021-11-26 PROCEDURE — 36415 COLL VENOUS BLD VENIPUNCTURE: CPT

## 2021-11-26 PROCEDURE — 85018 HEMOGLOBIN: CPT

## 2021-11-26 PROCEDURE — 2709999900 HC NON-CHARGEABLE SUPPLY: Performed by: INTERNAL MEDICINE

## 2021-11-26 PROCEDURE — 76040000025: Performed by: INTERNAL MEDICINE

## 2021-11-26 PROCEDURE — 76060000031 HC ANESTHESIA FIRST 0.5 HR: Performed by: INTERNAL MEDICINE

## 2021-11-26 RX ORDER — BISACODYL 5 MG
5 TABLET, DELAYED RELEASE (ENTERIC COATED) ORAL DAILY PRN
Status: DISCONTINUED | OUTPATIENT
Start: 2021-11-26 | End: 2021-11-28 | Stop reason: HOSPADM

## 2021-11-26 RX ORDER — PROPOFOL 10 MG/ML
INJECTION, EMULSION INTRAVENOUS AS NEEDED
Status: DISCONTINUED | OUTPATIENT
Start: 2021-11-26 | End: 2021-11-26 | Stop reason: HOSPADM

## 2021-11-26 RX ORDER — TRAMADOL HYDROCHLORIDE 50 MG/1
50 TABLET ORAL
Status: DISCONTINUED | OUTPATIENT
Start: 2021-11-26 | End: 2021-11-28 | Stop reason: HOSPADM

## 2021-11-26 RX ORDER — CARBOXYMETHYLCELLULOSE SODIUM 10 MG/ML
1 GEL OPHTHALMIC AS NEEDED
Status: DISCONTINUED | OUTPATIENT
Start: 2021-11-26 | End: 2021-11-28 | Stop reason: HOSPADM

## 2021-11-26 RX ORDER — SODIUM CHLORIDE, SODIUM LACTATE, POTASSIUM CHLORIDE, CALCIUM CHLORIDE 600; 310; 30; 20 MG/100ML; MG/100ML; MG/100ML; MG/100ML
1000 INJECTION, SOLUTION INTRAVENOUS CONTINUOUS
Status: DISCONTINUED | OUTPATIENT
Start: 2021-11-26 | End: 2021-11-26 | Stop reason: HOSPADM

## 2021-11-26 RX ORDER — ONDANSETRON 4 MG/1
4 TABLET, ORALLY DISINTEGRATING ORAL
Status: DISCONTINUED | OUTPATIENT
Start: 2021-11-26 | End: 2021-11-28 | Stop reason: HOSPADM

## 2021-11-26 RX ORDER — POLYETHYLENE GLYCOL 3350 17 G/17G
17 POWDER, FOR SOLUTION ORAL 2 TIMES DAILY
Status: DISCONTINUED | OUTPATIENT
Start: 2021-11-26 | End: 2021-11-28 | Stop reason: HOSPADM

## 2021-11-26 RX ORDER — DICLOFENAC SODIUM 10 MG/G
2 GEL TOPICAL 4 TIMES DAILY
Status: DISCONTINUED | OUTPATIENT
Start: 2021-11-26 | End: 2021-11-28 | Stop reason: HOSPADM

## 2021-11-26 RX ORDER — SODIUM CHLORIDE 9 MG/ML
75 INJECTION, SOLUTION INTRAVENOUS CONTINUOUS
Status: DISCONTINUED | OUTPATIENT
Start: 2021-11-26 | End: 2021-11-27

## 2021-11-26 RX ORDER — ONDANSETRON 2 MG/ML
4 INJECTION INTRAMUSCULAR; INTRAVENOUS
Status: DISCONTINUED | OUTPATIENT
Start: 2021-11-26 | End: 2021-11-28 | Stop reason: HOSPADM

## 2021-11-26 RX ORDER — HYDROCORTISONE ACETATE 25 MG/1
25 SUPPOSITORY RECTAL EVERY 12 HOURS
Status: DISCONTINUED | OUTPATIENT
Start: 2021-11-26 | End: 2021-11-28 | Stop reason: HOSPADM

## 2021-11-26 RX ORDER — SODIUM CHLORIDE 0.9 % (FLUSH) 0.9 %
5-40 SYRINGE (ML) INJECTION EVERY 8 HOURS
Status: DISCONTINUED | OUTPATIENT
Start: 2021-11-26 | End: 2021-11-28 | Stop reason: HOSPADM

## 2021-11-26 RX ORDER — LEVOTHYROXINE SODIUM 75 UG/1
75 TABLET ORAL
Status: DISCONTINUED | OUTPATIENT
Start: 2021-11-27 | End: 2021-11-28 | Stop reason: HOSPADM

## 2021-11-26 RX ORDER — EPHEDRINE SULFATE/0.9% NACL/PF 50 MG/5 ML
SYRINGE (ML) INTRAVENOUS AS NEEDED
Status: DISCONTINUED | OUTPATIENT
Start: 2021-11-26 | End: 2021-11-26 | Stop reason: HOSPADM

## 2021-11-26 RX ORDER — FACIAL-BODY WIPES
10 EACH TOPICAL DAILY PRN
Status: DISCONTINUED | OUTPATIENT
Start: 2021-11-26 | End: 2021-11-28 | Stop reason: HOSPADM

## 2021-11-26 RX ORDER — SODIUM CHLORIDE 0.9 % (FLUSH) 0.9 %
5-40 SYRINGE (ML) INJECTION AS NEEDED
Status: DISCONTINUED | OUTPATIENT
Start: 2021-11-26 | End: 2021-11-28 | Stop reason: HOSPADM

## 2021-11-26 RX ORDER — LIDOCAINE HYDROCHLORIDE 20 MG/ML
INJECTION, SOLUTION EPIDURAL; INFILTRATION; INTRACAUDAL; PERINEURAL AS NEEDED
Status: DISCONTINUED | OUTPATIENT
Start: 2021-11-26 | End: 2021-11-26 | Stop reason: HOSPADM

## 2021-11-26 RX ORDER — PROMETHAZINE HYDROCHLORIDE 25 MG/1
12.5 TABLET ORAL
Status: DISCONTINUED | OUTPATIENT
Start: 2021-11-26 | End: 2021-11-28 | Stop reason: HOSPADM

## 2021-11-26 RX ORDER — AMLODIPINE BESYLATE 5 MG/1
2.5 TABLET ORAL EVERY 12 HOURS
Status: DISCONTINUED | OUTPATIENT
Start: 2021-11-26 | End: 2021-11-28 | Stop reason: HOSPADM

## 2021-11-26 RX ORDER — ESCITALOPRAM OXALATE 10 MG/1
20 TABLET ORAL DAILY
Status: DISCONTINUED | OUTPATIENT
Start: 2021-11-27 | End: 2021-11-28 | Stop reason: HOSPADM

## 2021-11-26 RX ORDER — ACETAMINOPHEN 325 MG/1
650 TABLET ORAL
Status: DISCONTINUED | OUTPATIENT
Start: 2021-11-26 | End: 2021-11-28 | Stop reason: HOSPADM

## 2021-11-26 RX ORDER — SODIUM CHLORIDE TAB 1 GM 1 G
1 TAB MISCELLANEOUS 2 TIMES DAILY WITH MEALS
Status: DISCONTINUED | OUTPATIENT
Start: 2021-11-26 | End: 2021-11-28 | Stop reason: HOSPADM

## 2021-11-26 RX ORDER — ACETAMINOPHEN 650 MG/1
650 SUPPOSITORY RECTAL
Status: DISCONTINUED | OUTPATIENT
Start: 2021-11-26 | End: 2021-11-28 | Stop reason: HOSPADM

## 2021-11-26 RX ORDER — LORAZEPAM 0.5 MG/1
0.25 TABLET ORAL 2 TIMES DAILY
Status: DISCONTINUED | OUTPATIENT
Start: 2021-11-26 | End: 2021-11-28 | Stop reason: HOSPADM

## 2021-11-26 RX ADMIN — SODIUM CHLORIDE 75 ML/HR: 900 INJECTION, SOLUTION INTRAVENOUS at 14:52

## 2021-11-26 RX ADMIN — SODIUM CHLORIDE, SODIUM LACTATE, POTASSIUM CHLORIDE, AND CALCIUM CHLORIDE 1000 ML: 600; 310; 30; 20 INJECTION, SOLUTION INTRAVENOUS at 12:24

## 2021-11-26 RX ADMIN — Medication 10 ML: at 21:15

## 2021-11-26 RX ADMIN — ESCITALOPRAM OXALATE 20 MG: 10 TABLET ORAL at 09:12

## 2021-11-26 RX ADMIN — Medication 10 ML: at 05:20

## 2021-11-26 RX ADMIN — ACETAMINOPHEN 650 MG: 325 TABLET, FILM COATED ORAL at 20:37

## 2021-11-26 RX ADMIN — TRAMADOL HYDROCHLORIDE 50 MG: 50 TABLET, COATED ORAL at 15:22

## 2021-11-26 RX ADMIN — Medication 10 ML: at 14:51

## 2021-11-26 RX ADMIN — AMLODIPINE BESYLATE 2.5 MG: 5 TABLET ORAL at 20:32

## 2021-11-26 RX ADMIN — LORAZEPAM 0.25 MG: 0.5 TABLET ORAL at 09:11

## 2021-11-26 RX ADMIN — Medication 20 MG: at 12:59

## 2021-11-26 RX ADMIN — PSYLLIUM HUSK 1 PACKET: 3.4 POWDER ORAL at 17:19

## 2021-11-26 RX ADMIN — DICLOFENAC SODIUM 2 G: 10 GEL TOPICAL at 09:18

## 2021-11-26 RX ADMIN — CEFEPIME HYDROCHLORIDE 1 G: 1 INJECTION, POWDER, FOR SOLUTION INTRAMUSCULAR; INTRAVENOUS at 09:12

## 2021-11-26 RX ADMIN — PROPOFOL 50 MG: 10 INJECTION, EMULSION INTRAVENOUS at 12:56

## 2021-11-26 RX ADMIN — DICLOFENAC SODIUM 2 G: 10 GEL TOPICAL at 17:20

## 2021-11-26 RX ADMIN — HYDROCORTISONE ACETATE 25 MG: 25 SUPPOSITORY RECTAL at 21:15

## 2021-11-26 RX ADMIN — SODIUM CHLORIDE, SODIUM LACTATE, POTASSIUM CHLORIDE, AND CALCIUM CHLORIDE: 600; 310; 30; 20 INJECTION, SOLUTION INTRAVENOUS at 13:00

## 2021-11-26 RX ADMIN — PROPOFOL 50 MG: 10 INJECTION, EMULSION INTRAVENOUS at 13:06

## 2021-11-26 RX ADMIN — LIDOCAINE HYDROCHLORIDE 50 MG: 20 INJECTION, SOLUTION EPIDURAL; INFILTRATION; INTRACAUDAL; PERINEURAL at 12:56

## 2021-11-26 RX ADMIN — Medication 10 MG: at 13:09

## 2021-11-26 RX ADMIN — PROPOFOL 50 MG: 10 INJECTION, EMULSION INTRAVENOUS at 13:01

## 2021-11-26 RX ADMIN — Medication 20 MG: at 13:06

## 2021-11-26 RX ADMIN — Medication 1 G: at 17:19

## 2021-11-26 RX ADMIN — AMLODIPINE BESYLATE 2.5 MG: 5 TABLET ORAL at 09:11

## 2021-11-26 RX ADMIN — CARBOXYMETHYLCELLULOSE SODIUM 1 DROP: 10 GEL OPHTHALMIC at 15:24

## 2021-11-26 RX ADMIN — Medication 1 G: at 09:11

## 2021-11-26 RX ADMIN — LORAZEPAM 0.25 MG: 0.5 TABLET ORAL at 17:19

## 2021-11-26 RX ADMIN — POLYETHYLENE GLYCOL 3350 17 G: 17 POWDER, FOR SOLUTION ORAL at 17:19

## 2021-11-26 RX ADMIN — TRAMADOL HYDROCHLORIDE 50 MG: 50 TABLET, COATED ORAL at 21:21

## 2021-11-26 RX ADMIN — DICLOFENAC SODIUM 2 G: 10 GEL TOPICAL at 21:16

## 2021-11-26 NOTE — PROGRESS NOTES
Patient arrived from  via [facility]. Pt has glasses and upper partials in prep. No family with patient at this time. VSS, on RA.  A&Ox4

## 2021-11-26 NOTE — PROGRESS NOTES
Hospitalist Progress Note   Admit Date:  2021  2:04 PM   Name:  Kandice Ortega   Age:  80 y.o. Sex:  female  :  1926   MRN:  [id number]   Room:  [facility]/    Presenting Complaint: Constipation    Reason(s) for Admission: Lower GI bleed [K92.2]  Acute blood loss anemia [D62]     Hospital Course & Interval History: This is a very nice 81 y/o WF with a h/o CAD, HTN, chronic hyponatremia who was admitted to our service on  with acute blood loss anemia and hematochezia. Hb 1 week prior to admission was in the 10s; 3 months ago was high 11. On presentation it was 9.7. She describes nothing but blood during each BM. She was admitted and started on IVFs, PPI, serial H/H GI consult. Suppository for severe constipation. No endoscopy per patient and family. Lauryn Bui was consulted with concerns for vaginal bleeding but no sourcewas identified, patient s/p hysterectomy so likely all rectal. Hb remains stable. Working with PT/OT well but still weak. She has had ongoing rectal bleeding since admission. Subjective (21): Patient is being transferred to [facility] for sigmoidoscopy. Hemoglobin this morning is 7.1. She denies any dark stools this morning. She had enema, with return of 2 large bowel movements with dark brown fluid. No fever no chills. No nausea no vomiting no abdominal pain. Assessment & Plan:     Principal Problem: This is a 77-year-old female with    Acute blood loss anemia secondary to acute lower GI bleed  Patient with bleeding per rectum. Initially patient was seen by GYN on  for suspected vaginal bleeding. No lesions noted and patient is status post hysterectomy. GI on board. Appreciate recommendations.  hemoglobin dropped to 7.1 this morning from 7.6 yesterday. Plan to transfuse if hemoglobin less than 7. Recheck H&H this afternoon. GI plans for sigmoidoscopy at [facility] today. Urinary tract infection  Urine cultures positive for Morganella.   Patient completed 3-day course of cefepime today. Hyponatremia  Sodium of 131 this morning. Looks chronic. Baseline seems to be 132-134. Gentle hydration with IV fluids. Repeat BMP in a.m. Hypertension  Continue home medications amlodipine. Hypothyroidism  Levothyroxine    MDD/anxiety  Lexapro, low-dose Ativan. Dispo/Discharge Planning:    Discharge plans pending. Hopefully discharge patient back home with home health in ? 24-48 hours if medically cleared.     Diet:  ADULT ORAL NUTRITION SUPPLEMENT Breakfast, Lunch, Dinner; Standard High Calorie/High Protein  DIET NPO  DVT PPx: SCDs  Code status: Full Code    Hospital Problems as of 11/26/2021 Date Reviewed: 11/18/2021          Codes Class Noted - Resolved POA    Hyponatremia ICD-10-CM: E87.1  ICD-9-CM: 276.1  11/25/2021 - Present Unknown        * (Principal) Acute blood loss anemia ICD-10-CM: D62  ICD-9-CM: 285.1  11/18/2021 - Present Yes        MDD (major depressive disorder) ICD-10-CM: F32.9  ICD-9-CM: 296.20  11/18/2021 - Present Yes        Anxiety ICD-10-CM: F41.9  ICD-9-CM: 300.00  11/18/2021 - Present Yes        Hypertension ICD-10-CM: I10  ICD-9-CM: 401.9  11/18/2021 - Present Yes        Acquired hypothyroidism ICD-10-CM: E03.9  ICD-9-CM: 244.9  11/18/2021 - Present Yes        Lower GI bleed ICD-10-CM: K92.2  ICD-9-CM: 578.9  11/17/2021 - Present Yes              Objective:     Patient Vitals for the past 24 hrs:   Temp Pulse Resp BP SpO2   11/26/21 1048 98 °F (36.7 °C) 65 16 (!) 115/58 100 %   11/26/21 0729 98.4 °F (36.9 °C) 70 16 114/62 95 %   11/26/21 0305 98.2 °F (36.8 °C) 70 16 100/61 94 %   11/25/21 2314 97.9 °F (36.6 °C) 90 16 107/64 94 %   11/25/21 1920 98.6 °F (37 °C) 80 18 (!) 100/57 94 %   11/25/21 1512 98 °F (36.7 °C) 78 18 104/62 96 %     Oxygen Therapy  O2 Sat (%): 100 % (11/26/21 1048)  Pulse via Oximetry: 67 beats per minute (11/19/21 0745)  O2 Device: None (Room air) (11/25/21 1920)    Estimated body mass index is 19.37 kg/m² as calculated from the following:    Height as of this encounter: 5' 6\" (1.676 m). Weight as of this encounter: 54.4 kg (120 lb). Intake/Output Summary (Last 24 hours) at 11/26/2021 1130  Last data filed at 11/26/2021 0524  Gross per 24 hour   Intake 1443.43 ml   Output 400 ml   Net 1043.43 ml         Physical Exam:     Blood pressure (!) 115/58, pulse 65, temperature 98 °F (36.7 °C), resp. rate 16, height 5' 6\" (1.676 m), weight 54.4 kg (120 lb), SpO2 100 %. General:    Elderly female, no overt distress  Head:  Normocephalic, atraumatic  Eyes:  Pallor, no jaundice, Pupils equally round. ENT:  Nares appear normal, no drainage. Moist oral mucosa  Neck:  No restricted ROM. Trachea midline   CV:   RRR. No m/r/g. No jugular venous distension. Lungs:   CTAB. No wheezing, rhonchi, or rales. Respirations even, unlabored  Abdomen: Bowel sounds present. Soft, nontender, nondistended. Extremities: No cyanosis or clubbing. No edema  Skin:     No rashes and normal coloration. Warm and dry. Neuro:  CN II-XII grossly intact. Sensation intact. A&Ox3  Psych:  Normal mood and affect.       I have reviewed ordered lab tests and independently visualized imaging below:    Recent Labs:  Recent Results (from the past 48 hour(s))   CBC W/O DIFF    Collection Time: 11/25/21  7:51 AM   Result Value Ref Range    WBC 5.7 4.3 - 11.1 K/uL    RBC 2.29 (L) 4.05 - 5.2 M/uL    HGB 7.6 (L) 11.7 - 15.4 g/dL    HCT 23.1 (L) 35.8 - 46.3 %    .9 (H) 79.6 - 97.8 FL    MCH 33.2 (H) 26.1 - 32.9 PG    MCHC 32.9 31.4 - 35.0 g/dL    RDW 14.6 11.9 - 14.6 %    PLATELET 920 849 - 102 K/uL    MPV 9.5 9.4 - 12.3 FL    ABSOLUTE NRBC 0.00 0.0 - 0.2 K/uL   METABOLIC PANEL, BASIC    Collection Time: 11/25/21  7:51 AM   Result Value Ref Range    Sodium 129 (L) 136 - 145 mmol/L    Potassium 4.3 3.5 - 5.1 mmol/L    Chloride 97 (L) 98 - 107 mmol/L    CO2 28 21 - 32 mmol/L    Anion gap 4 (L) 7 - 16 mmol/L    Glucose 84 65 - 100 mg/dL BUN 19 8 - 23 MG/DL    Creatinine 0.62 0.6 - 1.0 MG/DL    GFR est AA >60 >60 ml/min/1.73m2    GFR est non-AA >60 >60 ml/min/1.73m2    Calcium 9.2 8.3 - 10.4 MG/DL   HGB & HCT    Collection Time: 11/25/21  3:06 PM   Result Value Ref Range    HGB 7.6 (L) 11.7 - 15.4 g/dL    HCT 23.0 (L) 35.8 - 46.3 %   CBC W/O DIFF    Collection Time: 11/26/21  3:00 AM   Result Value Ref Range    WBC 5.0 4.3 - 11.1 K/uL    RBC 2.14 (L) 4.05 - 5.2 M/uL    HGB 7.1 (L) 11.7 - 15.4 g/dL    HCT 21.5 (L) 35.8 - 46.3 %    .5 (H) 79.6 - 97.8 FL    MCH 33.2 (H) 26.1 - 32.9 PG    MCHC 33.0 31.4 - 35.0 g/dL    RDW 14.8 (H) 11.9 - 14.6 %    PLATELET 082 242 - 700 K/uL    MPV 9.1 (L) 9.4 - 12.3 FL    ABSOLUTE NRBC 0.00 0.0 - 0.2 K/uL   METABOLIC PANEL, BASIC    Collection Time: 11/26/21  3:00 AM   Result Value Ref Range    Sodium 131 (L) 136 - 145 mmol/L    Potassium 4.7 3.5 - 5.1 mmol/L    Chloride 101 98 - 107 mmol/L    CO2 26 21 - 32 mmol/L    Anion gap 4 (L) 7 - 16 mmol/L    Glucose 100 65 - 100 mg/dL    BUN 26 (H) 8 - 23 MG/DL    Creatinine 0.58 (L) 0.6 - 1.0 MG/DL    GFR est AA >60 >60 ml/min/1.73m2    GFR est non-AA >60 >60 ml/min/1.73m2    Calcium 9.6 8.3 - 10.4 MG/DL       All Micro Results     Procedure Component Value Units Date/Time    CULTURE, URINE [[id number]]  (Abnormal)  (Susceptibility) Collected: 11/21/21 0423    Order Status: Completed Specimen: Urine from Clean catch Updated: 11/24/21 0649     Special Requests: NO SPECIAL REQUESTS        Culture result:       >100,000 COLONIES/mL MORGANELLA MORGANII                  >100,000 COLONIES/mL MIXED SKIN LONNIE ISOLATED          COVID-19 RAPID TEST [[id number]] Collected: 11/17/21 1930    Order Status: Completed Specimen: Nasopharyngeal Updated: 11/17/21 2011     Specimen source NASAL SWAB        COVID-19 rapid test Not detected        Comment:      The specimen is NEGATIVE for SARS-CoV-2, the novel coronavirus associated with COVID-19.   A negative result does not rule out COVID-19. This test has been authorized by the FDA under an Emergency Use Authorization (EUA) for use by authorized laboratories. Fact sheet for Healthcare Providers: ConventionUpdate.co.nz  Fact sheet for Patients: ConventionUpdate.co.nz       Methodology: Isothermal Nucleic Acid Amplification               Other Studies:  No results found.     Current Meds:  Current Facility-Administered Medications   Medication Dose Route Frequency    0.9% sodium chloride infusion  75 mL/hr IntraVENous CONTINUOUS    sodium chloride (OCEAN) 0.65 % nasal squeeze bottle 2 Spray  2 Spray Both Nostrils Q2H PRN    psyllium husk-aspartame (METAMUCIL FIBER) packet 1 Packet  1 Packet Oral BID    sodium chloride tablet 1 g  1 g Oral BID WITH MEALS    carboxymethylcellulose sodium (REFRESH LIQUIGEL) 1 % ophthalmic solution 1 Drop  1 Drop Both Eyes PRN    bisacodyL (DULCOLAX) suppository 10 mg  10 mg Rectal DAILY PRN    lip protectant (BLISTEX) ointment 1 Each  1 Each Topical PRN    hydrocortisone (ANUSOL-HC) suppository 25 mg  25 mg Rectal Q12H    polyethylene glycol (MIRALAX) packet 17 g  17 g Oral BID    sodium chloride (NS) flush 5-40 mL  5-40 mL IntraVENous Q8H    sodium chloride (NS) flush 5-40 mL  5-40 mL IntraVENous PRN    ondansetron (ZOFRAN ODT) tablet 4 mg  4 mg Oral Q8H PRN    Or    ondansetron (ZOFRAN) injection 4 mg  4 mg IntraVENous Q6H PRN    bisacodyL (DULCOLAX) tablet 5 mg  5 mg Oral DAILY PRN    acetaminophen (TYLENOL) tablet 650 mg  650 mg Oral Q6H PRN    Or    acetaminophen (TYLENOL) suppository 650 mg  650 mg Rectal Q6H PRN    amLODIPine (NORVASC) tablet 2.5 mg  2.5 mg Oral Q12H    escitalopram oxalate (LEXAPRO) tablet 20 mg  20 mg Oral DAILY    diclofenac (VOLTAREN) 1 % topical gel 2 g  2 g Topical QID    levothyroxine (SYNTHROID) tablet 75 mcg  75 mcg Oral ACB    LORazepam (ATIVAN) tablet 0.25 mg  0.25 mg Oral BID    promethazine (PHENERGAN) tablet 12.5 mg  12.5 mg Oral QHS PRN    traMADoL (ULTRAM) tablet 50 mg  50 mg Oral Q6H PRN       Signed:  Trisha De La Torre MD    Part of this note may have been written by using a voice dictation software. The note has been proof read but may still contain some grammatical/other typographical errors.

## 2021-11-26 NOTE — PROGRESS NOTES
TRANSFER - OUT REPORT:    Verbal report given to EMBER rodriguez(name) on Burt Rosado  being transferred to GI  Lab (unit) for routine progression of care       Report consisted of patients Situation, Background, Assessment and   Recommendations(SBAR). Information from the following report(s) SBAR was reviewed with the receiving nurse. Lines:   Peripheral IV 11/25/21 Left; Posterior Forearm (Active)   Site Assessment Clean, dry, & intact 11/26/21 0736   Phlebitis Assessment 0 11/26/21 0736   Infiltration Assessment 0 11/26/21 0736   Dressing Status Clean, dry, & intact 11/26/21 0736   Dressing Type Tape; Transparent 11/26/21 0736   Hub Color/Line Status Flushed; Patent 11/26/21 0207   Action Taken Other (comment) 11/26/21 0207   Alcohol Cap Used No 11/25/21 1508        Opportunity for questions and clarification was provided.

## 2021-11-26 NOTE — PROGRESS NOTES
TRANSFER - OUT REPORT:    Verbal report given to Sulema Nettles RN on Marga Gold  being transferred to  [phone] for routine progression of care       Report consisted of patients Situation, Background, Assessment and   Recommendations(SBAR). Information from the following report(s) SBAR and Procedure Summary was reviewed with the receiving nurse. Lines:   Peripheral IV 11/25/21 Left; Posterior Forearm (Active)   Site Assessment Clean, dry, & intact 11/26/21 1221   Phlebitis Assessment 0 11/26/21 1221   Infiltration Assessment 0 11/26/21 1221   Dressing Status Clean, dry, & intact 11/26/21 1221   Dressing Type Tape; Transparent 11/26/21 1221   Hub Color/Line Status Blue; Infusing; Flushed; Patent 11/26/21 1221   Action Taken Other (comment) 11/26/21 0207   Alcohol Cap Used No 11/25/21 1508        Opportunity for questions and clarification was provided. Patient transported via [facility].

## 2021-11-26 NOTE — PROGRESS NOTES
Problem: Mobility Impaired (Adult and Pediatric)  Goal: *Acute Goals and Plan of Care (Insert Text)  Outcome: Progressing Towards Goal  Note: GOALS reviewed and revised as follows 11/26/21  LTG:  (1.)Ms. Alisia Tobin will move from sit to supine  in bed with STAND BY ASSIST within 5 treatment day(s). (2.)Ms. Alisia Tobin will transfer from bed to chair and chair to bed with MODERATE ASSIST using the least restrictive device within 5 treatment day(s). (3.)Pt. will increase B LE strength 1/2 grade within 5 days  (4.)Pt. will increase endurance to tolerate up in recliner for 3 hours within 5 days    ________________________________________________________________________________________________       PHYSICAL THERAPY: Re-evaluation and AM 11/26/2021  INPATIENT: PT Visit Days : 1  [place] / [place] / Product Type: Medicare /       NAME/AGE/GENDER: Marga Gold is a 80 y.o. female   PRIMARY DIAGNOSIS: Lower GI bleed [K92.2]  Acute blood loss anemia [D62] Acute blood loss anemia Acute blood loss anemia       ICD-10: Treatment Diagnosis:    · Generalized Muscle Weakness (M62.81)  · Abnormal posture (R29.3)   Precaution/Allergies:  Patient has no known allergies. ASSESSMENT:     Ms. Alisia Tobin presents with above diagnoses. Patient demonstrates weakness throughout affecting her balance and mobility. She uses a wheelchair for mobility and has assist for all transfers. Patient would benefit from therapy to ensure ability to continue to transfer at home. She has assist from her grandson and his spouse and has homecare services. Patient is upset about her current living situation and current medical situation. She feels she is a burden on her family. She is asking to go to [facility] at [place]. This section established at most recent assessment   PROBLEM LIST (Impairments causing functional limitations):  1. Decreased Strength  2. Decreased ADL/Functional Activities  3.  Decreased Transfer Abilities  4. Decreased Balance  5. Decreased Activity Tolerance  6. Increased Fatigue  7. Decreased Flexibility/Joint Mobility  8. Decreased [place] with Home Exercise Program   INTERVENTIONS PLANNED: (Benefits and precautions of physical therapy have been discussed with the patient.)  1. Balance Exercise  2. Bed Mobility  3. Range of Motion (ROM)  4. Therapeutic Activites  5. Therapeutic Exercise/Strengthening  6. Transfer Training     TREATMENT PLAN: Frequency/Duration: daily for duration of hospital stay  Rehabilitation Potential For Stated Goals: [facility] (at time of discharge pending progress):    Placement: It is my opinion, based on this patient's performance to date, that Ms. Lizeth Canales may benefit from intensive therapy at a [address] [place] [address] after discharge due to the functional deficits listed above that are likely to improve with skilled rehabilitation and concerns that he/she may be unsafe to be unsupervised at home due to level of functional mobility and need for 24 hour supervision and assistance. .  Equipment:    None at this time              HISTORY:   History of Present Injury/Illness (Reason for Referral):  Silvana Shelton is a 80 y.o. female with medical history of hemorrhoids, coronary artery disease, hip fracture, osteoporosis, chronic hyponatremia, chronic cystitis, hypertension who presents to the emergency department after being evaluated at [facility] emergency center for the patient was complaining of constant need for having to have a bowel movement and when she does this she has no evacuation of feces and has bright red blood. Apparently at [facility] emergency department emergency room physician attempted to have the patient passed a bowel movement indeed did not have any fecal episode but did have a large bright red blood per rectum episode. Subsequently the patient was sent to our emergency department for further evaluation and treatment.   At the time of my evaluation of the patient patient has become significantly agitated while she is oriented to person place and time as well as being able to tell me that she has been constipated for a week but has only been able to pass bright red blood she cannot comprehend why she has to be admitted to the hospital and she further is tearful and a poor historian and she is very agitated. Much of the information obtained in my history and physical was obtained from prior medical records and outside medical records. Course of action in the emergency department-KUB was obtained that demonstrated no acute process. H&H was obtained that demonstrated hemoglobin 9.6 hematocrit of 28.9.. BMP demonstrated a serum sodium of 134 potassium 3.8 chloride of 96 CO2 of 28 anion gap of 10 BUN of 17 creatinine of 0.7. Outside medical records were reviewed and patient has a baseline hemoglobin approximately 11. Secondary to 3 g drop in patient's hemoglobin hematocrit the decision was made to admit the patient for further evaluation and treatment. Past Medical History/Comorbidities:   Ms. Diana Irby  has a past medical history of Anxiety, CAD (coronary artery disease), Chronic hyponatremia, Hemorrhoids, and OA (osteoarthritis). Ms. Diana Irby  has no past surgical history on file.   Social History/Living Environment:   Home Environment: Private residence  One/Two Story Residence: One story  Living Alone: No  Support Systems: Other Family Member(s)  Patient Expects to be Discharged to[de-identified] House  Current DME Used/Available at Home: Wheelchair  Prior Level of Function/Work/Activity:  Wheelchair for mobility and assist for transfers     Number of Personal Factors/Comorbidities that affect the Plan of Care: 1-2: MODERATE COMPLEXITY   EXAMINATION:   Most Recent Physical Functioning:   Gross Assessment: B LEs: Ankle 2; knee 2+; hip 2+                  Posture:     Balance:    Bed Mobility:  Rolling: Minimum assistance  Duration: 25 Minutes  Wheelchair Mobility:     Transfers: deferred per pt request     Gait:            Body Structures Involved:  1. Digestive Structures  2. Joints  3. Muscles Body Functions Affected:  1. Neuromusculoskeletal  2. Movement Related  3. Digestive Activities and Participation Affected:  1. General Tasks and Demands  2. Mobility  3. Self Care   Number of elements that affect the Plan of Care: 4+: HIGH COMPLEXITY   CLINICAL PRESENTATION:   Presentation: Evolving clinical presentation with changing clinical characteristics: MODERATE COMPLEXITY   CLINICAL DECISION MAKIN [facility] Inpatient Short Form  How much difficulty does the patient currently have. .. Unable A Lot A Little None   1. Turning over in bed (including adjusting bedclothes, sheets and blankets)? [] 1   [] 2   [x] 3   [] 4   2. Sitting down on and standing up from a chair with arms ( e.g., wheelchair, bedside commode, etc.)   [] 1   [x] 2   [] 3   [] 4   3. Moving from lying on back to sitting on the side of the bed? [] 1   [] 2   [x] 3   [] 4   How much help from another person does the patient currently need. .. Total A Lot A Little None   4. Moving to and from a bed to a chair (including a wheelchair)? [] 1   [x] 2   [] 3   [] 4   5. Need to walk in hospital room? [x] 1   [] 2   [] 3   [] 4   6. Climbing 3-5 steps with a railing? [x] 1   [] 2   [] 3   [] 4   © , Trustees of [address] [place] [address], under license to [employer]. All rights reserved      Score:  Initial: 12 Most Recent: 12 (Date: 21)    Interpretation of Tool:  Represents activities that are increasingly more difficult (i.e. Bed mobility, Transfers, Gait). Medical Necessity:     · Patient is expected to demonstrate progress in   · strength, balance, and coordination  ·  to   · decrease assistance required with bed mobility and transfers.   · .  Reason for Services/Other Comments:  · Patient continues to require skilled intervention due to   · Weakness limiting mobility. · .   Use of outcome tool(s) and clinical judgement create a POC that gives a: Clear prediction of patient's progress: LOW COMPLEXITY            TREATMENT:   (In addition to Assessment/Re-Assessment sessions the following treatments were rendered)   Pre-treatment Symptoms/Complaints:  Feels very weak today. Getting ready to go for sigmoidoscopy. Pain: Initial:   Pain Intensity 1: 0  Post Session: 0, supine in bed     Therapeutic Activity: (25 Minutes   ):  Therapeutic activities including bed mobility and exs to all 4 extremities to improve mobility, strength, balance, and coordination. Required moderate verbal and manual cues  to promote static and dynamic balance in standing and promote coordination of bilateral, lower extremity(s). Date:  11/22 Date:  11/22 pm   Date:  11/23   Date:  11/24 Date:  11/25 11/26/21   Activity/Exercise Parameters Parameters Parameters      Ankle pumps 10 AA  10 12 12 b 15   Hip abd/add 10 AA    12 b 15 AA   Shoulder flex/ext 10 AA    12 b sitting 15 AA R/A L   Elbow flex/ext 10 AA    12 b sitting 15   Marching in place  10 AA 10 12 12 b sitting    LAQ   10 12 12 b sitting    SLR      10   SAQ      15   Bridging      10   Trunk rotation(hooklying)      10              Braces/Orthotics/Lines/Etc:   · IV  · O2 Device: None (Room air)  Treatment/Session Assessment:    · Response to Treatment:did well. Refused to sit up or get out bed due to feeling weak  · Interdisciplinary Collaboration:   o Physical Therapist  o Registered Nurse  · After treatment position/precautions:   o Supine in bed  o Bed/Chair-wheels locked  o Bed in low position  o Call light within reach  o RN notified  o Side rails x 3   · Compliance with Program/Exercises: Will assess as treatment progresses  · Recommendations/Intent for next treatment session:   \"Next visit will focus on advancements to more challenging activities and reduction in assistance provided\".   Total Treatment Duration:  PT Patient Time In/Time Out  Time In: 0945  Time Out: Patricia [address], [place]

## 2021-11-26 NOTE — ANESTHESIA PREPROCEDURE EVALUATION
Relevant Problems   NEUROLOGY   (+) MDD (major depressive disorder)      CARDIOVASCULAR   (+) Hypertension      ENDOCRINE   (+) Acquired hypothyroidism      HEMATOLOGY   (+) Acute blood loss anemia       Anesthetic History               Review of Systems / Medical History      Pulmonary                   Neuro/Psych         Psychiatric history     Cardiovascular    Hypertension              Exercise tolerance: <4 METS  Comments: Patient denies CAD or active cardiac conditions except for being tired   GI/Hepatic/Renal               Comments: Lower GIB Endo/Other      Hypothyroidism  Anemia (Hgb 7)     Other Findings              Physical Exam    Airway  Mallampati: III  TM Distance: 4 - 6 cm  Neck ROM: normal range of motion   Mouth opening: Normal     Cardiovascular    Rhythm: regular  Rate: normal    Murmur: Grade 1, Mitral area     Dental    Dentition: Poor dentition     Pulmonary            Prolonged expiration     Abdominal         Other Findings            Anesthetic Plan    ASA: 3, emergent  Anesthesia type: total IV anesthesia          Induction: Intravenous  Anesthetic plan and risks discussed with: Patient

## 2021-11-26 NOTE — PROGRESS NOTES
TRANSFER - IN REPORT:    Verbal report received from [address] on Kandice Ortega  being received from  for ordered procedure      Report consisted of patients Situation, Background, Assessment and   Recommendations(SBAR). Information from the following report(s) SBAR, Intake/Output, MAR, Recent Results, Med Rec Status and Pre Procedure Checklist was reviewed with the receiving nurse. Opportunity for questions and clarification was provided.       Es RN arranging transport for arrival time to [facility] at 1200

## 2021-11-26 NOTE — ANESTHESIA POSTPROCEDURE EVALUATION
Procedure(s):  SIGMOIDOSCOPY FLEXIBLE/ BMI 19 [facility] 356 .     total IV anesthesia    Anesthesia Post Evaluation        Patient location during evaluation: PACU  Patient participation: complete - patient participated  Level of consciousness: awake  Pain management: adequate  Airway patency: patent  Anesthetic complications: no  Cardiovascular status: acceptable  Respiratory status: acceptable  Hydration status: acceptable  Post anesthesia nausea and vomiting:  none

## 2021-11-26 NOTE — PROCEDURES
FLEXIBLE SIGMOIDOSCOPY    DATE of PROCEDURE: 11/26/2021    INDICATION:  1. Rectal bleeding    POSTPROCEDURE DIAGNOSIS:  1. Diverticulosis of colon    MEDICATION:     1. MAC. Further details per anesthesia note. INSTRUMENT: [id number]    PROCEDURE: After obtaining informed consent, the patient was placed in the left lateral position and sedated. The endoscope was advanced to transverse colon. On withdrawal, the colon was visualized carefully and in a 360 degree fashion. The patient was taken to the recovery area in stable condition. Bowel prep quality was poor. FINDINGS:  Rectum: normal  Sigmoid: Moderate diverticulosis. Otherwise normal.  Descending Colon: Moderate diverticulosis. Formed stool present. Distal transverse colon: Moderate formed stool. Otherwise normal exam.    NO EVIDENCE OF ACTIVE OR RECENT BLEEDING. Estimated blood loss: 0-minimal   Specimens obtained during procedure: none    PLAN:  1. No evidence of GI bleed  2. Resume diet    Will be available as needed. Please call with questions.     Kiran Holden MD

## 2021-11-26 NOTE — PROGRESS NOTES
END OF SHIFT NOTE:    Intake/Output  11/26 0701 - 11/26 1900  In: 3532 [P.O.:360; I.V.:787]  Out: 0    Voiding: YES  Catheter: YES external catheter   Drain:   External Urinary Catheter 11/25/21 (Active)   Site Assessment Intact 11/26/21 1001   Repositioned Yes 11/26/21 1001   Perineal Care Yes 11/26/21 1001   Wick Changed Yes 11/26/21 1001   Suction Canister/Tubing Changed No 11/26/21 1001   Urine Output (mL) 300 ml 11/26/21 0510               Stool:  2 occurrences. Stool Assessment  Stool Color: Brown (11/26/21 1001)  Stool Appearance: Soft (11/26/21 1001)  Stool Amount: Small (11/26/21 1001)  Stool Source/Status: Rectum (11/26/21 1001)    Emesis:  0 occurrences. VITAL SIGNS  Patient Vitals for the past 12 hrs:   Temp Pulse Resp BP SpO2   11/26/21 1516 97.6 °F (36.4 °C) 71 16 (!) 105/54 95 %   11/26/21 1048 98 °F (36.7 °C) 65 16 (!) 115/58 100 %   11/26/21 0729 98.4 °F (36.9 °C) 70 16 114/62 95 %       Pain Assessment  Pain 1  Pain Scale 1: Numeric (0 - 10) (11/26/21 1010)  Pain Intensity 1: 0 (11/26/21 1010)  Patient Stated Pain Goal: 0 (11/26/21 0729)  Pain Reassessment 1: Yes (11/25/21 2143)  Pain Onset 1: pta (11/25/21 2043)  Pain Location 1: Back (11/25/21 2043)  Pain Orientation 1: Mid (11/25/21 2043)  Pain Description 1: Aching (11/25/21 2043)  Pain Intervention(s) 1: Medication (see MAR) (11/25/21 2043)    Ambulating  Yes    Additional Information: Patients sig flex completed. Tramadol administered x1 this shift. Shift report given to oncoming nurse at the bedside.     Lonny Cortes RN

## 2021-11-26 NOTE — PROGRESS NOTES
OT note: Attempted to see pt today for therapy. Pt downtown for a procedure will try again as time permits.    Zulema Hickey, OT

## 2021-11-26 NOTE — PROGRESS NOTES
Twp tap water enemas administered, two large soft stools expelled. Brown clear fluid followed. Patient tolerated well.

## 2021-11-26 NOTE — PROGRESS NOTES
EOS    - flex sig today, NPO since MN  - pt resting quietly throughout the night, vss, no needs voiced  -no bloody stool noted throughout night  - report given to oncoming nurse

## 2021-11-26 NOTE — INTERVAL H&P NOTE
Update History & Physical    The Patient's History and Physical of November 25, 2021 was reviewed with the patient and I examined the patient. There was no change. The surgical site was confirmed by the patient and me. Plan:  The risk, benefits, expected outcome, and alternative to the recommended procedure have been discussed with the patient. Patient understands and wants to proceed with the procedure.     Electronically signed by Marisol Hawley MD on 11/26/2021 at 12:47 PM

## 2021-11-27 LAB
ANION GAP SERPL CALC-SCNC: 5 MMOL/L (ref 7–16)
BUN SERPL-MCNC: 20 MG/DL (ref 8–23)
CALCIUM SERPL-MCNC: 9.2 MG/DL (ref 8.3–10.4)
CHLORIDE SERPL-SCNC: 100 MMOL/L (ref 98–107)
CO2 SERPL-SCNC: 25 MMOL/L (ref 21–32)
CREAT SERPL-MCNC: 0.57 MG/DL (ref 0.6–1)
ERYTHROCYTE [DISTWIDTH] IN BLOOD BY AUTOMATED COUNT: 14.6 % (ref 11.9–14.6)
GLUCOSE SERPL-MCNC: 98 MG/DL (ref 65–100)
HCT VFR BLD AUTO: 21 % (ref 35.8–46.3)
HCT VFR BLD AUTO: 27.8 % (ref 35.8–46.3)
HEMOCCULT STL QL: NEGATIVE
HGB BLD-MCNC: 6.9 G/DL (ref 11.7–15.4)
HGB BLD-MCNC: 9.1 G/DL (ref 11.7–15.4)
HISTORY CHECKED?,CKHIST: NORMAL
MCH RBC QN AUTO: 33.2 PG (ref 26.1–32.9)
MCHC RBC AUTO-ENTMCNC: 32.9 G/DL (ref 31.4–35)
MCV RBC AUTO: 101 FL (ref 79.6–97.8)
NRBC # BLD: 0 K/UL (ref 0–0.2)
PLATELET # BLD AUTO: 296 K/UL (ref 150–450)
PMV BLD AUTO: 9.6 FL (ref 9.4–12.3)
POTASSIUM SERPL-SCNC: 4.5 MMOL/L (ref 3.5–5.1)
RBC # BLD AUTO: 2.08 M/UL (ref 4.05–5.2)
SODIUM SERPL-SCNC: 130 MMOL/L (ref 136–145)
WBC # BLD AUTO: 4.7 K/UL (ref 4.3–11.1)

## 2021-11-27 PROCEDURE — P9016 RBC LEUKOCYTES REDUCED: HCPCS

## 2021-11-27 PROCEDURE — 86900 BLOOD TYPING SEROLOGIC ABO: CPT

## 2021-11-27 PROCEDURE — 80048 BASIC METABOLIC PNL TOTAL CA: CPT

## 2021-11-27 PROCEDURE — 65270000029 HC RM PRIVATE

## 2021-11-27 PROCEDURE — 74011250637 HC RX REV CODE- 250/637: Performed by: HOSPITALIST

## 2021-11-27 PROCEDURE — 82272 OCCULT BLD FECES 1-3 TESTS: CPT

## 2021-11-27 PROCEDURE — 36430 TRANSFUSION BLD/BLD COMPNT: CPT

## 2021-11-27 PROCEDURE — 97535 SELF CARE MNGMENT TRAINING: CPT

## 2021-11-27 PROCEDURE — 97530 THERAPEUTIC ACTIVITIES: CPT

## 2021-11-27 PROCEDURE — 30233N1 TRANSFUSION OF NONAUTOLOGOUS RED BLOOD CELLS INTO PERIPHERAL VEIN, PERCUTANEOUS APPROACH: ICD-10-PCS | Performed by: HOSPITALIST

## 2021-11-27 PROCEDURE — 85018 HEMOGLOBIN: CPT

## 2021-11-27 PROCEDURE — 36415 COLL VENOUS BLD VENIPUNCTURE: CPT

## 2021-11-27 PROCEDURE — 86923 COMPATIBILITY TEST ELECTRIC: CPT

## 2021-11-27 PROCEDURE — 85027 COMPLETE CBC AUTOMATED: CPT

## 2021-11-27 RX ORDER — SODIUM CHLORIDE 9 MG/ML
250 INJECTION, SOLUTION INTRAVENOUS AS NEEDED
Status: DISCONTINUED | OUTPATIENT
Start: 2021-11-27 | End: 2021-11-28 | Stop reason: HOSPADM

## 2021-11-27 RX ADMIN — ESCITALOPRAM OXALATE 20 MG: 10 TABLET ORAL at 08:24

## 2021-11-27 RX ADMIN — PSYLLIUM HUSK 1 PACKET: 3.4 POWDER ORAL at 08:24

## 2021-11-27 RX ADMIN — LORAZEPAM 0.25 MG: 0.5 TABLET ORAL at 08:24

## 2021-11-27 RX ADMIN — Medication 1 G: at 08:23

## 2021-11-27 RX ADMIN — DICLOFENAC SODIUM 2 G: 10 GEL TOPICAL at 08:29

## 2021-11-27 RX ADMIN — Medication 1 G: at 17:22

## 2021-11-27 RX ADMIN — LORAZEPAM 0.25 MG: 0.5 TABLET ORAL at 17:22

## 2021-11-27 RX ADMIN — DICLOFENAC SODIUM 2 G: 10 GEL TOPICAL at 17:22

## 2021-11-27 RX ADMIN — DICLOFENAC SODIUM 2 G: 10 GEL TOPICAL at 14:31

## 2021-11-27 RX ADMIN — Medication 10 ML: at 14:31

## 2021-11-27 RX ADMIN — TRAMADOL HYDROCHLORIDE 50 MG: 50 TABLET, COATED ORAL at 17:22

## 2021-11-27 RX ADMIN — AMLODIPINE BESYLATE 2.5 MG: 5 TABLET ORAL at 21:54

## 2021-11-27 RX ADMIN — LEVOTHYROXINE SODIUM 75 MCG: 0.07 TABLET ORAL at 06:21

## 2021-11-27 RX ADMIN — DICLOFENAC SODIUM 2 G: 10 GEL TOPICAL at 23:13

## 2021-11-27 RX ADMIN — Medication 10 ML: at 05:18

## 2021-11-27 RX ADMIN — Medication 10 ML: at 23:15

## 2021-11-27 RX ADMIN — PSYLLIUM HUSK 1 PACKET: 3.4 POWDER ORAL at 17:22

## 2021-11-27 RX ADMIN — TRAMADOL HYDROCHLORIDE 50 MG: 50 TABLET, COATED ORAL at 23:17

## 2021-11-27 NOTE — PROGRESS NOTES
Patient Vitals for the past 12 hrs:   Temp Pulse Resp BP SpO2   11/27/21 1523 97.4 °F (36.3 °C) 74 18 (!) 113/56 98 %   11/27/21 1355 97.8 °F (36.6 °C) 80 24 124/67 99 %   11/27/21 1255 98.4 °F (36.9 °C) 71 21 (!) 108/55 96 %   11/27/21 1230 98.1 °F (36.7 °C) 73 22 (!) 102/56 100 %   11/27/21 1105 97.3 °F (36.3 °C) 81 24 (!) 104/55 100 %   11/27/21 0703 98 °F (36.7 °C) 70 24 (!) 109/59 97 %     q12h hgb/hct  1 unit prbcs transfused per orders. Pt tolerated well. Consent obtained prior to blood transfusion. Fluids dc'd per orders. Occult stool. Result negative. Pt up to chair for most of day.     Bedside shift report given to Jonny Morris, on-coming RN

## 2021-11-27 NOTE — PROGRESS NOTES
Problem: Pressure Injury - Risk of  Goal: *Prevention of pressure injury  Description: Document Ralph Scale and appropriate interventions in the flowsheet. Outcome: Progressing Towards Goal  Note: Pressure Injury Interventions:  Sensory Interventions: Assess changes in LOC, Assess need for specialty bed, Minimize linen layers, Pressure redistribution bed/mattress (bed type)    Moisture Interventions: Apply protective barrier, creams and emollients, Absorbent underpads, Assess need for specialty bed, Check for incontinence Q2 hours and as needed, Internal/External urinary devices, Maintain skin hydration (lotion/cream), Minimize layers, Moisture barrier    Activity Interventions: Increase time out of bed, Pressure redistribution bed/mattress(bed type), Assess need for specialty bed    Mobility Interventions: HOB 30 degrees or less, Pressure redistribution bed/mattress (bed type), Assess need for specialty bed    Nutrition Interventions: Document food/fluid/supplement intake, Offer support with meals,snacks and hydration    Friction and Shear Interventions: HOB 30 degrees or less                Problem: Falls - Risk of  Goal: *Absence of Falls  Description: Document Fernando Fall Risk and appropriate interventions in the flowsheet.   Outcome: Progressing Towards Goal  Note: Fall Risk Interventions:  Mobility Interventions: Bed/chair exit alarm, Communicate number of staff needed for ambulation/transfer, Patient to call before getting OOB    Mentation Interventions: Adequate sleep, hydration, pain control    Medication Interventions: Teach patient to arise slowly, Patient to call before getting OOB, Evaluate medications/consider consulting pharmacy, Bed/chair exit alarm    Elimination Interventions: Call light in reach, Toileting schedule/hourly rounds, Toilet paper/wipes in reach, Patient to call for help with toileting needs    History of Falls Interventions: Door open when patient unattended, Bed/chair exit alarm

## 2021-11-27 NOTE — PROGRESS NOTES
Problem: Self Care Deficits Care Plan (Adult)  Goal: *Acute Goals and Plan of Care (Insert Text)  Outcome: Progressing Towards Goal  Note:   1. Patient will complete grooming with contact guard assist to increase self care independence. 2. Patient will improve static sitting and dynamic sitting at edge of bed for 15 minutes to improve independence with transfers and self cares. 3. Patient will complete toilet transfer with assist of 1 person using adaptive equipment as needed. 4. Patient will complete chair transfer with minimal assist using adaptive equipment as needed. Timeframe: 7 visits       OCCUPATIONAL THERAPY: Daily Note and AM 11/27/2021  INPATIENT: OT Visit Days: 4  [place] / [place] / Product Type: Medicare /      NAME/AGE/GENDER: Mindi Painting is a 80 y.o. female   PRIMARY DIAGNOSIS:  Lower GI bleed [K92.2]  Acute blood loss anemia [D62] Acute blood loss anemia Acute blood loss anemia       ICD-10: Treatment Diagnosis:    · Generalized Muscle Weakness (M62.81)  · Other lack of cordination (R27.8)  · Difficulty in walking, Not elsewhere classified (R26.2)   Precautions/Allergies:   Patient has no known allergies. ASSESSMENT:     Ms. Tami Gonzales presents with the above diagnosis. At baseline, she lives with her grandson and his family. She requires assistance for all ADLs and transfers to [facility] for mobility. Today, she is agreeable to OT and PT evaluation. She sat up on the EOB with assistance and performed sit<>stands. She required assistance with donning shoes, combing hair, and setup to apply lip balm. She has damage to R shoulder and a forward flexed posture. Her hands are very arthritic. Pt reports she feels \"ready to go\" and has been praying to the [place]. Therapists comforted patient. She is functioning below her baseline and will benefit from continued OT during hospital stay. OT recommends home with continued care from caregivers at discharge. 11/22/21: Today, pt supine upon arrival. She performed sup>sit with min A x2. She requires occasional support in sitting. She performed leg exercises. Pt performed self-feeding with setup A and grooming in sitting with setup-min A. She agreed to perform sit<>stand attempts with mod A x2 or max A. During stand, pt reports that she had BM. Total assist for hygiene and brief change in supine. She was left with all needs met and in reach. Continue per OT POC.    11/23/2021  Pt seen in room with PTA present for assistance. Pt asking to was her face, pt set up for bathing with bath pack. Pt was able to was her face but needed assistance for the rest of her body. Pt up to [facility] with maximum assistance. Pt incontinent on the was to the [facility]. Pt with BM and blood noted. Pt was total assistance for es care and donning brief in bed. RN was notified of blood, blood seemed to be coming from vagina not rectum. Pt left supine in bed with all needs. 11/27/21 Pt was supine in bed upon arrival. Pt completed bed mobility with mod A. Pt completed sponge bath and dressing with the assistance listed below. Pt completed functional transfer to chair with max A. Continue POC. This section established at most recent assessment   PROBLEM LIST (Impairments causing functional limitations):  1. Decreased Strength  2. Decreased ADL/Functional Activities  3. Decreased Transfer Abilities  4. Decreased Activity Tolerance   INTERVENTIONS PLANNED: (Benefits and precautions of occupational therapy have been discussed with the patient.)  1. Activities of daily living training  2. Adaptive equipment training  3. Balance training  4. Therapeutic activity  5. Therapeutic exercise     TREATMENT PLAN: Frequency/Duration: Follow patient 3x/week to address above goals. Rehabilitation Potential For Stated Goals: Good     REHAB RECOMMENDATIONS (at time of discharge pending progress):    Placement:   It is my opinion, based on this patient's performance to date, that Ms. Alisia Tobin may benefit from [address] after discharge due to the functional deficits listed above that are likely to improve with skilled rehabilitation because he/she has multiple medical issues that affect his/her functional mobility in the community. Equipment:    None at this time              OCCUPATIONAL PROFILE AND HISTORY:   History of Present Injury/Illness (Reason for Referral):  See H&P  Past Medical History/Comorbidities:   Ms. Alisia Tobin  has a past medical history of Anxiety, CAD (coronary artery disease), Chronic hyponatremia, Hemorrhoids, and OA (osteoarthritis). Ms. Alisia Tobin  has no past surgical history on file. Social History/Living Environment:   Home Environment: Private residence  One/Two Story Residence: One story  Living Alone: No  Support Systems: Other Family Member(s)  Patient Expects to be Discharged to[de-identified] House  Current DME Used/Available at Home: Wheelchair  Prior Level of Function/Work/Activity:  Lives with grandson, assistance with all ADLs and mobility     Number of Personal Factors/Comorbidities that affect the Plan of Care: Brief history (0):  LOW COMPLEXITY   ASSESSMENT OF OCCUPATIONAL PERFORMANCE[de-identified]   Activities of Daily Living:   Basic ADLs (From Assessment) Complex ADLs (From Assessment)   Feeding: Setup  Oral Facial Hygiene/Grooming: Setup  Bathing: Maximum assistance  Upper Body Dressing: Moderate assistance  Lower Body Dressing: Total assistance  Toileting:  Total assistance     Grooming/Bathing/Dressing Activities of Daily Living   Grooming  Washing Face: Set-up Cognitive Retraining  Safety/Judgement: Fall prevention   Upper Body Bathing  Bathing Assistance: Minimum assistance  Position Performed: Seated edge of bed     Lower Body Bathing  Bathing Assistance: Maximum assistance  Lower Body : Maximum assistance  Position Performed: Seated edge of bed     Upper Body Dressing Assistance  Dressing Assistance: [address]: Minimum  assistance     Lower Body Dressing Assistance  Socks: Total assistance (dependent) Bed/Mat Mobility  Supine to Sit: Moderate assistance  Sit to Stand: Moderate assistance  Bed to Chair: Maximum assistance     Most Recent Physical Functioning:   Gross Assessment:                  Posture:  Posture (WDL): Exceptions to WDL  Posture Assessment: Forward head, Kyphosis, Rounded shoulders  Balance:  Sitting: Impaired  Sitting - Static: Fair (occasional)  Sitting - Dynamic: Fair (occasional)  Standing: Pull to stand; With support Bed Mobility:  Supine to Sit: Moderate assistance  Wheelchair Mobility:     Transfers:  Sit to Stand: Moderate assistance  Bed to Chair: Maximum assistance            Patient Vitals for the past 6 hrs:   BP BP Patient Position SpO2 Pulse   21 0703 (!) 109/59 At rest 97 % 70       Mental Status  Neurologic State: Alert  Orientation Level: Oriented X4  Cognition: Appropriate decision making, Appropriate for age attention/concentration  Perception: Appears intact  Perseveration: No perseveration noted  Safety/Judgement: Fall prevention                          Physical Skills Involved:  1. Range of Motion  2. Balance  3. Strength  4. Activity Tolerance Cognitive Skills Affected (resulting in the inability to perform in a timely and safe manner):  1. [facility] Psychosocial Skills Affected:  1. N/a   Number of elements that affect the Plan of Care: 1-3:  LOW COMPLEXITY   CLINICAL DECISION MAKIN \A Chronology of [place] [facility]\"" [address] [address] AM-PAC 6 Clicks   Daily Activity Inpatient Short Form  How much help from another person does the patient currently need. .. Total A Lot A Little None   1. Putting on and taking off regular lower body clothing? [x] 1   [] 2   [] 3   [] 4   2. Bathing (including washing, rinsing, drying)? [x] 1   [] 2   [] 3   [] 4   3. Toileting, which includes using toilet, bedpan or urinal?   [x] 1   [] 2   [] 3   [] 4   4. Putting on and taking off regular upper body clothing?    [] 1 [x] 2   [] 3   [] 4   5. Taking care of personal grooming such as brushing teeth? [] 1   [] 2   [x] 3   [] 4   6. Eating meals? [] 1   [] 2   [x] 3   [] 4   © 2007, Trustees of [facility] [employer], under license to [employer]. All rights reserved      Score:  Initial: 11 Most Recent: X (Date: -- )    Interpretation of Tool:  Represents activities that are increasingly more difficult (i.e. Bed mobility, Transfers, Gait). Medical Necessity:     · Skilled intervention continues to be required due to the above deficits. Reason for Services/Other Comments:  · See Above deficits. Use of outcome tool(s) and clinical judgement create a POC that gives a: LOW COMPLEXITY         TREATMENT:   (In addition to Assessment/Re-Assessment sessions the following treatments were rendered)     Pre-treatment Symptoms/Complaints:    Pain: Initial:   Pain Intensity 1: 0  Post Session:  0     Self Care: (20): Procedure(s) (per grid) utilized to improve and/or restore self-care/home management as related to dressing, bathing and grooming. Required min-total  verbal and manual cueing to facilitate activities of daily living skills. Therapeutic Activity: (    10): Therapeutic activities including functional transfer and bed mobility  to improve strength, balance and coordination. Required mod   to promote motor control of bilateral, upper extremity(s), lower extremity(s). Braces/Orthotics/Lines/Etc:   · purewick  · O2 Device: None (Room air)  Treatment/Session Assessment:    · Response to Treatment:  Good, supine in bed  · Interdisciplinary Collaboration:   o Certified Occupational Therapy Assistant  o Registered Nurse  · After treatment position/precautions:   o Up in chair  o Bed/Chair-wheels locked  o Bed in low position  o Call light within reach  o RN notified   · Compliance with Program/Exercises: Will assess as treatment progresses. · Recommendations/Intent for next treatment session:   \"Next visit will focus on advancements to more challenging activities and reduction in assistance provided\".   Total Treatment Duration:  OT Patient Time In/Time Out  Time In: 0820  Time Out: [id number] [address]

## 2021-11-27 NOTE — PROGRESS NOTES
Hospitalist Progress Note   Admit Date:  2021  2:04 PM   Name:  Fuentes Joaquin   Age:  80 y.o. Sex:  female  :  1926   MRN:  [id number]   Room:  [facility]/    Presenting Complaint: Constipation    Reason(s) for Admission: Lower GI bleed [K92.2]  Acute blood loss anemia [D62]     Hospital Course & Interval History: This is a very nice 79 y/o WF with a h/o CAD, HTN, chronic hyponatremia who was admitted to our service on  with acute blood loss anemia and hematochezia. Hb 1 week prior to admission was in the 10s; 3 months ago was high 11. On presentation it was 9.7. She describes nothing but blood during each BM. She was admitted and started on IVFs, PPI, serial H/H GI consult. Suppository for severe constipation. No endoscopy per patient and family. Winston Guzman was consulted with concerns for vaginal bleeding but no sourcewas identified, patient s/p hysterectomy so likely all rectal. Hb remains stable. Working with PT/OT well but still weak. She has had ongoing rectal bleeding since admission. Subjective (21):  Hemoglobin this morning is 6.9. Ordered 1 unit of packed red blood cells. No fever no chills. [place] sigmoid diverticulosis. No evidence of GI bleed. Assessment & Plan:     Principal Problem: This is a 51-year-old female with:    Acute blood loss anemia secondary to acute lower GI bleed  Patient with bleeding per rectum. Initially patient was seen by GYN on  for suspected vaginal bleeding. No lesions noted and patient is status post hysterectomy. GI on board. Appreciate recommendations.  Hemoglobin this morning is 6.9. Ordered 1 unit of packed red blood cells. No fever no chills. [place] sigmoid diverticulosis. No evidence of GI bleed. Urinary tract infection  Urine cultures positive for Morganella. Patient completed 3-day course of cefepime.      Hyponatremia  Sodium of 130 this morning. Looks chronic. Baseline seems to be 132-134. Hypertension  Continue home medications amlodipine. Hypothyroidism  Levothyroxine    MDD/anxiety  Lexapro, low-dose Ativan. Dispo/Discharge Planning:    Discharge plans pending. Hopefully discharge patient back home with home health in ? 24 hours if patient's hemoglobin is stable and no evidence of bleeding. Diet:  ADULT ORAL NUTRITION SUPPLEMENT Breakfast, Lunch, Dinner; Standard High Calorie/High Protein  ADULT DIET Dysphagia - Soft & Bite Sized;  Low Fat/Low Chol/High Fiber/LILLI; No Red Dye; Nothing red  DVT PPx: SCDs  Code status: Full Code    Hospital Problems as of 11/27/2021 Date Reviewed: 11/18/2021          Codes Class Noted - Resolved POA    Hyponatremia ICD-10-CM: E87.1  ICD-9-CM: 276.1  11/25/2021 - Present Unknown        * (Principal) Acute blood loss anemia ICD-10-CM: D62  ICD-9-CM: 285.1  11/18/2021 - Present Yes        MDD (major depressive disorder) ICD-10-CM: F32.9  ICD-9-CM: 296.20  11/18/2021 - Present Yes        Anxiety ICD-10-CM: F41.9  ICD-9-CM: 300.00  11/18/2021 - Present Yes        Hypertension ICD-10-CM: I10  ICD-9-CM: 401.9  11/18/2021 - Present Yes        Acquired hypothyroidism ICD-10-CM: E03.9  ICD-9-CM: 244.9  11/18/2021 - Present Yes        Lower GI bleed ICD-10-CM: K92.2  ICD-9-CM: 578.9  11/17/2021 - Present Yes              Objective:     Patient Vitals for the past 24 hrs:   Temp Pulse Resp BP SpO2   11/27/21 1355 97.8 °F (36.6 °C) 80 24 124/67 99 %   11/27/21 1255 98.4 °F (36.9 °C) 71 21 (!) 108/55 96 %   11/27/21 1230 98.1 °F (36.7 °C) 73 22 (!) 102/56 100 %   11/27/21 1105 97.3 °F (36.3 °C) 81 24 (!) 104/55 100 %   11/27/21 0703 98 °F (36.7 °C) 70 24 (!) 109/59 97 %   11/27/21 0342 97.7 °F (36.5 °C) 69 20 (!) 98/54 97 %   11/26/21 2346 97.6 °F (36.4 °C) 75 22 (!) 97/54 96 %   11/26/21 1943 97.8 °F (36.6 °C) 80 14 (!) 100/52 98 %   11/26/21 1516 97.6 °F (36.4 °C) 71 16 (!) 105/54 95 %     Oxygen Therapy  O2 Sat (%): 99 % (11/27/21 1355)  Pulse via Oximetry: 67 beats per minute (11/19/21 0745)  O2 Device: None (Room air) (11/27/21 0843)    Estimated body mass index is 19.37 kg/m² as calculated from the following:    Height as of this encounter: 5' 6\" (1.676 m). Weight as of this encounter: 54.4 kg (120 lb). Intake/Output Summary (Last 24 hours) at 11/27/2021 1414  Last data filed at 11/27/2021 1043  Gross per 24 hour   Intake 2328 ml   Output 750 ml   Net 1578 ml         Physical Exam:     Blood pressure 124/67, pulse 80, temperature 97.8 °F (36.6 °C), resp. rate 24, height 5' 6\" (1.676 m), weight 54.4 kg (120 lb), SpO2 99 %. General:    Elderly female, no overt distress  Head:  Normocephalic, atraumatic  Eyes:  Pallor, no jaundice, Pupils equally round. ENT:  Nares appear normal, no drainage. Moist oral mucosa  Neck:  No restricted ROM. Trachea midline   CV:   RRR. No m/r/g. No jugular venous distension. Lungs:   CTAB. No wheezing, rhonchi, or rales. Respirations even, unlabored  Abdomen: Bowel sounds present. Soft, nontender, nondistended. Extremities: No cyanosis or clubbing. No edema  Skin:     No rashes and normal coloration. Warm and dry. Neuro:  CN II-XII grossly intact. Sensation intact. A&Ox3  Psych:  Normal mood and affect.       I have reviewed ordered lab tests and independently visualized imaging below:    Recent Labs:  Recent Results (from the past 48 hour(s))   HGB & HCT    Collection Time: 11/25/21  3:06 PM   Result Value Ref Range    HGB 7.6 (L) 11.7 - 15.4 g/dL    HCT 23.0 (L) 35.8 - 46.3 %   CBC W/O DIFF    Collection Time: 11/26/21  3:00 AM   Result Value Ref Range    WBC 5.0 4.3 - 11.1 K/uL    RBC 2.14 (L) 4.05 - 5.2 M/uL    HGB 7.1 (L) 11.7 - 15.4 g/dL    HCT 21.5 (L) 35.8 - 46.3 %    .5 (H) 79.6 - 97.8 FL    MCH 33.2 (H) 26.1 - 32.9 PG    MCHC 33.0 31.4 - 35.0 g/dL    RDW 14.8 (H) 11.9 - 14.6 %    PLATELET 228 123 - 323 K/uL    MPV 9.1 (L) 9.4 - 12.3 FL    ABSOLUTE NRBC 0.00 0.0 - 0.2 K/uL   METABOLIC PANEL, BASIC    Collection Time: 11/26/21  3:00 AM   Result Value Ref Range    Sodium 131 (L) 136 - 145 mmol/L    Potassium 4.7 3.5 - 5.1 mmol/L    Chloride 101 98 - 107 mmol/L    CO2 26 21 - 32 mmol/L    Anion gap 4 (L) 7 - 16 mmol/L    Glucose 100 65 - 100 mg/dL    BUN 26 (H) 8 - 23 MG/DL    Creatinine 0.58 (L) 0.6 - 1.0 MG/DL    GFR est AA >60 >60 ml/min/1.73m2    GFR est non-AA >60 >60 ml/min/1.73m2    Calcium 9.6 8.3 - 10.4 MG/DL   HGB & HCT    Collection Time: 11/26/21  3:20 PM   Result Value Ref Range    HGB 7.7 (L) 11.7 - 15.4 g/dL    HCT 23.4 (L) 35.8 - 46.3 %   CBC W/O DIFF    Collection Time: 11/27/21  2:52 AM   Result Value Ref Range    WBC 4.7 4.3 - 11.1 K/uL    RBC 2.08 (L) 4.05 - 5.2 M/uL    HGB 6.9 (LL) 11.7 - 15.4 g/dL    HCT 21.0 (L) 35.8 - 46.3 %    .0 (H) 79.6 - 97.8 FL    MCH 33.2 (H) 26.1 - 32.9 PG    MCHC 32.9 31.4 - 35.0 g/dL    RDW 14.6 11.9 - 14.6 %    PLATELET 368 775 - 180 K/uL    MPV 9.6 9.4 - 12.3 FL    ABSOLUTE NRBC 0.00 0.0 - 0.2 K/uL   METABOLIC PANEL, BASIC    Collection Time: 11/27/21  2:52 AM   Result Value Ref Range    Sodium 130 (L) 136 - 145 mmol/L    Potassium 4.5 3.5 - 5.1 mmol/L    Chloride 100 98 - 107 mmol/L    CO2 25 21 - 32 mmol/L    Anion gap 5 (L) 7 - 16 mmol/L    Glucose 98 65 - 100 mg/dL    BUN 20 8 - 23 MG/DL    Creatinine 0.57 (L) 0.6 - 1.0 MG/DL    GFR est AA >60 >60 ml/min/1.73m2    GFR est non-AA >60 >60 ml/min/1.73m2    Calcium 9.2 8.3 - 10.4 MG/DL   RBC, ALLOCATE    Collection Time: 11/27/21  3:45 AM   Result Value Ref Range    HISTORY CHECKED?  Historical check performed    TYPE & SCREEN    Collection Time: 11/27/21  4:32 AM   Result Value Ref Range    Crossmatch Expiration 11/30/2021,2359     ABO/Rh(D) A POSITIVE     Antibody screen NEG     Unit number [id number]     Blood component type RC LR     Unit division 00     Status of unit ISSUED     Crossmatch result Compatible    OCCULT BLOOD, STOOL    Collection Time: 11/27/21 10:36 AM   Result Value Ref Range    Occult blood, stool Negative NEG         All Micro Results     Procedure Component Value Units Date/Time    CULTURE, URINE [[id number]]  (Abnormal)  (Susceptibility) Collected: 11/21/21 0423    Order Status: Completed Specimen: Urine from Clean catch Updated: 11/24/21 0649     Special Requests: NO SPECIAL REQUESTS        Culture result:       >100,000 COLONIES/mL MORGANELLA MORGANII                  >100,000 COLONIES/mL MIXED SKIN LONNIE ISOLATED          COVID-19 RAPID TEST [[id number]] Collected: 11/17/21 1930    Order Status: Completed Specimen: Nasopharyngeal Updated: 11/17/21 2011     Specimen source NASAL SWAB        COVID-19 rapid test Not detected        Comment:      The specimen is NEGATIVE for SARS-CoV-2, the novel coronavirus associated with COVID-19. A negative result does not rule out COVID-19. This test has been authorized by the FDA under an Emergency Use Authorization (EUA) for use by authorized laboratories. Fact sheet for Healthcare Providers: [employer]nz  Fact sheet for Patients: [employer]       Methodology: Isothermal Nucleic Acid Amplification               Other Studies:  No results found.     Current Meds:  Current Facility-Administered Medications   Medication Dose Route Frequency    0.9% sodium chloride infusion 250 mL  250 mL IntraVENous PRN    amLODIPine (NORVASC) tablet 2.5 mg  2.5 mg Oral Q12H    diclofenac (VOLTAREN) 1 % topical gel 2 g  2 g Topical QID    escitalopram oxalate (LEXAPRO) tablet 20 mg  20 mg Oral DAILY    hydrocortisone (ANUSOL-HC) suppository 25 mg  25 mg Rectal Q12H    levothyroxine (SYNTHROID) tablet 75 mcg  75 mcg Oral ACB    LORazepam (ATIVAN) tablet 0.25 mg  0.25 mg Oral BID    polyethylene glycol (MIRALAX) packet 17 g  17 g Oral BID    psyllium husk-aspartame (METAMUCIL FIBER) packet 1 Packet  1 Packet Oral BID  sodium chloride (NS) flush 5-40 mL  5-40 mL IntraVENous Q8H    sodium chloride tablet 1 g  1 g Oral BID WITH MEALS    acetaminophen (TYLENOL) tablet 650 mg  650 mg Oral Q6H PRN    Or    acetaminophen (TYLENOL) suppository 650 mg  650 mg Rectal Q6H PRN    bisacodyL (DULCOLAX) suppository 10 mg  10 mg Rectal DAILY PRN    bisacodyL (DULCOLAX) tablet 5 mg  5 mg Oral DAILY PRN    carboxymethylcellulose sodium (REFRESH LIQUIGEL) 1 % ophthalmic solution 1 Drop  1 Drop Both Eyes PRN    lip protectant (BLISTEX) ointment 1 Each  1 Each Topical PRN    ondansetron (ZOFRAN ODT) tablet 4 mg  4 mg Oral Q8H PRN    Or    ondansetron (ZOFRAN) injection 4 mg  4 mg IntraVENous Q6H PRN    promethazine (PHENERGAN) tablet 12.5 mg  12.5 mg Oral QHS PRN    sodium chloride (NS) flush 5-40 mL  5-40 mL IntraVENous PRN    sodium chloride (OCEAN) 0.65 % nasal squeeze bottle 2 Spray  2 Spray Both Nostrils Q2H PRN    traMADoL (ULTRAM) tablet 50 mg  50 mg Oral Q6H PRN       Signed:  Lucy Pak MD    Part of this note may have been written by using a voice dictation software. The note has been proof read but may still contain some grammatical/other typographical errors.

## 2021-11-28 LAB
ABO + RH BLD: NORMAL
BLD PROD TYP BPU: NORMAL
BLOOD GROUP ANTIBODIES SERPL: NORMAL
BPU ID: NORMAL
CROSSMATCH RESULT,%XM: NORMAL
ERYTHROCYTE [DISTWIDTH] IN BLOOD BY AUTOMATED COUNT: 15.3 % (ref 11.9–14.6)
HCT VFR BLD AUTO: 27.4 % (ref 35.8–46.3)
HGB BLD-MCNC: 9.4 G/DL (ref 11.7–15.4)
MCH RBC QN AUTO: 33.8 PG (ref 26.1–32.9)
MCHC RBC AUTO-ENTMCNC: 34.3 G/DL (ref 31.4–35)
MCV RBC AUTO: 98.6 FL (ref 79.6–97.8)
NRBC # BLD: 0 K/UL (ref 0–0.2)
PLATELET # BLD AUTO: 351 K/UL (ref 150–450)
PMV BLD AUTO: 9 FL (ref 9.4–12.3)
RBC # BLD AUTO: 2.78 M/UL (ref 4.05–5.2)
SPECIMEN EXP DATE BLD: NORMAL
STATUS OF UNIT,%ST: NORMAL
UNIT DIVISION, %UDIV: 0
WBC # BLD AUTO: 4.2 K/UL (ref 4.3–11.1)

## 2021-11-28 PROCEDURE — 36415 COLL VENOUS BLD VENIPUNCTURE: CPT

## 2021-11-28 PROCEDURE — 74011250637 HC RX REV CODE- 250/637: Performed by: HOSPITALIST

## 2021-11-28 PROCEDURE — 85027 COMPLETE CBC AUTOMATED: CPT

## 2021-11-28 RX ORDER — AMLODIPINE BESYLATE 2.5 MG/1
2.5 TABLET ORAL EVERY 12 HOURS
Qty: 60 TABLET | Refills: 0 | Status: SHIPPED
Start: 2021-11-28 | End: 2021-12-28

## 2021-11-28 RX ADMIN — AMLODIPINE BESYLATE 2.5 MG: 5 TABLET ORAL at 09:02

## 2021-11-28 RX ADMIN — DICLOFENAC SODIUM 2 G: 10 GEL TOPICAL at 13:32

## 2021-11-28 RX ADMIN — HYDROCORTISONE ACETATE 25 MG: 25 SUPPOSITORY RECTAL at 09:01

## 2021-11-28 RX ADMIN — Medication 10 ML: at 05:16

## 2021-11-28 RX ADMIN — ESCITALOPRAM OXALATE 20 MG: 10 TABLET ORAL at 09:02

## 2021-11-28 RX ADMIN — LEVOTHYROXINE SODIUM 75 MCG: 0.07 TABLET ORAL at 05:16

## 2021-11-28 RX ADMIN — POLYETHYLENE GLYCOL 3350 17 G: 17 POWDER, FOR SOLUTION ORAL at 09:02

## 2021-11-28 RX ADMIN — LORAZEPAM 0.25 MG: 0.5 TABLET ORAL at 09:02

## 2021-11-28 RX ADMIN — PSYLLIUM HUSK 1 PACKET: 3.4 POWDER ORAL at 09:02

## 2021-11-28 RX ADMIN — Medication 1 G: at 09:01

## 2021-11-28 RX ADMIN — Medication 10 ML: at 13:32

## 2021-11-28 RX ADMIN — DICLOFENAC SODIUM 2 G: 10 GEL TOPICAL at 09:03

## 2021-11-28 NOTE — PROGRESS NOTES
Problem: Pressure Injury - Risk of  Goal: *Prevention of pressure injury  Description: Document Ralph Scale and appropriate interventions in the flowsheet. 11/28/2021 1537 by Alexi Paul RN  Outcome: Resolved/Met  11/28/2021 1146 by Alexi Paul RN  Outcome: Progressing Towards Goal  Note: Pressure Injury Interventions:  Sensory Interventions: Assess changes in LOC, Assess need for specialty bed, Keep linens dry and wrinkle-free, Maintain/enhance activity level, Minimize linen layers, Pressure redistribution bed/mattress (bed type), Monitor skin under medical devices    Moisture Interventions: Apply protective barrier, creams and emollients, Internal/External urinary devices, Limit adult briefs, Minimize layers, Moisture barrier    Activity Interventions: Pressure redistribution bed/mattress(bed type), Increase time out of bed, Assess need for specialty bed    Mobility Interventions: HOB 30 degrees or less, Pressure redistribution bed/mattress (bed type), Assess need for specialty bed    Nutrition Interventions: Document food/fluid/supplement intake, Offer support with meals,snacks and hydration    Friction and Shear Interventions: Apply protective barrier, creams and emollients, Foam dressings/transparent film/skin sealants, HOB 30 degrees or less, Minimize layers                Problem: Patient Education: Go to Patient Education Activity  Goal: Patient/Family Education  Outcome: Resolved/Met     Problem: Falls - Risk of  Goal: *Absence of Falls  Description: Document Fernando Fall Risk and appropriate interventions in the flowsheet.   11/28/2021 1537 by Alexi Paul RN  Outcome: Resolved/Met  11/28/2021 1146 by Alexi Paul RN  Outcome: Progressing Towards Goal  Note: Fall Risk Interventions:  Mobility Interventions: Bed/chair exit alarm, Communicate number of staff needed for ambulation/transfer, Patient to call before getting OOB    Mentation Interventions: Bed/chair exit alarm, Door open when patient unattended, More frequent rounding, Reorient patient, Room close to nurse's station, Update white board    Medication Interventions: Bed/chair exit alarm, Patient to call before getting OOB, Teach patient to arise slowly    Elimination Interventions: Bed/chair exit alarm, Call light in reach, Patient to call for help with toileting needs, Toilet paper/wipes in reach, Toileting schedule/hourly rounds    History of Falls Interventions: Bed/chair exit alarm, Door open when patient unattended, Room close to nurse's station         Problem: Patient Education: Go to Patient Education Activity  Goal: Patient/Family Education  Outcome: Resolved/Met     Problem: Patient Education: Go to Patient Education Activity  Goal: Patient/Family Education  Outcome: Resolved/Met     Problem: Patient Education: Go to Patient Education Activity  Goal: Patient/Family Education  Outcome: Resolved/Met

## 2021-11-28 NOTE — PROGRESS NOTES
LEN met with the patient's grandson, provided LTC resources and advised that [place] resumption orders would be sent to [facility]. LEN provided the patient's grandson with contact information for [facility] to follow up on status of Medicaid application. Resumption orders faxed. Patient to discharge home with grandchristy and [place] resumed.      Care Management Interventions  PCP Verified by CM: No  Mode of Transport at Discharge: S  Transition of Care Consult (CM Consult): [address]: No  Reason Outside [place]: Patient already serviced by other home care/hospice agency  Physical Therapy Consult: Yes  Occupational Therapy Consult: Yes  Support Systems: Other Family Member(s)  Confirm Follow Up Transport: Family  The Patient and/or Patient Representative was Provided with a Choice of Provider and Agrees with the Discharge Plan?: Yes  Freedom of Choice List was Provided with Basic Dialogue that Supports the Patient's Individualized Plan of Care/Goals, Treatment Preferences and Shares the Quality Data Associated with the Providers?: Yes  Discharge Location  Discharge Placement: Home with home health    [facility] [place], [address] Work   [address] [facility]    * [email or web address]

## 2021-11-28 NOTE — PROGRESS NOTES
Problem: Pressure Injury - Risk of  Goal: *Prevention of pressure injury  Description: Document Ralph Scale and appropriate interventions in the flowsheet. Outcome: Progressing Towards Goal  Note: Pressure Injury Interventions:  Sensory Interventions: Assess changes in LOC, Assess need for specialty bed, Keep linens dry and wrinkle-free, Maintain/enhance activity level, Minimize linen layers, Pressure redistribution bed/mattress (bed type), Monitor skin under medical devices    Moisture Interventions: Apply protective barrier, creams and emollients, Internal/External urinary devices, Limit adult briefs, Minimize layers, Moisture barrier    Activity Interventions: Pressure redistribution bed/mattress(bed type), Increase time out of bed, Assess need for specialty bed    Mobility Interventions: HOB 30 degrees or less, Pressure redistribution bed/mattress (bed type), Assess need for specialty bed    Nutrition Interventions: Document food/fluid/supplement intake, Offer support with meals,snacks and hydration    Friction and Shear Interventions: Apply protective barrier, creams and emollients, Foam dressings/transparent film/skin sealants, HOB 30 degrees or less, Minimize layers                Problem: Falls - Risk of  Goal: *Absence of Falls  Description: Document Fernando Fall Risk and appropriate interventions in the flowsheet.   Outcome: Progressing Towards Goal  Note: Fall Risk Interventions:  Mobility Interventions: Bed/chair exit alarm, Communicate number of staff needed for ambulation/transfer, Patient to call before getting OOB    Mentation Interventions: Bed/chair exit alarm, Door open when patient unattended, More frequent rounding, Reorient patient, Room close to nurse's station, Update white board    Medication Interventions: Bed/chair exit alarm, Patient to call before getting OOB, Teach patient to arise slowly    Elimination Interventions: Bed/chair exit alarm, Call light in reach, Patient to call for help with toileting needs, Toilet paper/wipes in reach, Toileting schedule/hourly rounds    History of Falls Interventions: Bed/chair exit alarm, Door open when patient unattended, Room close to nurse's station

## 2021-11-28 NOTE — PROGRESS NOTES
Dc education complete with pt and pt's grandson including home care, medications, and follow-up appointments (see AVS). Pt interested in SNF. Social work notified and to bedside to speak with pt's grandson. IV dc'd. Pt dcd home via wheelchair with family.

## 2021-11-28 NOTE — DISCHARGE SUMMARY
Hospitalist Discharge Summary   Admit Date:  2021  2:04 PM   DC Note date: 2021  Name:  Anthony Looney   Age:  80 y.o. Sex:  female  :  1926   MRN:  [id number]   [facility]  PCP:  Kelle Snyder MD    Presenting Complaint: Constipation    Initial Admission Diagnosis: Lower GI bleed [K92.2]  Acute blood loss anemia [D62]     Problem List for this Hospitalization:  Hospital Problems as of 2021 Date Reviewed: 2021          Codes Class Noted - Resolved POA    Hyponatremia ICD-10-CM: E87.1  ICD-9-CM: 276.1  2021 - Present Unknown        * (Principal) Acute blood loss anemia ICD-10-CM: D62  ICD-9-CM: 285.1  2021 - Present Yes        MDD (major depressive disorder) ICD-10-CM: F32.9  ICD-9-CM: 296.20  2021 - Present Yes        Anxiety ICD-10-CM: F41.9  ICD-9-CM: 300.00  2021 - Present Yes        Hypertension ICD-10-CM: I10  ICD-9-CM: 401.9  2021 - Present Yes        Acquired hypothyroidism ICD-10-CM: E03.9  ICD-9-CM: 244.9  2021 - Present Yes        Lower GI bleed ICD-10-CM: K92.2  ICD-9-CM: 578.9  2021 - Present Yes            Did Patient have Sepsis (YES OR NO): NO    Acute blood loss anemia secondary to lower GI bleed  Urinary tract infection  Hyponatremia chronic POA  Hypertension  Hypothyroidism  MDD/anxiety      Hospital Course: This is a very nice 81 y/o WF with a h/o CAD, HTN, chronic hyponatremia who was admitted to our service on  with acute blood loss anemia and hematochezia. Hb 1 week prior to admission was in the 10s; 3 months ago was high 11. On presentation it was 9.7. She describes nothing but blood during each BM. She was admitted and started on IVFs, PPI, serial H/H GI consult. Suppository for severe constipation. No endoscopy per patient and family.  GYN was consulted with concerns for vaginal bleeding but no sourcewas identified, patient s/p hysterectomy so likely all rectal. Hb remains stable.  Working with PT/OT well but still weak. She has had ongoing rectal bleeding since admission. During the course of hospitalization, patient initially had bleeding per rectum. Initially GYN was consulted on 11/20 for suspected vaginal bleeding. No lesions noticed and patient is status post hysterectomy. GI was consulted. She underwent flexible sigmoidoscopy on 11/26 at [facility] which revealed sigmoid diverticulosis. No evidence of active GI bleed. Hemoglobin was 6.9 on 11/27 for which 1 unit of packed red blood cells was ordered. Hemoglobin on discharge is 9.4. Patient was given Metamucil in addition to continuation of medications for constipation. She has Morganella UTI on admission for which she completed course of cefepime. She has chronic hyponatremia with baseline sodium of 130-132. Other chronic medical problems which are stable include hypertension, hypothyroidism, depression/anxiety. She is discharged home with home health services today in a stable condition to follow-up with primary care physician in 3 to 5 days.       Disposition: [facility]  Diet: ADULT ORAL NUTRITION SUPPLEMENT Breakfast, Lunch, Dinner; Standard High Calorie/High Protein  ADULT DIET Dysphagia - Soft & Bite Sized; Low Fat/Low Chol/High Fiber/LILLI; No Red Dye; Nothing red  Code Status: Full Code    Follow Up Orders: Follow-up Appointments   Procedures    FOLLOW UP VISIT Appointment in: 3 - 5 Days F/U WITH PCP IN 3-5 DAYS. F/U WITH PCP IN 3-5 DAYS.      Standing Status:   Standing     Number of Occurrences:   1     Order Specific Question:   Appointment in     Answer:   3 - 5 Days       Follow-up Information     Follow up With Specialties Details Why Contact Info    Other, MD Kelle   pt to call for follow up appt with pcp in 3 to 5 days Patient can only remember the practice name and not the physician            Follow up labs/diagnostics (ultimately defer to outpatient provider):  CBC, CMP in 1 week    Time spent in patient discharge and coordination 39 minutes. Plan was discussed with the pt. All questions answered. Patient was stable at time of discharge. Instructions given to call a physician or return if any concerns. Discharge Info:   Current Discharge Medication List      START taking these medications    Details   psyllium husk-aspartame (METAMUCIL FIBER) 3.4 gram pwpk packet Take 1 Packet by mouth two (2) times a day for 30 days. Qty: 60 Packet, Refills: 0  Start date: 11/28/2021, End date: 12/28/2021         CONTINUE these medications which have CHANGED    Details   amLODIPine (NORVASC) 2.5 mg tablet Take 1 Tablet by mouth every twelve (12) hours for 30 days. Qty: 60 Tablet, Refills: 0  Start date: 11/28/2021, End date: 12/28/2021         CONTINUE these medications which have NOT CHANGED    Details   levothyroxine (SYNTHROID) 50 mcg tablet 75 mcg. LORazepam (ATIVAN) 0.5 mg tablet lorazepam 0.5 mg tablet   TAKE 1/2 TABLET EVERY MOTNING AND 1/2 TABLET AT BEDTIME      omeprazole (PRILOSEC) 40 mg capsule omeprazole 40 mg capsule,delayed release   TAKE 1 CAPSULE EACH DAY FOR STOMACH ACID REDUCTION. senna-docusate (PERICOLACE) 8.6-50 mg per tablet Senna Plus 8.6 mg-50 mg tablet   TAKE 1-2 TABLETS EVERY DAY AS NEEDED FOR CONSTIPATION      sodium chloride 1 gram tablet sodium chloride 1 gram tablet   TAKE 1 TABLET TWICE A DAY FOR SODIUM LEVELS.      traMADoL (ULTRAM) 50 mg tablet tramadol 50 mg tablet   Take 1 tablet every 6 hours by oral route as needed for 30 days. hydrocortisone (Anusol-HC) 25 mg supp Insert 25 mg into rectum every six (6) hours. cholecalciferol, vitamin D3, (Vitamin D3) 50 mcg (2,000 unit) tab Take  by mouth.      escitalopram oxalate (Lexapro) 20 mg tablet Take 20 mg by mouth daily. polyethylene glycol (Miralax) 17 gram/dose powder Take 17 g by mouth daily.       zmlijmvfu-Xzyyybyt-Oxcpe-W.Pet (Preparation H Maximum Strength) 0.25-1 % rectal cream Apply  to affected area as needed for Hemorrhoids. diclofenac (Voltaren) 1 % gel Apply  to affected area four (4) times daily. promethazine (PHENERGAN) 12.5 mg tablet Take 12.5 mg by mouth nightly as needed for Nausea. acetaminophen (TylenoL) 325 mg cap Take  by mouth as needed. Procedures done this admission:  * No surgery found *    Consults this admission:  IP CONSULT TO GASTROENTEROLOGY  IP CONSULT TO OB GYN    Echocardiogram/EKG results:  No results found for this or any previous visit. EKG Results     None          Diagnostic Imaging/Tests:   No results found. All Micro Results     Procedure Component Value Units Date/Time    CULTURE, URINE [[id number]]  (Abnormal)  (Susceptibility) Collected: 11/21/21 0423    Order Status: Completed Specimen: Urine from Clean catch Updated: 11/24/21 0649     Special Requests: NO SPECIAL REQUESTS        Culture result:       >100,000 COLONIES/mL MORGANELLA MORGANII                  >100,000 COLONIES/mL MIXED SKIN LONNIE ISOLATED          COVID-19 RAPID TEST [[id number]] Collected: 11/17/21 1930    Order Status: Completed Specimen: Nasopharyngeal Updated: 11/17/21 2011     Specimen source NASAL SWAB        COVID-19 rapid test Not detected        Comment:      The specimen is NEGATIVE for SARS-CoV-2, the novel coronavirus associated with COVID-19. A negative result does not rule out COVID-19. This test has been authorized by the FDA under an Emergency Use Authorization (EUA) for use by authorized laboratories.         Fact sheet for Healthcare Providers: ConventionUpdate.co.nz  Fact sheet for Patients: ConventionUpdate.co.nz       Methodology: Isothermal Nucleic Acid Amplification               Labs: Results:       BMP, Mg, Phos Recent Labs     11/27/21  0252 11/26/21  0300   * 131*   K 4.5 4.7    101   CO2 25 26   AGAP 5* 4*   BUN 20 26*   CREA 0.57* 0.58*   CA 9.2 9.6   GLU 98 100      CBC Recent Labs 11/28/21  0517 11/27/21  1601 11/27/21  0252 11/26/21  1520 11/26/21  0300   WBC 4.2*  --  4.7  --  5.0   RBC 2.78*  --  2.08*  --  2.14*   HGB 9.4* 9.1* 6.9*   < > 7.1*   HCT 27.4* 27.8* 21.0*   < > 21.5*     --  296  --  324    < > = values in this interval not displayed. LFT No results for input(s): ALT, TBIL, AP, TP, ALB, GLOB, AGRAT in the last 72 hours.     No lab exists for component: SGOT, GPT   Cardiac Testing No results found for: BNPP, BNP, CPK, RCK1, RCK2, RCK3, RCK4, CKMB, CKNDX, CKND1, TROPT, TROIQ   Coagulation Tests No results found for: PTP, INR, APTT, INREXT   A1c No results found for: HBA1C, LMC9SNOM   Lipid Panel No results found for: CHOL, CHOLPOCT, CHOLX, CHLST, CHOLV, 706911, HDL, HDLP, LDL, LDLC, DLDLP, 467506, VLDLC, VLDL, TGLX, TRIGL, TRIGP, TGLPOCT, CHHD, CHHDX   Thyroid Panel No results found for: TSH, T4, FT4, TT3, T3U, TSHEXT     Most Recent UA Lab Results   Component Value Date/Time    Color YELLOW 11/21/2021 04:23 AM    Appearance CLOUDY 11/21/2021 04:23 AM    Specific gravity 1.008 11/21/2021 04:23 AM    pH (UA) 6.0 11/21/2021 04:23 AM    Protein Negative 11/21/2021 04:23 AM    Glucose Negative 11/21/2021 04:23 AM    Ketone Negative 11/21/2021 04:23 AM    Bilirubin Negative 11/21/2021 04:23 AM    Blood SMALL (A) 11/21/2021 04:23 AM    Urobilinogen 0.2 11/21/2021 04:23 AM    Nitrites Positive (A) 11/21/2021 04:23 AM    Leukocyte Esterase LARGE (A) 11/21/2021 04:23 AM    WBC  11/21/2021 04:23 AM    RBC 0-3 11/21/2021 04:23 AM    Epithelial cells 0-3 11/21/2021 04:23 AM    Bacteria 3+ (H) 11/21/2021 04:23 AM    Casts 0-3 11/21/2021 04:23 AM    Crystals, urine 0 05/05/2008 09:19 PM    Mucus 0 05/05/2008 09:19 PM          All Labs from Last 24 Hrs:  Recent Results (from the past 24 hour(s))   HGB & HCT    Collection Time: 11/27/21  4:01 PM   Result Value Ref Range    HGB 9.1 (L) 11.7 - 15.4 g/dL    HCT 27.8 (L) 35.8 - 46.3 %   CBC W/O DIFF    Collection Time: 11/28/21 5: 17 AM   Result Value Ref Range    WBC 4.2 (L) 4.3 - 11.1 K/uL    RBC 2.78 (L) 4.05 - 5.2 M/uL    HGB 9.4 (L) 11.7 - 15.4 g/dL    HCT 27.4 (L) 35.8 - 46.3 %    MCV 98.6 (H) 79.6 - 97.8 FL    MCH 33.8 (H) 26.1 - 32.9 PG    MCHC 34.3 31.4 - 35.0 g/dL    RDW 15.3 (H) 11.9 - 14.6 %    PLATELET 763 358 - 779 K/uL    MPV 9.0 (L) 9.4 - 12.3 FL    ABSOLUTE NRBC 0.00 0.0 - 0.2 K/uL       Current Med List in Hospital:   Current Facility-Administered Medications   Medication Dose Route Frequency    0.9% sodium chloride infusion 250 mL  250 mL IntraVENous PRN    amLODIPine (NORVASC) tablet 2.5 mg  2.5 mg Oral Q12H    diclofenac (VOLTAREN) 1 % topical gel 2 g  2 g Topical QID    escitalopram oxalate (LEXAPRO) tablet 20 mg  20 mg Oral DAILY    hydrocortisone (ANUSOL-HC) suppository 25 mg  25 mg Rectal Q12H    levothyroxine (SYNTHROID) tablet 75 mcg  75 mcg Oral ACB    LORazepam (ATIVAN) tablet 0.25 mg  0.25 mg Oral BID    polyethylene glycol (MIRALAX) packet 17 g  17 g Oral BID    psyllium husk-aspartame (METAMUCIL FIBER) packet 1 Packet  1 Packet Oral BID    sodium chloride (NS) flush 5-40 mL  5-40 mL IntraVENous Q8H    sodium chloride tablet 1 g  1 g Oral BID WITH MEALS    acetaminophen (TYLENOL) tablet 650 mg  650 mg Oral Q6H PRN    Or    acetaminophen (TYLENOL) suppository 650 mg  650 mg Rectal Q6H PRN    bisacodyL (DULCOLAX) suppository 10 mg  10 mg Rectal DAILY PRN    bisacodyL (DULCOLAX) tablet 5 mg  5 mg Oral DAILY PRN    carboxymethylcellulose sodium (REFRESH LIQUIGEL) 1 % ophthalmic solution 1 Drop  1 Drop Both Eyes PRN    lip protectant (BLISTEX) ointment 1 Each  1 Each Topical PRN    ondansetron (ZOFRAN ODT) tablet 4 mg  4 mg Oral Q8H PRN    Or    ondansetron (ZOFRAN) injection 4 mg  4 mg IntraVENous Q6H PRN    promethazine (PHENERGAN) tablet 12.5 mg  12.5 mg Oral QHS PRN    sodium chloride (NS) flush 5-40 mL  5-40 mL IntraVENous PRN    sodium chloride (OCEAN) 0.65 % nasal squeeze bottle 2 Spray 2 [place] Both Nostrils Q2H PRN    traMADoL (ULTRAM) tablet 50 mg  50 mg Oral Q6H PRN       No Known Allergies    There is no immunization history on file for this patient. Recent Vital Data:  Patient Vitals for the past 24 hrs:   Temp Pulse Resp BP SpO2   11/28/21 1050 97.7 °F (36.5 °C) 69 16 115/66 99 %   11/28/21 0731 97.7 °F (36.5 °C) 74 18 (!) 158/82 95 %   11/28/21 0307 98 °F (36.7 °C) 67 18 137/63 95 %   11/27/21 2243 98.2 °F (36.8 °C) 68 18 126/64 96 %   11/27/21 1941 98.4 °F (36.9 °C) 72 18 123/60 98 %   11/27/21 1523 97.4 °F (36.3 °C) 74 18 (!) 113/56 98 %   11/27/21 1355 97.8 °F (36.6 °C) 80 24 124/67 99 %   11/27/21 1255 98.4 °F (36.9 °C) 71 21 (!) 108/55 96 %   11/27/21 1230 98.1 °F (36.7 °C) 73 22 (!) 102/56 100 %     Oxygen Therapy  O2 Sat (%): 99 % (11/28/21 1050)  Pulse via Oximetry: 67 beats per minute (11/19/21 0745)  O2 Device: None (Room air) (11/27/21 0824)    Estimated body mass index is 19.37 kg/m² as calculated from the following:    Height as of this encounter: 5' 6\" (1.676 m). Weight as of this encounter: 54.4 kg (120 lb). Intake/Output Summary (Last 24 hours) at 11/28/2021 1109  Last data filed at 11/28/2021 0855  Gross per 24 hour   Intake 310 ml   Output 3125 ml   Net -2815 ml         Physical Exam:    General:    Elderly female, No overt distress  Head:  Normocephalic, atraumatic  Eyes:  Sclerae appear normal.  Pupils equally round. HENT:  Nares appear normal, no drainage. Moist mucous membranes  Neck:  No restricted ROM. Trachea midline  CV:   RRR. No m/r/g. No JVD  Lungs:   CTAB. No wheezing, rhonchi, or rales. Even, unlabored  Abdomen:   Soft, nontender, nondistended. Active bowel sounds, no organomegaly. Extremities: Warm and dry. No cyanosis or clubbing. No edema. Skin:     No rashes. Normal coloration  Neuro:  CN II-XII grossly intact. Psych:  Normal mood and affect.       Signed:  Alva Thomas MD    Part of this note may have been written by using a voice dictation software. The note has been proof read but may still contain some grammatical/other typographical errors.

## 2021-11-28 NOTE — PROGRESS NOTES
Patient discussed during rounds. MD anticipates discharge today. Plan is to return home with her grandson and home health resumed through [facility]. Will continue to follow.      Nikhil Chiang LMSW    [facility]    * [email or web address]

## 2021-11-28 NOTE — PROGRESS NOTES
RN attempted to call pt's son and grandson regarding possible dc today. Waiting on return phone call.

## 2021-11-28 NOTE — PROGRESS NOTES
END OF SHIFT NOTE:    Intake/Output  11/27 1901 - 11/28 0700  In: -   Out: 1750 [[id number]]   Voiding: YES  Catheter: YES  Drain:   External Urinary Catheter 11/25/21 (Active)   Site Assessment Clean, dry, & intact 11/28/21 0446   Repositioned No 11/28/21 0446   Perineal Care No 11/28/21 0446   Wick Changed No 11/28/21 0446   Suction Canister/Tubing Changed No 11/28/21 0446   Urine Output (mL) 800 ml 11/28/21 0307               Stool:  0 occurrences. Stool Assessment  Stool Color: Ely Stall (11/26/21 1001)  Stool Appearance: Other (comment) (bleeding per report) (11/27/21 0824)  Stool Amount: Small (11/26/21 1001)  Stool Source/Status: Rectum (11/26/21 1001)    Emesis:  0 occurrences. VITAL SIGNS  Patient Vitals for the past 12 hrs:   Temp Pulse Resp BP SpO2   11/28/21 0307 98 °F (36.7 °C) 67 18 137/63 95 %   11/27/21 2243 98.2 °F (36.8 °C) 68 18 126/64 96 %   11/27/21 1941 98.4 °F (36.9 °C) 72 18 123/60 98 %       Pain Assessment  Pain 1  Pain Scale 1: Numeric (0 - 10) (11/28/21 0446)  Pain Intensity 1: 0 (11/28/21 0446)  Patient Stated Pain Goal: 0 (11/28/21 0446)  Pain Reassessment 1: Patient resting w/respiratory rate greater than 10 (11/26/21 2220)  Pain Onset 1: chronic (11/27/21 1722)  Pain Location 1: Leg (11/27/21 1722)  Pain Orientation 1: Right; Left (11/27/21 1722)  Pain Description 1: Aching; Constant (11/27/21 1722)  Pain Intervention(s) 1: Medication (see MAR) (11/26/21 2121)    Ambulating  No    Additional Information: gave prn pain medication x1    Shift report given to oncoming nurse at the bedside.     Siddhartha Gonzalez RN

## 2021-11-30 VITALS
RESPIRATION RATE: 16 BRPM | SYSTOLIC BLOOD PRESSURE: 115 MMHG | DIASTOLIC BLOOD PRESSURE: 66 MMHG | OXYGEN SATURATION: 99 % | HEIGHT: 66 IN | TEMPERATURE: 97.7 F | HEART RATE: 69 BPM | BODY MASS INDEX: 19.29 KG/M2 | WEIGHT: 120 LBS

## 2021-12-08 NOTE — PROGRESS NOTES
Physician Progress Note      Edilson Bran  [facility] #:                  [id number]  :                       1926  ADMIT DATE:       2021 2:04 PM  Christiano Reeves DATE:        2021 3:23 PM  RESPONDING  PROVIDER #:        Liza Sheppard MD          QUERY TEXT:    Patient admitted with GI bleeding, noted to have no active bleeding found during scope, patient did have diverticulosis and constipation. If possible, please document in progress notes and discharge summary the cause of the GI bleeding: The medical record reflects the following:  Risk Factors: severe constipation, diverticulosis found during scope,  Clinical Indicators: rectal bleeding , no acute bleeding found during sigmoidoscopy. Treatment: w/u for cause of bleeding, tx of anemia, tx of constipation  Options provided:  -- GI bleeding due to diverticulosis  -- GI bleeding due to, # (please fill in cause). -- Other - I will add my own diagnosis  -- Disagree - Not applicable / Not valid  -- Disagree - Clinically unable to determine / Unknown  -- Refer to Clinical Documentation Reviewer    PROVIDER RESPONSE TEXT:    This patient has GI bleeding due to diverticulosis. Query created by:  Danica Brought on 2021 7:41 AM      Electronically signed by:  Liza Sheppard MD 2021 8:11 AM

## 2022-03-19 PROBLEM — F41.9 ANXIETY: Status: ACTIVE | Noted: 2021-11-18

## 2022-03-19 PROBLEM — E03.9 ACQUIRED HYPOTHYROIDISM: Status: ACTIVE | Noted: 2021-11-18

## 2022-03-19 PROBLEM — E87.1 HYPONATREMIA: Status: ACTIVE | Noted: 2021-11-25

## 2022-03-19 PROBLEM — K92.2 LOWER GI BLEED: Status: ACTIVE | Noted: 2021-11-17

## 2022-03-19 PROBLEM — I10 HYPERTENSION: Status: ACTIVE | Noted: 2021-11-18

## 2022-03-19 PROBLEM — F32.9 MDD (MAJOR DEPRESSIVE DISORDER): Status: ACTIVE | Noted: 2021-11-18

## 2022-03-20 PROBLEM — D62 ACUTE BLOOD LOSS ANEMIA: Status: ACTIVE | Noted: 2021-11-18

## 2022-08-30 PROBLEM — N89.8 PRURITUS OF VAGINA: Status: ACTIVE | Noted: 2020-03-30

## 2022-08-30 PROBLEM — Z86.16 HISTORY OF COVID-19: Status: ACTIVE | Noted: 2022-03-18

## 2022-08-30 PROBLEM — M79.604 CHRONIC PAIN OF BOTH LOWER EXTREMITIES: Status: ACTIVE | Noted: 2022-03-11

## 2022-08-30 PROBLEM — M06.9 RHEUMATOID ARTHRITIS (HCC): Status: ACTIVE | Noted: 2022-01-01

## 2022-08-30 PROBLEM — L89.609 PRESSURE ULCER OF HEEL: Status: ACTIVE | Noted: 2022-01-08

## 2022-08-30 PROBLEM — Z51.5 HOSPICE CARE PATIENT: Status: ACTIVE | Noted: 2022-01-01

## 2022-08-30 PROBLEM — F03.90 DEMENTIA WITHOUT BEHAVIORAL DISTURBANCE (HCC): Status: ACTIVE | Noted: 2022-03-11

## 2022-08-30 PROBLEM — G89.29 CHRONIC PAIN OF BOTH LOWER EXTREMITIES: Status: ACTIVE | Noted: 2022-03-11

## 2022-08-30 PROBLEM — F41.9 ANXIETY AND DEPRESSION: Status: ACTIVE | Noted: 2022-03-11

## 2022-08-30 PROBLEM — M81.0 OSTEOPOROSIS: Status: ACTIVE | Noted: 2019-10-16

## 2022-08-30 PROBLEM — N39.0 RECURRENT UTI: Status: ACTIVE | Noted: 2022-01-01

## 2022-08-30 PROBLEM — F32.A ANXIETY AND DEPRESSION: Status: ACTIVE | Noted: 2022-03-11

## 2022-08-30 PROBLEM — F51.01 PRIMARY INSOMNIA: Status: ACTIVE | Noted: 2022-03-11

## 2022-08-30 PROBLEM — K58.1 IRRITABLE BOWEL SYNDROME WITH CONSTIPATION: Status: ACTIVE | Noted: 2020-04-12

## 2022-08-30 PROBLEM — L89.609 PRESSURE ULCER OF HEEL: Status: RESOLVED | Noted: 2022-01-08 | Resolved: 2022-01-01

## 2022-08-30 PROBLEM — S72.141A FRACTURE, INTERTROCHANTERIC, RIGHT FEMUR (HCC): Status: ACTIVE | Noted: 2017-02-04

## 2022-08-30 PROBLEM — R62.7 FAILURE TO THRIVE IN ADULT: Status: ACTIVE | Noted: 2022-03-06

## 2022-08-30 PROBLEM — I87.2 PERIPHERAL VENOUS INSUFFICIENCY: Status: ACTIVE | Noted: 2019-10-20

## 2022-08-30 PROBLEM — I73.9 PERIPHERAL VASCULAR DISEASE (HCC): Status: ACTIVE | Noted: 2022-02-28

## 2022-08-30 PROBLEM — R53.81 DEBILITY: Status: ACTIVE | Noted: 2018-05-02
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
## 2022-08-30 PROBLEM — M19.90 OSTEOARTHROSIS: Status: ACTIVE | Noted: 2019-10-16

## 2022-08-30 PROBLEM — R53.1 ASTHENIA: Status: ACTIVE | Noted: 2018-05-02

## 2022-08-30 PROBLEM — S72.002D CLOSED FRACTURE OF NECK OF LEFT FEMUR WITH ROUTINE HEALING: Status: ACTIVE | Noted: 2019-01-13

## 2022-08-30 PROBLEM — R29.6 RECURRENT FALLS: Status: ACTIVE | Noted: 2020-04-21

## 2022-08-30 PROBLEM — R30.0 DYSURIA: Status: ACTIVE | Noted: 2022-03-06

## 2022-08-30 PROBLEM — D50.0 IRON DEFICIENCY ANEMIA DUE TO CHRONIC BLOOD LOSS: Status: ACTIVE | Noted: 2022-03-11

## 2022-08-30 PROBLEM — E43 SEVERE PROTEIN-CALORIE MALNUTRITION (HCC): Status: ACTIVE | Noted: 2022-01-01

## 2022-08-30 PROBLEM — I51.9 HEART DISEASE: Status: ACTIVE | Noted: 2019-10-16

## 2022-08-30 PROBLEM — M75.100 NONTRAUMATIC ROTATOR CUFF TEAR: Status: ACTIVE | Noted: 2019-10-16

## 2022-08-30 PROBLEM — N95.2 ATROPHIC VAGINITIS: Status: ACTIVE | Noted: 2020-08-30

## 2022-08-30 PROBLEM — M79.605 CHRONIC PAIN OF BOTH LOWER EXTREMITIES: Status: ACTIVE | Noted: 2022-03-11

## 2022-08-30 PROBLEM — N30.20 CHRONIC CYSTITIS: Status: ACTIVE | Noted: 2020-04-12

## 2022-08-30 PROBLEM — K21.9 GASTROESOPHAGEAL REFLUX DISEASE WITHOUT ESOPHAGITIS: Status: ACTIVE | Noted: 2020-03-30

## (undated) DEVICE — CONNECTOR TBNG OD5-7MM O2 END DISP

## (undated) DEVICE — NDL PRT INJ NSAF BLNT 18GX1.5 --

## (undated) DEVICE — CANNULA NSL ORAL AD FOR CAPNOFLEX CO2 O2 AIRLFE

## (undated) DEVICE — KENDALL RADIOLUCENT FOAM MONITORING ELECTRODE RECTANGULAR SHAPE: Brand: KENDALL

## (undated) DEVICE — SYR 5ML 1/5 GRAD LL NSAF LF --

## (undated) DEVICE — SYR 3ML LL TIP 1/10ML GRAD --